# Patient Record
Sex: MALE | Race: BLACK OR AFRICAN AMERICAN | NOT HISPANIC OR LATINO | Employment: FULL TIME | URBAN - METROPOLITAN AREA
[De-identification: names, ages, dates, MRNs, and addresses within clinical notes are randomized per-mention and may not be internally consistent; named-entity substitution may affect disease eponyms.]

---

## 2019-04-10 ENCOUNTER — OFFICE VISIT (OUTPATIENT)
Dept: FAMILY MEDICINE | Facility: CLINIC | Age: 59
End: 2019-04-10
Payer: COMMERCIAL

## 2019-04-10 VITALS
HEART RATE: 68 BPM | DIASTOLIC BLOOD PRESSURE: 78 MMHG | TEMPERATURE: 97.8 F | OXYGEN SATURATION: 99 % | WEIGHT: 176 LBS | SYSTOLIC BLOOD PRESSURE: 120 MMHG | BODY MASS INDEX: 29.29 KG/M2

## 2019-04-10 DIAGNOSIS — Z00.00 ENCOUNTER FOR ROUTINE ADULT HEALTH EXAMINATION WITHOUT ABNORMAL FINDINGS: ICD-10-CM

## 2019-04-10 DIAGNOSIS — Z12.5 SCREENING FOR PROSTATE CANCER: ICD-10-CM

## 2019-04-10 DIAGNOSIS — Z11.4 ENCOUNTER FOR SCREENING FOR HIV: Primary | ICD-10-CM

## 2019-04-10 LAB
ANION GAP SERPL CALCULATED.3IONS-SCNC: 9 MMOL/L (ref 3–14)
BUN SERPL-MCNC: 13 MG/DL (ref 7–30)
CALCIUM SERPL-MCNC: 9.4 MG/DL (ref 8.5–10.1)
CHLORIDE SERPL-SCNC: 107 MMOL/L (ref 94–109)
CHOLEST SERPL-MCNC: 177 MG/DL
CO2 SERPL-SCNC: 23 MMOL/L (ref 20–32)
CREAT SERPL-MCNC: 0.94 MG/DL (ref 0.66–1.25)
GFR SERPL CREATININE-BSD FRML MDRD: 87 ML/MIN/{1.73_M2}
GLUCOSE SERPL-MCNC: 101 MG/DL (ref 70–99)
HDLC SERPL-MCNC: 46 MG/DL
LDLC SERPL CALC-MCNC: 107 MG/DL
NONHDLC SERPL-MCNC: 131 MG/DL
POTASSIUM SERPL-SCNC: 4.3 MMOL/L (ref 3.4–5.3)
SODIUM SERPL-SCNC: 139 MMOL/L (ref 133–144)
TRIGL SERPL-MCNC: 119 MG/DL

## 2019-04-10 PROCEDURE — 80048 BASIC METABOLIC PNL TOTAL CA: CPT | Performed by: INTERNAL MEDICINE

## 2019-04-10 PROCEDURE — 99396 PREV VISIT EST AGE 40-64: CPT | Performed by: INTERNAL MEDICINE

## 2019-04-10 PROCEDURE — 87389 HIV-1 AG W/HIV-1&-2 AB AG IA: CPT | Performed by: INTERNAL MEDICINE

## 2019-04-10 PROCEDURE — 36415 COLL VENOUS BLD VENIPUNCTURE: CPT | Performed by: INTERNAL MEDICINE

## 2019-04-10 PROCEDURE — 80061 LIPID PANEL: CPT | Performed by: INTERNAL MEDICINE

## 2019-04-10 RX ORDER — ASPIRIN 325 MG
325 TABLET, DELAYED RELEASE (ENTERIC COATED) ORAL EVERY 4 HOURS PRN
Qty: 100 TABLET | Refills: 0 | COMMUNITY
Start: 2019-04-10 | End: 2020-12-09

## 2019-04-10 ASSESSMENT — ENCOUNTER SYMPTOMS
ABDOMINAL PAIN: 0
HEADACHES: 0
CONSTIPATION: 0
COUGH: 0
MYALGIAS: 0
DIARRHEA: 0
NAUSEA: 0
DIZZINESS: 0
PALPITATIONS: 0
EYE PAIN: 0
DYSURIA: 0
FREQUENCY: 0
JOINT SWELLING: 0
PARESTHESIAS: 0
ARTHRALGIAS: 0
HEMATOCHEZIA: 0
SORE THROAT: 0
CHILLS: 0
WEAKNESS: 0
HEARTBURN: 0
FEVER: 0
SHORTNESS OF BREATH: 0
NERVOUS/ANXIOUS: 0
HEMATURIA: 0

## 2019-04-10 NOTE — LETTER
[facility]   [address] [place]  [address]  [phone]                                   April 17, 2019    Ingrid Villavicencio  [address]  [facility] [address]        Dear Ingrid,    Labs look good.  No change in medications needed    Results for orders placed or performed in visit on 04/10/19   HIV Antigen Antibody Combo   Result Value Ref Range    HIV Antigen Antibody Combo Nonreactive NR^Nonreactive       Lipid panel reflex to direct LDL Fasting   Result Value Ref Range    Cholesterol 177 <200 mg/dL    Triglycerides 119 <150 mg/dL    HDL Cholesterol 46 >39 mg/dL    LDL Cholesterol Calculated 107 (H) <100 mg/dL    Non HDL Cholesterol 131 (H) <130 mg/dL   Basic metabolic panel   Result Value Ref Range    Sodium 139 133 - 144 mmol/L    Potassium 4.3 3.4 - 5.3 mmol/L    Chloride 107 94 - 109 mmol/L    Carbon Dioxide 23 20 - 32 mmol/L    Anion Gap 9 3 - 14 mmol/L    Glucose 101 (H) 70 - 99 mg/dL    Urea Nitrogen 13 7 - 30 mg/dL    Creatinine 0.94 0.66 - 1.25 mg/dL    GFR Estimate 87 >60 mL/min/[1.73_m2]    GFR Estimate If Black >90 >60 mL/min/[1.73_m2]    Calcium 9.4 8.5 - 10.1 mg/dL       If you have any questions please call the clinic at [phone]    Sincerely,    Amando Hurtado MD  [facility]

## 2019-04-10 NOTE — PROGRESS NOTES
SUBJECTIVE:   CC: Ingrid Villavicencio is an 58 year old male who presents for preventative health visit.     Healthy Habits:     Getting at least 3 servings of Calcium per day:  Yes    Bi-annual eye exam:  Yes    Dental care twice a year:  NO    Sleep apnea or symptoms of sleep apnea:  None    Diet:  Regular (no restrictions)    Frequency of exercise:  2-3 days/week    Duration of exercise:  15-30 minutes    Taking medications regularly:  Not Applicable    Medication side effects:  Not applicable    PHQ-2 Total Score: 0    Additional concerns today:  No    Blood pressure good  1 year old at home  8 and 1 years.  ruth ann          Today's PHQ-2 Score:   PHQ-2 ( 1999 [employer]) 4/10/2019   Q1: Little interest or pleasure in doing things 0   Q2: Feeling down, depressed or hopeless 0   PHQ-2 Score 0   Q1: Little interest or pleasure in doing things Not at all   Q2: Feeling down, depressed or hopeless Not at all   PHQ-2 Score 0       Abuse: Current or Past(Physical, Sexual or Emotional)- No  Do you feel safe in your environment? Yes    Social History     Tobacco Use     Smoking status: Never Smoker     Smokeless tobacco: Never Used   Substance Use Topics     Alcohol use: No     If you drink alcohol do you typically have >3 drinks per day or >7 drinks per week? No    Alcohol Use 4/10/2019   Prescreen: >3 drinks/day or >7 drinks/week? Not Applicable   Prescreen: >3 drinks/day or >7 drinks/week? -   No flowsheet data found.    Last PSA: No results found for: PSA    Reviewed orders with patient. Reviewed health maintenance and updated orders accordingly - Yes  Labs reviewed in EPIC  BP Readings from Last 3 Encounters:   04/10/19 120/78   08/02/16 122/77   08/03/15 122/76    Wt Readings from Last 3 Encounters:   04/10/19 79.8 kg (176 lb)   08/03/15 79.8 kg (176 lb)   11/05/14 79.4 kg (175 lb)                  Patient Active Problem List   Diagnosis     CARDIOVASCULAR SCREENING; LDL GOAL LESS THAN 160     Erectile dysfunction      GERD (gastroesophageal reflux disease)     Advanced directives, counseling/discussion     Bilateral low back pain with right-sided sciatica     History reviewed. No pertinent surgical history.    Social History     Tobacco Use     Smoking status: Never Smoker     Smokeless tobacco: Never Used   Substance Use Topics     Alcohol use: No     History reviewed. No pertinent family history.      Current Outpatient Medications   Medication Sig Dispense Refill     aspirin (ASA) 325 MG EC tablet Take 1 tablet (325 mg) by mouth every 4 hours as needed for moderate pain 100 tablet 0     Allergies   Allergen Reactions     Amoxicillin      Upset stomach/diarrhea       Reviewed and updated as needed this visit by clinical staff  Tobacco  Allergies  Meds  Med Hx  Surg Hx  Fam Hx  Soc Hx        Reviewed and updated as needed this visit by Provider            Review of Systems   Constitutional: Negative for chills and fever.   HENT: Negative for congestion, ear pain, hearing loss and sore throat.    Eyes: Negative for pain and visual disturbance.   Respiratory: Negative for cough and shortness of breath.    Cardiovascular: Negative for chest pain, palpitations and peripheral edema.   Gastrointestinal: Negative for abdominal pain, constipation, diarrhea, heartburn, hematochezia and nausea.   Genitourinary: Positive for urgency. Negative for discharge, dysuria, frequency, genital sores, hematuria and impotence.   Musculoskeletal: Negative for arthralgias, joint swelling and myalgias.   Skin: Negative for rash.   Neurological: Negative for dizziness, weakness, headaches and paresthesias.   Psychiatric/Behavioral: Negative for mood changes. The patient is not nervous/anxious.      CONSTITUTIONAL: NEGATIVE for fever, chills, change in weight  INTEGUMENTARY/SKIN: NEGATIVE for worrisome rashes, moles or lesions  EYES: NEGATIVE for vision changes or irritation  ENT: NEGATIVE for ear, mouth and throat problems  RESP: NEGATIVE for  significant cough or SOB  CV: NEGATIVE for chest pain, palpitations or peripheral edema  GI: NEGATIVE for nausea, abdominal pain, heartburn, or change in bowel habits   male: negative for dysuria, hematuria, decreased urinary stream, erectile dysfunction, urethral discharge  MUSCULOSKELETAL: NEGATIVE for significant arthralgias or myalgia  NEURO: NEGATIVE for weakness, dizziness or paresthesias  PSYCHIATRIC: NEGATIVE for changes in mood or affect    OBJECTIVE:   /78 (BP Location: Right arm, Patient Position: Chair, Cuff Size: Adult Regular)   Pulse 68   Temp 97.8  F (36.6  C) (Oral)   Wt 79.8 kg (176 lb)   SpO2 99%   BMI 29.29 kg/m      Physical Exam  GENERAL: healthy, alert and no distress  NECK: no adenopathy, no asymmetry, masses, or scars and thyroid normal to palpation  RESP: lungs clear to auscultation - no rales, rhonchi or wheezes  CV: regular rate and rhythm, normal S1 S2, no S3 or S4, no murmur, click or rub, no peripheral edema and peripheral pulses strong  ABDOMEN: soft, nontender, no hepatosplenomegaly, no masses and bowel sounds normal  MS: no gross musculoskeletal defects noted, no edema    Diagnostic Test Results:  Results for orders placed or performed in visit on 04/10/19   HIV Antigen Antibody Combo   Result Value Ref Range    HIV Antigen Antibody Combo Nonreactive NR^Nonreactive       Lipid panel reflex to direct LDL Fasting   Result Value Ref Range    Cholesterol 177 <200 mg/dL    Triglycerides 119 <150 mg/dL    HDL Cholesterol 46 >39 mg/dL    LDL Cholesterol Calculated 107 (H) <100 mg/dL    Non HDL Cholesterol 131 (H) <130 mg/dL   Basic metabolic panel   Result Value Ref Range    Sodium 139 133 - 144 mmol/L    Potassium 4.3 3.4 - 5.3 mmol/L    Chloride 107 94 - 109 mmol/L    Carbon Dioxide 23 20 - 32 mmol/L    Anion Gap 9 3 - 14 mmol/L    Glucose 101 (H) 70 - 99 mg/dL    Urea Nitrogen 13 7 - 30 mg/dL    Creatinine 0.94 0.66 - 1.25 mg/dL    GFR Estimate 87 >60 mL/min/[1.73_m2]     "GFR Estimate If Black >90 >60 mL/min/[1.73_m2]    Calcium 9.4 8.5 - 10.1 mg/dL       ASSESSMENT/PLAN:       ICD-10-CM    1. Encounter for screening for HIV Z11.4 HIV Antigen Antibody Combo   2. Encounter for routine adult health examination without abnormal findings Z00.00 VACCINE ADMINISTRATION, INITIAL     Lipid panel reflex to direct LDL Fasting     Basic metabolic panel   3. Screening for prostate cancer Z12.5 CANCELED: PSA, screen       COUNSELING:   Reviewed preventive health counseling, as reflected in patient instructions       Consider AAA screening for ages 65-75 and smoking history       Regular exercise       Healthy diet/nutrition       Vision screening       Hearing screening       Colon cancer screening       Prostate cancer screening    BP Readings from Last 1 Encounters:   04/10/19 120/78     Estimated body mass index is 29.29 kg/m  as calculated from the following:    Height as of 8/3/15: 1.651 m (5' 5\").    Weight as of this encounter: 79.8 kg (176 lb).      Weight management plan: Discussed healthy diet and exercise guidelines     reports that he has never smoked. He has never used smokeless tobacco.      ICD-10-CM    1. Encounter for screening for HIV Z11.4 HIV Antigen Antibody Combo   2. Encounter for routine adult health examination without abnormal findings Z00.00 VACCINE ADMINISTRATION, INITIAL     Lipid panel reflex to direct LDL Fasting     Basic metabolic panel   3. Screening for prostate cancer Z12.5 CANCELED: PSA, screen     Counseling Resources:  ATP IV Guidelines  Pooled Cohorts Equation Calculator  FRAX Risk Assessment  ICSI Preventive Guidelines  Dietary Guidelines for Americans, 2010  USDA's MyPlate  ASA Prophylaxis  Lung CA Screening    Amando Hurtado MD  [facility]  "

## 2019-04-11 LAB — HIV 1+2 AB+HIV1 P24 AG SERPL QL IA: NONREACTIVE

## 2019-05-24 ENCOUNTER — TELEPHONE (OUTPATIENT)
Dept: FAMILY MEDICINE | Facility: CLINIC | Age: 59
End: 2019-05-24

## 2019-05-24 NOTE — TELEPHONE ENCOUNTER
Notes recorded by Amando Hurtado MD on 4/17/2019 at 1:04 PM CDT  Labs look good.  No change in medications needed.    Notified patient of the above results.    Patient stated understanding and agreeable with the plan of care. TRE NegreteN,RN, [facility] Triage.

## 2019-05-24 NOTE — TELEPHONE ENCOUNTER
Reason for Call:  Request for results:    Name of test or procedure: Lab results    Date of test of procedure: 5/23/19 per PT but physical was on 4/10/19    Location of the test or procedure: [facility] per PT    OK to leave the result message on voice mail or with a family member? YES    Phone number Patient can be reached at:  Cell number on file:    Telephone Information:   Mobile [phone]       Additional comments: Did not see the appt and PT signing up for MY Chart to see as well.    Call taken on 5/24/2019 at 3:17 PM by Yaneth Yadav

## 2019-10-22 ENCOUNTER — TELEPHONE (OUTPATIENT)
Dept: FAMILY MEDICINE | Facility: CLINIC | Age: 59
End: 2019-10-22

## 2019-10-22 NOTE — TELEPHONE ENCOUNTER
Panel Management Review      Patient has the following on his problem list: None      Composite cancer screening  Chart review shows that this patient is due/due soon for the following Colonoscopy  Summary:    Patient is due/failing the following:   COLONOSCOPY    Action needed:   Patient needs referral/order: Colonoscopy    Type of outreach:    Sent letter.    Questions for provider review:    Order Colonoscopy                                                                                                                                    Ingrid Paulino MA       Chart routed to Care Team .

## 2019-10-22 NOTE — LETTER
October 22, 2019    Ingrid Villavicencio  [address] [facility] [address]    Dear Ingrid,    We care about your health and have reviewed your health plan. We have reviewed your medical conditions, medication list, and lab results and are making recommendations based on this review, to better manage your health.    You are in particular need of attention regarding:  - Scheduling a Colon Cancer Screening (Colonoscopy only) [phone] for [facility], call clinic for referral elsewhere    Here is a list of Health Maintenance topics that are due now or due soon:  Health Maintenance Due   Topic Date Due     DTAP/TDAP/TD IMMUNIZATION (1 - Tdap) 08/07/1983     ZOSTER IMMUNIZATION (1 of 2) 08/07/2008     COLONOSCOPY  03/07/2018     ADVANCE CARE PLANNING  05/14/2019     INFLUENZA VACCINE (1) 09/01/2019       Please call us at [phone] (or use [employer]) to address the above recommendations.     Thank you for trusting [facility] with your healthcare needs. We appreciate the opportunity to serve you and look forward to supporting you in the future.    Healthy Regards,    Your Care Team  JPJUAN/MARK  (Team 3)

## 2019-10-22 NOTE — LETTER
November 5, 2019    Ingrid Villavicencio  [address] [address]      Dear Ingrid,    We have tried to contact you about your health, but have been unable to reach you.  Please call us as soon as possible so we can provide you with the best care possible.  We will continue to check in with you throughout the year to complete these items of care, if you are not able to complete these items at this time.  If you would like to complete the missing items for your care, please contact us at [phone].    We recommend the following:  - Schedule a Colonoscopy (due every 10 years or every 5 years in higher risk situations). Call Kasey Mckeon at [phone] or call the clinic for referral elsewhere.  The other option is a FIT test (take home stool sample kit).  It does not replace the colonoscopy for colorectal cancer screening, but it can detect hidden bleeding in the lower colon.  It does need to be repeated every year and if a positive result is obtained, you would be referred for a colonoscopy. If you have completed either one of these tests at another facility, please call with the details of when and where the tests were done and if they were normal or not. Or have the records sent to our clinic so that we can best coordinate your care.      Sincerely,     Your Care Team   QING/EN

## 2019-11-18 ENCOUNTER — OFFICE VISIT (OUTPATIENT)
Dept: FAMILY MEDICINE | Facility: CLINIC | Age: 59
End: 2019-11-18
Payer: COMMERCIAL

## 2019-11-18 VITALS
DIASTOLIC BLOOD PRESSURE: 82 MMHG | TEMPERATURE: 97.9 F | WEIGHT: 175 LBS | OXYGEN SATURATION: 100 % | HEART RATE: 81 BPM | BODY MASS INDEX: 29.12 KG/M2 | SYSTOLIC BLOOD PRESSURE: 138 MMHG

## 2019-11-18 DIAGNOSIS — Z12.5 SPECIAL SCREENING FOR MALIGNANT NEOPLASM OF PROSTATE: ICD-10-CM

## 2019-11-18 DIAGNOSIS — Z12.11 SPECIAL SCREENING FOR MALIGNANT NEOPLASMS, COLON: Primary | ICD-10-CM

## 2019-11-18 PROCEDURE — 99214 OFFICE O/P EST MOD 30 MIN: CPT | Performed by: INTERNAL MEDICINE

## 2019-11-18 NOTE — PROGRESS NOTES
Subjective     Ingrid Villavicencio is a 59 year old male who presents to clinic today for the following health issues:    HPI   Left leg tingling then switches to rigth leg.  Pt is unclear about lab results from back in April so he would like clarification.        Patient Active Problem List   Diagnosis     CARDIOVASCULAR SCREENING; LDL GOAL LESS THAN 160     Erectile dysfunction     GERD (gastroesophageal reflux disease)     Advanced directives, counseling/discussion     Bilateral low back pain with right-sided sciatica     History reviewed. No pertinent surgical history.    Social History     Tobacco Use     Smoking status: Never Smoker     Smokeless tobacco: Never Used   Substance Use Topics     Alcohol use: No     History reviewed. No pertinent family history.      Current Outpatient Medications   Medication Sig Dispense Refill     UNABLE TO FIND MEDICATION NAME: OTC supplements       aspirin (ASA) 325 MG EC tablet Take 1 tablet (325 mg) by mouth every 4 hours as needed for moderate pain 100 tablet 0     Allergies   Allergen Reactions     Amoxicillin      Upset stomach/diarrhea     Recent Labs   Lab Test 04/10/19  1108 11/25/14  0838   * 99   HDL 46 42   TRIG 119 87   ALT  --  34   CR 0.94 0.90   GFRESTIMATED 87 87   GFRESTBLACK >90 >90  African American GFR Calc     POTASSIUM 4.3 4.2      BP Readings from Last 3 Encounters:   11/18/19 138/82   04/10/19 120/78   08/02/16 122/77    Wt Readings from Last 3 Encounters:   11/18/19 79.4 kg (175 lb)   04/10/19 79.8 kg (176 lb)   08/03/15 79.8 kg (176 lb)                      Reviewed and updated as needed this visit by Provider         Review of Systems   ROS COMP: Constitutional, HEENT, cardiovascular, pulmonary, gi and gu systems are negative, except as otherwise noted.      Objective    /82 (BP Location: Right arm, Patient Position: Chair, Cuff Size: Adult Regular)   Pulse 81   Temp 97.9  F (36.6  C) (Oral)   Wt 79.4 kg (175 lb)   SpO2 100%   BMI 29.12  kg/m    Body mass index is 29.12 kg/m .  Physical Exam   GENERAL: healthy, alert and no distress  NECK: no adenopathy, no asymmetry, masses, or scars and thyroid normal to palpation  RESP: lungs clear to auscultation - no rales, rhonchi or wheezes  CV: regular rate and rhythm, normal S1 S2, no S3 or S4, no murmur, click or rub, no peripheral edema and peripheral pulses strong  ABDOMEN: soft, nontender, no hepatosplenomegaly, no masses and bowel sounds normal  MS: no gross musculoskeletal defects noted, no edema    Diagnostic Test Results:  Labs reviewed in Epic  No results found for any visits on 11/18/19.        Assessment & Plan       ICD-10-CM    1. Special screening for malignant neoplasms, colon Z12.11 GASTROENTEROLOGY ADULT REF PROCEDURE ONLY [facility] [phone]   2. Special screening for malignant neoplasm of prostate Z12.5 **Prostate spec antigen screen FUTURE 1yr          ICD-10-CM    1. Special screening for malignant neoplasms, colon Z12.11 GASTROENTEROLOGY ADULT REF PROCEDURE ONLY [facility] [phone]   2. Special screening for malignant neoplasm of prostate Z12.5 **Prostate spec antigen screen FUTURE 1yr         Regular exercise    Return in about 5 months (around 4/18/2020) for follow up of condition, medication management.    Amando Hurtado MD  [facility]

## 2020-07-14 ENCOUNTER — TELEPHONE (OUTPATIENT)
Dept: FAMILY MEDICINE | Facility: CLINIC | Age: 60
End: 2020-07-14

## 2020-07-14 DIAGNOSIS — K21.00 GASTROESOPHAGEAL REFLUX DISEASE WITH ESOPHAGITIS: Primary | ICD-10-CM

## 2020-07-14 NOTE — TELEPHONE ENCOUNTER
Attempt # 1  Called  [phone]    Did patient answer the phone: No, left a message on voicemail to return call to triage line at [phone].    Patient has an appointment scheduled with Dr. Krueger for tomorrow at 0920.    Estefania RossRN,BSN  [facility]

## 2020-07-14 NOTE — TELEPHONE ENCOUNTER
Reason for call:  Patient reporting a symptom    Symptom or request: Heart burn    Duration (how long have symptoms been present): 3 nights    Have you been treated for this before? No    Additional comments: Patient called and stated he has been very stressed out because of family issues and for the past three days he has had a lot of stomach issues.  Patient is requesting to speak to his PCP Nurse to discuss symptoms of heart burn, bloating, burning.    Phone Number patient can be reached at:  Work number on file:  [phone] (work)    Best Time:  ASAP    Can we leave a detailed message on this number:  YES    Call taken on 7/14/2020 at 9:43 AM by Danni Peralta

## 2020-07-16 NOTE — TELEPHONE ENCOUNTER
Patient returned call to RN line and left message for call back to him, he actually left a rather lengthy voicemail, states he had phone visit scheduled with Dr. Krueger and did not find that very helpful (appears to me this was not done), wants to talk to or see Dr. Hurtado (his PCP), reports is having stomach burning, gas, burping, bloating.    I see there is another phone call already routed to provider regarding colo lyle test.    Flagged for call back, ?pharmacy? If Rx advised by Dr. Hurtado.    Cass Duke RN  [employer] [facility]

## 2020-07-16 NOTE — TELEPHONE ENCOUNTER
Attempt # 1  Called patient at home [phone]  Was call answered?  Yes, patient states somebody called him and she was not very nice.   Does not want to speak to Dr. Krueger.    Sunday night went to bed stomach pain upper stomach all night, felt bloated, stomach growling. Top side of stomach has hot flash. Intermittent pain is very stressed with family issues right now. Describes the pain at bottom of the breast bone, stomach gurgles.   Regular BM.    Nurse explained stress builds stomach acids, advised to avoid caffeine and spicy foods, drink plenty of water, take Tums to neutralize the stomach acid. Patient requested to see PCP as soon as possible and nurse did schedule a physical for August 3 and office visit Monday 7/20          Do you have: Negative screening.     Fever >100.0 -NO  New cough-NO  Shortness of breath-NO  Chills-NO  New loss of taste or smell-NO  Generalized body aches-NO  New persistent headache-NO  New sore throat-NO  New rash-NO  Nausea, vomiting or diarrhea-NO     Within the past 3 weeks, have you been exposed to someone with a known positive COVID 19 test?-NO  Have you traveled anywhere?-NO          Routing to PCP ILIANA Smalls RN  [facility]

## 2020-07-17 RX ORDER — PANTOPRAZOLE SODIUM 20 MG/1
20 TABLET, DELAYED RELEASE ORAL DAILY
Qty: 31 TABLET | Refills: 1 | Status: SHIPPED | OUTPATIENT
Start: 2020-07-17 | End: 2020-12-09

## 2020-07-17 NOTE — TELEPHONE ENCOUNTER
Called and left message  Ok to start protonix for 1-2 weeks.  If not improved, will need to be seen for further evaluation

## 2020-07-17 NOTE — TELEPHONE ENCOUNTER
Patient would like a callback to discuss his appointment for Monday and if he should keep that appointment.     [phone]    Thank you,  Dayana Hernandes  Patient Rep.  [facility]

## 2020-07-17 NOTE — TELEPHONE ENCOUNTER
RN spoke with patient. Relayed providers message below.     Melissa Conway RN, BSN, PHN  [facility] [place]

## 2020-08-03 ENCOUNTER — OFFICE VISIT (OUTPATIENT)
Dept: FAMILY MEDICINE | Facility: CLINIC | Age: 60
End: 2020-08-03
Payer: COMMERCIAL

## 2020-08-03 VITALS
SYSTOLIC BLOOD PRESSURE: 124 MMHG | TEMPERATURE: 98.5 F | OXYGEN SATURATION: 100 % | BODY MASS INDEX: 28.96 KG/M2 | DIASTOLIC BLOOD PRESSURE: 78 MMHG | HEART RATE: 65 BPM | WEIGHT: 174 LBS

## 2020-08-03 DIAGNOSIS — Z12.5 SPECIAL SCREENING FOR MALIGNANT NEOPLASM OF PROSTATE: ICD-10-CM

## 2020-08-03 DIAGNOSIS — Z00.00 ROUTINE GENERAL MEDICAL EXAMINATION AT A HEALTH CARE FACILITY: Primary | ICD-10-CM

## 2020-08-03 PROCEDURE — 99396 PREV VISIT EST AGE 40-64: CPT | Performed by: INTERNAL MEDICINE

## 2020-08-03 NOTE — PROGRESS NOTES
SUBJECTIVE:   CC: Ingrid Villavicencio is an 59 year old male who presents for preventative health visit.     Healthy Habits:    Getting at least 3 servings of Calcium per day:  Yes    Bi-annual eye exam:  Yes    Dental care twice a year:  NO    Sleep apnea or symptoms of sleep apnea:  None    Diet:  Regular (no restrictions)    Frequency of exercise:  4-5 days/week    Duration of exercise:  30-45 minutes    Taking medications regularly:  Yes    Barriers to taking medications:  None    Medication side effects:  None    PHQ-2 Total Score:    Additional concerns today:  No      Had stomach upset and took the tums and the the protonix  Hiatal hernia     ]    Today's PHQ-2 Score:   PHQ-2 ( 1999 Pfizer) 4/10/2019   Q1: Little interest or pleasure in doing things 0   Q2: Feeling down, depressed or hopeless 0   PHQ-2 Score 0   Q1: Little interest or pleasure in doing things Not at all   Q2: Feeling down, depressed or hopeless Not at all   PHQ-2 Score 0     ruth ann Peña 2012   In the eye   [facility]    Abuse: Current or Past(Physical, Sexual or Emotional)- No  Do you feel safe in your environment? Yes    Have you ever done Advance Care Planning? (For example, a Health Directive, POLST, or a discussion with a medical provider or your loved ones about your wishes): No, advance care planning information given to patient to review.  Patient plans to discuss their wishes with loved ones or provider.      Social History     Tobacco Use     Smoking status: Never Smoker     Smokeless tobacco: Never Used   Substance Use Topics     Alcohol use: No     If you drink alcohol do you typically have >3 drinks per day or >7 drinks per week? No    Alcohol Use 4/10/2019   Prescreen: >3 drinks/day or >7 drinks/week? Not Applicable   Prescreen: >3 drinks/day or >7 drinks/week? -   No flowsheet data found.    Last PSA: No results found for: PSA    Reviewed orders with patient. Reviewed health maintenance and updated orders  accordingly - Yes  Lab work is in process  Labs reviewed in EPIC  BP Readings from Last 3 Encounters:   08/03/20 124/78   11/18/19 138/82   04/10/19 120/78    Wt Readings from Last 3 Encounters:   08/03/20 78.9 kg (174 lb)   11/18/19 79.4 kg (175 lb)   04/10/19 79.8 kg (176 lb)                  Patient Active Problem List   Diagnosis     CARDIOVASCULAR SCREENING; LDL GOAL LESS THAN 160     Erectile dysfunction     GERD (gastroesophageal reflux disease)     Advanced directives, counseling/discussion     Bilateral low back pain with right-sided sciatica     History reviewed. No pertinent surgical history.    Social History     Tobacco Use     Smoking status: Never Smoker     Smokeless tobacco: Never Used   Substance Use Topics     Alcohol use: No     History reviewed. No pertinent family history.      Current Outpatient Medications   Medication Sig Dispense Refill     aspirin (ASA) 325 MG EC tablet Take 1 tablet (325 mg) by mouth every 4 hours as needed for moderate pain 100 tablet 0     pantoprazole (PROTONIX) 20 MG EC tablet Take 1 tablet (20 mg) by mouth daily 31 tablet 1     UNABLE TO FIND MEDICATION NAME: OTC supplements       Allergies   Allergen Reactions     Amoxicillin      Upset stomach/diarrhea       Reviewed and updated as needed this visit by clinical staff  Tobacco  Allergies  Meds  Med Hx  Surg Hx  Fam Hx  Soc Hx        Reviewed and updated as needed this visit by Provider            Review of Systems  CONSTITUTIONAL: NEGATIVE for fever, chills, change in weight  INTEGUMENTARY/SKIN: NEGATIVE for worrisome rashes, moles or lesions  EYES: NEGATIVE for vision changes or irritation  ENT: NEGATIVE for ear, mouth and throat problems  RESP: NEGATIVE for significant cough or SOB  CV: NEGATIVE for chest pain, palpitations or peripheral edema  GI: NEGATIVE for nausea, abdominal pain, heartburn, or change in bowel habits   male: negative for dysuria, hematuria, decreased urinary stream, erectile  "dysfunction, urethral discharge  MUSCULOSKELETAL: NEGATIVE for significant arthralgias or myalgia  NEURO: NEGATIVE for weakness, dizziness or paresthesias  PSYCHIATRIC: NEGATIVE for changes in mood or affect    OBJECTIVE:   There were no vitals taken for this visit.    Physical Exam  GENERAL: healthy, alert and no distress  NECK: no adenopathy, no asymmetry, masses, or scars and thyroid normal to palpation  RESP: lungs clear to auscultation - no rales, rhonchi or wheezes  CV: regular rate and rhythm, normal S1 S2, no S3 or S4, no murmur, click or rub, no peripheral edema and peripheral pulses strong  ABDOMEN: soft, nontender, no hepatosplenomegaly, no masses and bowel sounds normal  MS: no gross musculoskeletal defects noted, no edema    Diagnostic Test Results:  Labs reviewed in Epic  No results found for any visits on 08/03/20.    ASSESSMENT/PLAN:       ICD-10-CM    1. Routine general medical examination at a health care facility  Z00.00 **Basic metabolic panel FUTURE anytime     Lipid panel reflex to direct LDL Fasting       COUNSELING:   Reviewed preventive health counseling, as reflected in patient instructions       Consider AAA screening for ages 65-75 and smoking history       Regular exercise       Healthy diet/nutrition       Vision screening       Hearing screening       Colon cancer screening    Estimated body mass index is 29.12 kg/m  as calculated from the following:    Height as of 8/3/15: 1.651 m (5' 5\").    Weight as of 11/18/19: 79.4 kg (175 lb).     Weight management plan: Patient referred to endocrine and/or weight management specialty     reports that he has never smoked. He has never used smokeless tobacco.      ICD-10-CM    1. Routine general medical examination at a health care facility  Z00.00 **Basic metabolic panel FUTURE anytime     Lipid panel reflex to direct LDL Fasting     **TSH with free T4 reflex FUTURE anytime   2. Special screening for malignant neoplasm of prostate  Z12.5 PSA, " screen       Counseling Resources:  ATP IV Guidelines  Pooled Cohorts Equation Calculator  FRAX Risk Assessment  ICSI Preventive Guidelines  Dietary Guidelines for Americans, 2010  USDA's MyPlate  ASA Prophylaxis  Lung CA Screening      cologuard ordering.      Amando Hurtado MD  [facility]

## 2020-08-14 ENCOUNTER — TELEPHONE (OUTPATIENT)
Dept: FAMILY MEDICINE | Facility: CLINIC | Age: 60
End: 2020-08-14

## 2020-08-14 ENCOUNTER — DOCUMENTATION ONLY (OUTPATIENT)
Dept: FAMILY MEDICINE | Facility: CLINIC | Age: 60
End: 2020-08-14

## 2020-08-14 DIAGNOSIS — Z13.6 CARDIOVASCULAR SCREENING; LDL GOAL LESS THAN 160: Primary | ICD-10-CM

## 2020-08-14 NOTE — TELEPHONE ENCOUNTER
Reason for Call: Request for an order or referral:    Order or referral being requested: Blood type     Date needed: before my next appointment    Has the patient been seen by the PCP for this problem? Not Applicable    Additional comments: Patient is wanting order for blood type before his lab appointment on 8/17/20.     Phone number Patient can be reached at:  Home number on file [phone] (home)    Best Time:  Anytime    Can we leave a detailed message on this number?  YES    Call taken on 8/14/2020 at 3:03 PM by Andreas Quintero

## 2020-08-14 NOTE — PROGRESS NOTES
I will call next week to determine what he wants it for as it is usually a test we do if there are blood reactions, need for transfusion or incaompatibilities    Just checking ( like to see about covid risk) is not really helpful      
Patient requested a Blood Typing lab. Please order labs as needed.     Nuria Thank you, lab.   
Psych/Behavioral

## 2020-08-17 DIAGNOSIS — Z00.00 ROUTINE GENERAL MEDICAL EXAMINATION AT A HEALTH CARE FACILITY: ICD-10-CM

## 2020-08-17 DIAGNOSIS — Z12.5 SPECIAL SCREENING FOR MALIGNANT NEOPLASM OF PROSTATE: ICD-10-CM

## 2020-08-17 DIAGNOSIS — R97.20 ELEVATED PROSTATE SPECIFIC ANTIGEN (PSA): Primary | ICD-10-CM

## 2020-08-17 LAB
ANION GAP SERPL CALCULATED.3IONS-SCNC: 10 MMOL/L (ref 3–14)
BUN SERPL-MCNC: 11 MG/DL (ref 7–30)
CALCIUM SERPL-MCNC: 9.5 MG/DL (ref 8.5–10.1)
CHLORIDE SERPL-SCNC: 109 MMOL/L (ref 94–109)
CHOLEST SERPL-MCNC: 172 MG/DL
CO2 SERPL-SCNC: 23 MMOL/L (ref 20–32)
CREAT SERPL-MCNC: 0.9 MG/DL (ref 0.66–1.25)
GFR SERPL CREATININE-BSD FRML MDRD: >90 ML/MIN/{1.73_M2}
GLUCOSE SERPL-MCNC: 92 MG/DL (ref 70–99)
HDLC SERPL-MCNC: 44 MG/DL
LDLC SERPL CALC-MCNC: 111 MG/DL
NONHDLC SERPL-MCNC: 128 MG/DL
POTASSIUM SERPL-SCNC: 3.9 MMOL/L (ref 3.4–5.3)
PSA SERPL-ACNC: 22.8 UG/L (ref 0–4)
SODIUM SERPL-SCNC: 139 MMOL/L (ref 133–144)
TRIGL SERPL-MCNC: 84 MG/DL
TSH SERPL DL<=0.005 MIU/L-ACNC: 2.26 MU/L (ref 0.4–4)

## 2020-08-17 PROCEDURE — 80048 BASIC METABOLIC PNL TOTAL CA: CPT | Performed by: INTERNAL MEDICINE

## 2020-08-17 PROCEDURE — 36415 COLL VENOUS BLD VENIPUNCTURE: CPT | Performed by: INTERNAL MEDICINE

## 2020-08-17 PROCEDURE — 84443 ASSAY THYROID STIM HORMONE: CPT | Performed by: INTERNAL MEDICINE

## 2020-08-17 PROCEDURE — G0103 PSA SCREENING: HCPCS | Performed by: INTERNAL MEDICINE

## 2020-08-17 PROCEDURE — 80061 LIPID PANEL: CPT | Performed by: INTERNAL MEDICINE

## 2020-08-17 NOTE — LETTER
[facility]   [address] [place]  [address]  [phone]                                   August 24, 2020    Ingrid Villavicencio  [address] [address]        Dear Ingrid,    Your PSA level is elevated and should be evaluated by the urologist.   Please call to help him set this up     Results for orders placed or performed in visit on 08/17/20   PSA, screen     Status: Abnormal   Result Value Ref Range    PSA 22.80 (H) 0 - 4 ug/L   **TSH with free T4 reflex FUTURE anytime     Status: None   Result Value Ref Range    TSH 2.26 0.40 - 4.00 mU/L   Lipid panel reflex to direct LDL Fasting     Status: Abnormal   Result Value Ref Range    Cholesterol 172 <200 mg/dL    Triglycerides 84 <150 mg/dL    HDL Cholesterol 44 >39 mg/dL    LDL Cholesterol Calculated 111 (H) <100 mg/dL    Non HDL Cholesterol 128 <130 mg/dL   **Basic metabolic panel FUTURE anytime     Status: None   Result Value Ref Range    Sodium 139 133 - 144 mmol/L    Potassium 3.9 3.4 - 5.3 mmol/L    Chloride 109 94 - 109 mmol/L    Carbon Dioxide 23 20 - 32 mmol/L    Anion Gap 10 3 - 14 mmol/L    Glucose 92 70 - 99 mg/dL    Urea Nitrogen 11 7 - 30 mg/dL    Creatinine 0.90 0.66 - 1.25 mg/dL    GFR Estimate >90 >60 mL/min/[1.73_m2]    GFR Estimate If Black >90 >60 mL/min/[1.73_m2]    Calcium 9.5 8.5 - 10.1 mg/dL   If you have any questions please call the clinic at [phone]    Sincerely,    Amando Hurtado MD  bmd

## 2020-08-28 ENCOUNTER — TELEPHONE (OUTPATIENT)
Dept: FAMILY MEDICINE | Facility: CLINIC | Age: 60
End: 2020-08-28

## 2020-08-28 NOTE — TELEPHONE ENCOUNTER
Reason for Call:  Other     Detailed comments: Patient said that he was to be referred for a cologuard and also he was to have blood type the last time he had his blood work completed.    Phone Number Patient can be reached at: Cell number on file:    Telephone Information:   Mobile [phone]       Best Time:     Can we leave a detailed message on this number? YES    Call taken on 8/28/2020 at 9:49 AM by Lisa Kumar

## 2020-08-28 NOTE — TELEPHONE ENCOUNTER
Reason for Call:  Other     Detailed comments: Patient would like to be seen for a face to face for PSA.    Phone Number Patient can be reached at: Cell number on file:    Telephone Information:   Mobile [phone]       Best Time:     Can we leave a detailed message on this number? YES    Call taken on 8/28/2020 at 9:46 AM by Lisa Kumar

## 2020-08-28 NOTE — TELEPHONE ENCOUNTER
Called lab spoke to Tanner who states Jovani will call nurse back.    Nurse sees Colonoscopy referral entered 11/18/2019 [facility]  [phone]    Was drawn at [facility] - nurse called [facility] at [phone] no answer.      Attempt # 1  Called patient at home number.  Was call answered?  Yes, patient requesting a cologuard test?  Address is correct.      Routing to PCP to enter colorguard test.          Ana Smalls RN  Triage Nurse  [facility]  Appointment line: [phone]  [facility], 24 hour nurse line, available by calling clinic at [phone] and following prompts.

## 2020-09-17 ENCOUNTER — MEDICAL CORRESPONDENCE (OUTPATIENT)
Dept: HEALTH INFORMATION MANAGEMENT | Facility: CLINIC | Age: 60
End: 2020-09-17

## 2020-09-17 NOTE — TELEPHONE ENCOUNTER
Requested information faxed to number provided.  [facility]  Team 3 Coordinator

## 2020-09-25 ENCOUNTER — OFFICE VISIT (OUTPATIENT)
Dept: UROLOGY | Facility: CLINIC | Age: 60
End: 2020-09-25
Attending: INTERNAL MEDICINE
Payer: COMMERCIAL

## 2020-09-25 VITALS
DIASTOLIC BLOOD PRESSURE: 80 MMHG | HEART RATE: 73 BPM | SYSTOLIC BLOOD PRESSURE: 120 MMHG | WEIGHT: 174 LBS | BODY MASS INDEX: 27.31 KG/M2 | OXYGEN SATURATION: 98 % | HEIGHT: 67 IN

## 2020-09-25 DIAGNOSIS — R97.20 ELEVATED PROSTATE SPECIFIC ANTIGEN (PSA): Primary | ICD-10-CM

## 2020-09-25 DIAGNOSIS — R68.89 ABNORMAL DIGITAL RECTAL EXAM: ICD-10-CM

## 2020-09-25 DIAGNOSIS — N40.2 PROSTATE NODULE: ICD-10-CM

## 2020-09-25 LAB — PSA SERPL-MCNC: 30.5 NG/ML (ref 0–4)

## 2020-09-25 PROCEDURE — 84153 ASSAY OF PSA TOTAL: CPT | Performed by: UROLOGY

## 2020-09-25 PROCEDURE — 36415 COLL VENOUS BLD VENIPUNCTURE: CPT | Performed by: UROLOGY

## 2020-09-25 PROCEDURE — 99244 OFF/OP CNSLTJ NEW/EST MOD 40: CPT | Performed by: UROLOGY

## 2020-09-25 RX ORDER — CIPROFLOXACIN 500 MG/1
500 TABLET, FILM COATED ORAL 2 TIMES DAILY
Qty: 6 TABLET | Refills: 0 | Status: SHIPPED | OUTPATIENT
Start: 2020-09-25 | End: 2020-12-02

## 2020-09-25 ASSESSMENT — PAIN SCALES - GENERAL: PAINLEVEL: NO PAIN (0)

## 2020-09-25 ASSESSMENT — MIFFLIN-ST. JEOR: SCORE: 1539.95

## 2020-09-25 NOTE — PROGRESS NOTES
Urology Consult History and Physical  [facility]   Name: Ingrid Villavicencio    MRN: [id number]   YOB: 1960       We were asked to see Ingrid Villavicencio at the request of Dr. Hurtado for evaluation and treatment of Elevated PSA .        Chief Complaint:   Elevated PSA     History is obtained from the patient            History of Present Illness:   Ingrid Villavicencio is a 60 year old male who is being seen for evaluation of Elevated PSA.  He had recent PSA in August 2020 2.8.  No clear records that I could find of a prior PSA.  He denies any voiding symptoms.  He denies any family history of prostate cancer.  He denies any hip or bony tenderness.    Component PSA PSA Diag Urologic Phys   Latest Ref Rng & Units 0 - 4 ug/L 0.00 - 4.00 ng/mL   8/17/2020 22.80 (H)    9/25/2020  30.50 (H)       (4 or >)  Location: Blood  Quality: Elevated PSA  Severity: 22.8 and recheck of 30.5  Duration: August 2020           Past Medical History:     Past Medical History:   Diagnosis Date     Shingles     in the eye            Past Surgical History:   History reviewed. No pertinent surgical history.         Social History:     Social History     Tobacco Use     Smoking status: Never Smoker     Smokeless tobacco: Never Used   Substance Use Topics     Alcohol use: No       History   Smoking Status     Never Smoker   Smokeless Tobacco     Never Used            Family History:   History reviewed. No pertinent family history.           Allergies:     Allergies   Allergen Reactions     Amoxicillin      Upset stomach/diarrhea            Medications:     Current Outpatient Medications   Medication Sig     cholecalciferol 25 MCG (1000 UT) TABS Take 1 tablet by mouth daily     aspirin (ASA) 325 MG EC tablet Take 1 tablet (325 mg) by mouth every 4 hours as needed for moderate pain     pantoprazole (PROTONIX) 20 MG EC tablet Take 1 tablet (20 mg) by mouth daily (Patient not taking: Reported on 9/25/2020)     UNABLE TO FIND MEDICATION NAME: OTC  "supplements     No current facility-administered medications for this visit.              Review of Systems:     Skin: negative  Eyes: negative  Ears/Nose/Throat: negative  Respiratory: No shortness of breath, dyspnea on exertion, cough, or hemoptysis  Cardiovascular: negative  Gastrointestinal: negative  Genitourinary: as above  Musculoskeletal: negative  Neurologic: negative  Psychiatric: negative  Hematologic/Lymphatic/Immunologic: negative  Endocrine: negative          Physical Exam:     Patient Vitals for the past 24 hrs:   BP Pulse SpO2 Height Weight   09/25/20 1519 120/80 73 98 % 1.689 m (5' 6.5\") 78.9 kg (174 lb)     Body mass index is 27.66 kg/m .     General: age-appropriate appearing male in NAD  HEENT: Head AT/NC, EOMI, CN Grossly intact  Lungs: no respiratory distress, or pursed lip breathing  Heart: No obvious jugular venous distension present  Back: no bony midline tenderness, no CVAT bilaterally.  Abdomen: soft, non-distended, non-tender. No organomegaly  : normal male external genitalia.   Rectal: ~30cc gland, firm nodule on the LEFT apex, asymmetric  Lymph: no palpable inguinal lymphadenopathy.  LE: no edema.   Musculoskeltal: extremities normal, no peripheral edema  Skin: no suspicious lesions or rashes  Neuro: Alert, oriented, speech and mentation normal;  moving all 4 extremities equally.  Psych: affect and mood normal          Data:   All laboratory data reviewed:    UA RESULTS:  No results for input(s): COLOR, APPEARANCE, URINEGLC, URINEBILI, URINEKETONE, SG, UBLD, URINEPH, PROTEIN, UROBILINOGEN, NITRITE, LEUKEST, RBCU, WBCU in the last 83897 hours.   Component PSA PSA Diag Urologic Phys   Latest Ref Rng & Units 0 - 4 ug/L 0.00 - 4.00 ng/mL   8/17/2020 22.80 (H)    9/25/2020  30.50 (H)     Lab Results   Component Value Date    CR 0.90 08/17/2020               Impression and Plan:   Impression:   60-year-old man with significantly elevated PSA and abnormal SHANNAN      Plan:   Elevated PSA and " abnormal SHANNAN  -We discussed the use of PSA and SHANNAN a screening test for prostate cancer  -We discussed his initial PSA in August was very concerning at 22.8 and that our recheck today was further worrisome at 30.5  -We discussed the need for prompt further evaluation ideally with an MRI of the prostate followed by a prostate biopsy  -We discussed the use of prostate MRI and the biopsy naïve patient and that growing body of evidence helps to support this practice.  This is been shown to increase the diagnostic accuracy of the initial biopsy and decrease the rate of subsequent biopsies  -Order for the MRI placed  - TRUS/Bx - we discussed the risks and benefits of the prostate biopsy including the normal intermittent hematuria, blood per rectum, and blood with ejaculation as well as a 1-2% risk of post biopsy sepsis requiring hospitalization and IV antibiotics  -Prescription for ciprofloxacin sent to his pharmacy  -We discussed that based on his PSA and abnormal SHANNAN he has a very high likelihood of having a prostate cancer.  We discussed that pending the results of the prostate MRI and prostate biopsy further work-up with a nuclear medicine bone scan will likely be required prior to our ultimate treatment discussion     Thank you for the kind consultation.    Time spent: 40 minutes of which >50% was spent counseling.    Dov Tellez MD   Urology  [facility]  [facility] Phone: [phone]  [facility] Phone: [phone]

## 2020-09-25 NOTE — Clinical Note
Neptali Hurtado,    I saw Ingrid on Friday in consultation for his elevated PSA.  I rechecked his PSA in our office which demonstrated further elevation to 30.5.  His rectal exam was also abnormal with a firm nodule at the left apex.  We discussed that all of these findings together are very concerning for prostate cancer.  I will plan to obtain a prostate MRI followed by a prostate biopsy in the next few weeks and will keep you updated with the biopsy and MRI results and our treatment plan.    Thanks,   Dov Tellez M.D.  Cell: [phone]

## 2020-09-25 NOTE — LETTER
9/25/2020     RE: Ingrid Villavicencio  [address] [address]     Dear Colleague,    Thank you for referring your patient, Ingrid Villavicencio, to the [facility] [place] [facility]. Please see a copy of my visit note below.    Urology Consult History and Physical  [facility]   Name: Ingrid Villavicencio    MRN: [id number]   YOB: 1960       We were asked to see Ingrid Villavicencio at the request of Dr. Hurtado for evaluation and treatment of Elevated PSA .        Chief Complaint:   Elevated PSA     History is obtained from the patient            History of Present Illness:   Ingrid Villavicencio is a 60 year old male who is being seen for evaluation of Elevated PSA.  He had recent PSA in August 2020 2.8.  No clear records that I could find of a prior PSA.  He denies any voiding symptoms.  He denies any family history of prostate cancer.  He denies any hip or bony tenderness.    Component PSA PSA Diag Urologic Phys   Latest Ref Rng & Units 0 - 4 ug/L 0.00 - 4.00 ng/mL   8/17/2020 22.80 (H)    9/25/2020  30.50 (H)       (4 or >)  Location: Blood  Quality: Elevated PSA  Severity: 22.8 and recheck of 30.5  Duration: August 2020           Past Medical History:     Past Medical History:   Diagnosis Date     Shingles     in the eye            Past Surgical History:   History reviewed. No pertinent surgical history.         Social History:     Social History     Tobacco Use     Smoking status: Never Smoker     Smokeless tobacco: Never Used   Substance Use Topics     Alcohol use: No       History   Smoking Status     Never Smoker   Smokeless Tobacco     Never Used            Family History:   History reviewed. No pertinent family history.           Allergies:     Allergies   Allergen Reactions     Amoxicillin      Upset stomach/diarrhea            Medications:     Current Outpatient Medications   Medication Sig     cholecalciferol 25 MCG (1000 UT)  "TABS Take 1 tablet by mouth daily     aspirin (ASA) 325 MG EC tablet Take 1 tablet (325 mg) by mouth every 4 hours as needed for moderate pain     pantoprazole (PROTONIX) 20 MG EC tablet Take 1 tablet (20 mg) by mouth daily (Patient not taking: Reported on 9/25/2020)     UNABLE TO FIND MEDICATION NAME: OTC supplements     No current facility-administered medications for this visit.              Review of Systems:     Skin: negative  Eyes: negative  Ears/Nose/Throat: negative  Respiratory: No shortness of breath, dyspnea on exertion, cough, or hemoptysis  Cardiovascular: negative  Gastrointestinal: negative  Genitourinary: as above  Musculoskeletal: negative  Neurologic: negative  Psychiatric: negative  Hematologic/Lymphatic/Immunologic: negative  Endocrine: negative          Physical Exam:     Patient Vitals for the past 24 hrs:   BP Pulse SpO2 Height Weight   09/25/20 1519 120/80 73 98 % 1.689 m (5' 6.5\") 78.9 kg (174 lb)     Body mass index is 27.66 kg/m .     General: age-appropriate appearing male in NAD  HEENT: Head AT/NC, EOMI, CN Grossly intact  Lungs: no respiratory distress, or pursed lip breathing  Heart: No obvious jugular venous distension present  Back: no bony midline tenderness, no CVAT bilaterally.  Abdomen: soft, non-distended, non-tender. No organomegaly  : normal male external genitalia.   Rectal: ~30cc gland, firm nodule on the LEFT apex, asymmetric  Lymph: no palpable inguinal lymphadenopathy.  LE: no edema.   Musculoskeltal: extremities normal, no peripheral edema  Skin: no suspicious lesions or rashes  Neuro: Alert, oriented, speech and mentation normal;  moving all 4 extremities equally.  Psych: affect and mood normal          Data:   All laboratory data reviewed:    UA RESULTS:  No results for input(s): COLOR, APPEARANCE, URINEGLC, URINEBILI, URINEKETONE, SG, UBLD, URINEPH, PROTEIN, UROBILINOGEN, NITRITE, LEUKEST, RBCU, WBCU in the last 51063 hours.   Component PSA PSA Diag Urologic Phys "   Latest Ref Rng & Units 0 - 4 ug/L 0.00 - 4.00 ng/mL   8/17/2020 22.80 (H)    9/25/2020  30.50 (H)     Lab Results   Component Value Date    CR 0.90 08/17/2020             Impression and Plan:   Impression:   60-year-old man with significantly elevated PSA and abnormal SHANNAN      Plan:   Elevated PSA and abnormal SHANNAN  -We discussed the use of PSA and SHANNAN a screening test for prostate cancer  -We discussed his initial PSA in August was very concerning at 22.8 and that our recheck today was further worrisome at 30.5  -We discussed the need for prompt further evaluation ideally with an MRI of the prostate followed by a prostate biopsy  -We discussed the use of prostate MRI and the biopsy naïve patient and that growing body of evidence helps to support this practice.  This is been shown to increase the diagnostic accuracy of the initial biopsy and decrease the rate of subsequent biopsies  -Order for the MRI placed  - TRUS/Bx - we discussed the risks and benefits of the prostate biopsy including the normal intermittent hematuria, blood per rectum, and blood with ejaculation as well as a 1-2% risk of post biopsy sepsis requiring hospitalization and IV antibiotics  -Prescription for ciprofloxacin sent to his pharmacy  -We discussed that based on his PSA and abnormal SHANNAN he has a very high likelihood of having a prostate cancer.  We discussed that pending the results of the prostate MRI and prostate biopsy further work-up with a nuclear medicine bone scan will likely be required prior to our ultimate treatment discussion     Thank you for the kind consultation.    Time spent: 40 minutes of which >50% was spent counseling.    Dov Tellez MD   Urology  [facility]  [facility] Phone: [phone]  [facility] Phone: [phone]

## 2020-10-01 ENCOUNTER — HOSPITAL ENCOUNTER (OUTPATIENT)
Dept: MRI IMAGING | Facility: CLINIC | Age: 60
Discharge: HOME | End: 2020-10-01
Attending: UROLOGY | Admitting: UROLOGY
Payer: COMMERCIAL

## 2020-10-01 DIAGNOSIS — R97.20 ELEVATED PROSTATE SPECIFIC ANTIGEN (PSA): ICD-10-CM

## 2020-10-01 DIAGNOSIS — N40.2 PROSTATE NODULE: ICD-10-CM

## 2020-10-01 PROCEDURE — 255N000002 HC RX 255 OP 636: Performed by: UROLOGY

## 2020-10-01 PROCEDURE — 72197 MRI PELVIS W/O & W/DYE: CPT | Mod: 26 | Performed by: RADIOLOGY

## 2020-10-01 PROCEDURE — 72197 MRI PELVIS W/O & W/DYE: CPT

## 2020-10-01 PROCEDURE — A9585 GADOBUTROL INJECTION: HCPCS | Performed by: UROLOGY

## 2020-10-01 RX ORDER — GADOBUTROL 604.72 MG/ML
8 INJECTION INTRAVENOUS ONCE
Status: COMPLETED | OUTPATIENT
Start: 2020-10-01 | End: 2020-10-01

## 2020-10-01 RX ADMIN — GADOBUTROL 8 ML: 604.72 INJECTION INTRAVENOUS at 10:14

## 2020-10-12 ENCOUNTER — TRANSFERRED RECORDS (OUTPATIENT)
Dept: HEALTH INFORMATION MANAGEMENT | Facility: CLINIC | Age: 60
End: 2020-10-12

## 2020-10-12 LAB — COLOGUARD-ABSTRACT: NEGATIVE

## 2020-10-14 ENCOUNTER — OFFICE VISIT (OUTPATIENT)
Dept: UROLOGY | Facility: CLINIC | Age: 60
End: 2020-10-14
Payer: COMMERCIAL

## 2020-10-14 VITALS
WEIGHT: 172 LBS | BODY MASS INDEX: 27 KG/M2 | HEART RATE: 85 BPM | HEIGHT: 67 IN | SYSTOLIC BLOOD PRESSURE: 118 MMHG | OXYGEN SATURATION: 98 % | DIASTOLIC BLOOD PRESSURE: 70 MMHG

## 2020-10-14 DIAGNOSIS — N40.2 PROSTATE NODULE: ICD-10-CM

## 2020-10-14 DIAGNOSIS — R97.20 ELEVATED PROSTATE SPECIFIC ANTIGEN (PSA): Primary | ICD-10-CM

## 2020-10-14 DIAGNOSIS — R68.89 ABNORMAL DIGITAL RECTAL EXAM: ICD-10-CM

## 2020-10-14 PROCEDURE — 96372 THER/PROPH/DIAG INJ SC/IM: CPT | Performed by: UROLOGY

## 2020-10-14 PROCEDURE — 55700 PR BIOPSY OF PROSTATE,NEEDLE/PUNCH: CPT | Performed by: UROLOGY

## 2020-10-14 PROCEDURE — 76872 US TRANSRECTAL: CPT | Performed by: UROLOGY

## 2020-10-14 PROCEDURE — 88305 TISSUE EXAM BY PATHOLOGIST: CPT | Performed by: PATHOLOGY

## 2020-10-14 PROCEDURE — 88342 IMHCHEM/IMCYTCHM 1ST ANTB: CPT | Performed by: PATHOLOGY

## 2020-10-14 RX ORDER — GENTAMICIN 40 MG/ML
80 INJECTION, SOLUTION INTRAMUSCULAR; INTRAVENOUS ONCE
Status: COMPLETED | OUTPATIENT
Start: 2020-10-14 | End: 2020-10-14

## 2020-10-14 RX ORDER — LIDOCAINE HYDROCHLORIDE 10 MG/ML
20 INJECTION, SOLUTION EPIDURAL; INFILTRATION; INTRACAUDAL; PERINEURAL ONCE
Status: COMPLETED | OUTPATIENT
Start: 2020-10-14 | End: 2020-10-14

## 2020-10-14 RX ADMIN — GENTAMICIN 80 MG: 40 INJECTION, SOLUTION INTRAMUSCULAR; INTRAVENOUS at 08:00

## 2020-10-14 RX ADMIN — LIDOCAINE HYDROCHLORIDE 20 ML: 10 INJECTION, SOLUTION EPIDURAL; INFILTRATION; INTRACAUDAL; PERINEURAL at 15:25

## 2020-10-14 ASSESSMENT — PAIN SCALES - GENERAL
PAINLEVEL: SEVERE PAIN (6)
PAINLEVEL: NO PAIN (0)

## 2020-10-14 ASSESSMENT — MIFFLIN-ST. JEOR
SCORE: 1530.88
SCORE: 1530.88

## 2020-10-14 NOTE — PROGRESS NOTES
PHYSICIANS NOTE: TRANSRECTAL ULTRASOUND AND BIOPSY PROCEDURE: 10/14/2020   JACQUELIN    Sign In   History and Physical Exam reviewed and is unchanged.     Primary Diagnosis: Elevated psa (primary encounter diagnosis)   Prostate cancer     Informed Consent Discussed: Yes. Risks, benefits, alternatives and personnel discussed with patient who consents to proceed.     Sign in Communication: Completed   Time Out: Team Confirms the Correct Patient,   Correct Procedure; Transrectal Ultrasound and Biopsy, Correct Site and Site Marking (not applicable), Correct Position.   Affirmation of Time Out: YES   Sign Out: Sign Out Discussion: Completed   Patient history and ROS confirmed unchanged from last office visit.   Family history for prostate cancer is: unknown   Audible Time Out: Yes     TRUS PROCEDURE WITH BIOPSY - URONAV FUSION BIOPSY    The patient was placed in the lateral decubitus position.     Rectal: ~30cc gland, firm nodule on the LEFT apex, asymmetric    The ultrasound probe was placed into the rectum and the prostate visualized.   Approximately five cc plain lidocaine was injected bilaterally into the perioprostatic nerves.     The prostate was visualized in both planes and no hypoechoic lesion identified.     The total prostate volume was 35.6 gm     The patient underwent biopsy removing 15 total cores. 3 cores from the MRI LEFT apex target and 12 standard cores.       Component PSA PSA Diag Urologic Phys   Latest Ref Rng & Units 0 - 4 ug/L 0.00 - 4.00 ng/mL   8/17/2020 22.80 (H)     9/25/2020   30.50 (H)      MRI PROSTATE:  FINDINGS:  Size: 3.5 x 4.8 x 3.8 cm  Volume: 33.2 mL  Hemorrhage: Absent  Peripheral zone: Heterogeneous on T2-weighted images. Suspicious  lesions as detailed below.  Transition zone: Nonenlarged. Transition zone nodules which are  circumscribed or mostly encapsulated without diffusion restriction.   PI-RADS 2.  No highly suspicious nodules.     Lesion(s) in rank order of severity (highest  score- to lowest score,  then by size)      Lesion 1:  Location: Left apex peripheral zone at the 5-6 o'clock position  relative to the urethra. Series 5 image 62.   Additional prostate regions involved: There is abutment of the  external urethral sphincter.  Size: 28 mm  T2 description: Moderate hypointense  T2 numerical assessment: 5  DWI description: Hypointense on ADC and hyperintense on high b-value  DWI  DWI numerical assessment: 5  DCE assessment: Positive    Prostate margin: Capsular abutment 6-15 mm with focal bulging. The  mass is also extending anteriorly and abutting the external urinary  sphincter (series 17 image 18) as well as extending superiorly is a  linear band invading the inferomedial bilateral seminal vesicles  (series 17 image 10).  Lesion overall PI-RADS category: 5     Neurovascular bundles: No neurovascular bundle involvement by  malignancy.  Seminal vesicles: Both seminal vesicles are involved by malignancy.  Lymph nodes: No lymph node involvement  Bones: No suspicious lesions  Other pelvic organs: No additional findings.                                                                      IMPRESSION:  1. Based on the most suspicious abnormality, this exam is  characterized as PIRADS 5 - Clinically significant cancer is highly  likely to be present.  The most suspicious abnormality is located at  the left apex peripheral zone at the 5-6 o'clock position relative to  the urethra and there is capsular bulging indicating moderately high  suspicion of minimal extraprostatic extension. The mass is also  extending anteriorly and abutting the external urinary sphincter as  well as extending superiorly and invading the inferomedial seminal  vesicles.  2. No suspicious adenopathy or evidence of pelvic metastases.     ----------     Complications: None     Follow-up: I will send results via [employer] and follow up next week.    Dov Tellez MD

## 2020-10-14 NOTE — NURSING NOTE
Chief Complaint   Patient presents with     Elevated PSA     Patient here today for Transrectal Ultrasound guided prostate biopsy         Procedure was explained to patient prior to performing said procedure. The patient signed the consent form and all questions were answered prior to the procedure. Any pre-procedural antibiotics were given according to the performing physicians recommendation. Pt's information was confirmed on samples and samples were sent for analysis. [facility] reviewed information on labels sent with patient and confirmed the accuracy of all the labels.    Consent read and signed: Yes     Allergies   Allergen Reactions     Amoxicillin      Upset stomach/diarrhea     Performing Physician: Dr. Tellez  Antibiotic taken?  YES  Aspirin or other blood thinning medications discontinued 7-10 days:  yes  Time of Fleet's enema:  7:00am  Patient given Gentamicin intramuscular injection 30 minutes prior to procedure Yes    12 samples were taken from the right and left, medial and lateral base, mid, and apex of the prostate respectively. YES additional samples were taken.  Vitals were repeated prior to patient leaving and instructions for post Transrectal Ultrasound Guided Prostate Biopsy care were explained to the patient.      The following medication was given:     MEDICATION:  Lidocaine 1% Soln  ROUTE: RECTAL  SITE: PROSTATE  DOSE: 15ML  LOT #: [id number]  : [employer]  EXPIRATION DATE: 08/01/2021  NDC#: 1741-1854-68  Was there drug waste? Yes  Amount of drug waste (mL): 5ML.  Reason for waste:  Single use vial  Multi-dose vial: No    The following medication was given:     MEDICATION:  GENTAMICIN  ROUTE: IM  SITE: LUQ - Gluteus  DOSE: 80MG  LOT #: [id number]  : [employer]  EXPIRATION DATE: 08/21  NDC#: 95622-474-35  Was there drug waste? No  Multi-dose vial: No      SOPHIA Jerome

## 2020-10-14 NOTE — PATIENT INSTRUCTIONS
[facility]  Transrectal Ultrasound  Post Operative Information    The physician who performed your Transrectal Ultrasound is Dr. Tellez (telephone number [phone]).  Please contact this doctor if you have any problems or questions.  If unable to reach your doctor, please return to the Emergency Department.      Take one antibiotic the evening of the procedure and then as directed on your prescription.    Drink at least 6-8 glasses of fluids for the first 48 hours.    Avoid heavy lifting and strenuous activity for 48 hours.    Avoid sexual intercourse for the first 24 hours.    No aspirin or ibuprofen products (Motrin, Advil, Nuprin, ect.) for one week.  You may take acetaminophen (Tylenol) for pain.    You may notice a small amount of blood on the tissue after a bowel movement.    You may pass blood with clots in your urine following the procedure.  The amount will decrease with time but may be visible for up to two weeks.     You make have blood in your semen for 4 weeks after the procedure.    You may experience mild perineal (groin area) discomfort after the procedure.    Please call you doctor if you have any of the follow symptoms:  Fever  Increase in the amount of blood passed  Severe discomfort or pain

## 2020-10-14 NOTE — LETTER
10/14/2020       RE: Ingrid Villavicencio  [address] [address]     Dear Colleague,    Thank you for referring your patient, Ingrid Villavicencio, to the [facility] [place] at [facility]. Please see a copy of my visit note below.    PHYSICIANS NOTE: TRANSRECTAL ULTRASOUND AND BIOPSY PROCEDURE: 10/14/2020   JACQUELIN    Sign In   History and Physical Exam reviewed and is unchanged.     Primary Diagnosis: Elevated psa (primary encounter diagnosis)   Prostate cancer     Informed Consent Discussed: Yes. Risks, benefits, alternatives and personnel discussed with patient who consents to proceed.     Sign in Communication: Completed   Time Out: Team Confirms the Correct Patient,   Correct Procedure; Transrectal Ultrasound and Biopsy, Correct Site and Site Marking (not applicable), Correct Position.   Affirmation of Time Out: YES   Sign Out: Sign Out Discussion: Completed   Patient history and ROS confirmed unchanged from last office visit.   Family history for prostate cancer is: unknown   Audible Time Out: Yes     TRUS PROCEDURE WITH BIOPSY - URONAV FUSION BIOPSY    The patient was placed in the lateral decubitus position.     Rectal: ~30cc gland, firm nodule on the LEFT apex, asymmetric    The ultrasound probe was placed into the rectum and the prostate visualized.   Approximately five cc plain lidocaine was injected bilaterally into the perioprostatic nerves.     The prostate was visualized in both planes and no hypoechoic lesion identified.     The total prostate volume was 35.6 gm     The patient underwent biopsy removing 15 total cores. 3 cores from the MRI LEFT apex target and 12 standard cores.       Component PSA PSA Diag Urologic Phys   Latest Ref Rng & Units 0 - 4 ug/L 0.00 - 4.00 ng/mL   8/17/2020 22.80 (H)     9/25/2020   30.50 (H)      MRI PROSTATE:  FINDINGS:  Size: 3.5 x 4.8 x 3.8 cm  Volume: 33.2 mL  Hemorrhage: Absent  Peripheral zone:  Heterogeneous on T2-weighted images. Suspicious  lesions as detailed below.  Transition zone: Nonenlarged. Transition zone nodules which are  circumscribed or mostly encapsulated without diffusion restriction.   PI-RADS 2.  No highly suspicious nodules.     Lesion(s) in rank order of severity (highest score- to lowest score,  then by size)      Lesion 1:  Location: Left apex peripheral zone at the 5-6 o'clock position  relative to the urethra. Series 5 image 62.   Additional prostate regions involved: There is abutment of the  external urethral sphincter.  Size: 28 mm  T2 description: Moderate hypointense  T2 numerical assessment: 5  DWI description: Hypointense on ADC and hyperintense on high b-value  DWI  DWI numerical assessment: 5  DCE assessment: Positive    Prostate margin: Capsular abutment 6-15 mm with focal bulging. The  mass is also extending anteriorly and abutting the external urinary  sphincter (series 17 image 18) as well as extending superiorly is a  linear band invading the inferomedial bilateral seminal vesicles  (series 17 image 10).  Lesion overall PI-RADS category: 5     Neurovascular bundles: No neurovascular bundle involvement by  malignancy.  Seminal vesicles: Both seminal vesicles are involved by malignancy.  Lymph nodes: No lymph node involvement  Bones: No suspicious lesions  Other pelvic organs: No additional findings.                                                                      IMPRESSION:  1. Based on the most suspicious abnormality, this exam is  characterized as PIRADS 5 - Clinically significant cancer is highly  likely to be present.  The most suspicious abnormality is located at  the left apex peripheral zone at the 5-6 o'clock position relative to  the urethra and there is capsular bulging indicating moderately high  suspicion of minimal extraprostatic extension. The mass is also  extending anteriorly and abutting the external urinary sphincter as  well as extending  superiorly and invading the inferomedial seminal  vesicles.  2. No suspicious adenopathy or evidence of pelvic metastases.     ----------     Complications: None     Follow-up: I will send results via [employer] and follow up next week.    Dov Tellez MD

## 2020-10-16 ENCOUNTER — TELEPHONE (OUTPATIENT)
Dept: UROLOGY | Facility: CLINIC | Age: 60
End: 2020-10-16

## 2020-10-16 DIAGNOSIS — C61 PROSTATE CANCER (H): Primary | ICD-10-CM

## 2020-10-16 LAB — COPATH REPORT: NORMAL

## 2020-10-16 NOTE — TELEPHONE ENCOUNTER
Per MD-Call patient with results of recent prostate biopsy. Results in Result notes.     Renee Moon LPN

## 2020-10-19 ENCOUNTER — TELEPHONE (OUTPATIENT)
Dept: UROLOGY | Facility: CLINIC | Age: 60
End: 2020-10-19

## 2020-10-19 NOTE — TELEPHONE ENCOUNTER
Called Ingrid with the information as below. He will try to scheduled the bone scan for before his appt with Dr. Tellez on 10/21, but if he's not able to get this done before, he should still keep the appt on 10/21. He expressed understanding and will schedule bone scan.  CHERI Phan RN      ----- Message from Dov Tellez MD sent at 10/16/2020  3:55 PM CDT -----  Neptali Villavicencio,    Here are the results of your recent prostate biopsy.    Diamond scoring:  The [place] scoring system is used by pathologists to give prostate cancer a risk score.  It is comprised of 2 numbers, each going from 1-5, with a score of 3 indicating prostate cancer.  The first number is the most common type of cell structure that they see, and the second number is the second most common type.  We add these numbers together to get the final Diamond score.  Therefore, prostate cancer scoring goes from 6 (3+3) - 10 (5+5).  We make our treatment recommendations based on the highest Diamond score.    Your biopsy contained Diamond 4+5=9 prostate cancer    Generally speaking there are 3 ways that we manage prostate cancer: Active surveillance, surgery, or radiation.    Given your [place] score you would be a candidate for SURGERY OR RADIATION, and we will discuss them further at your scheduled appointment.    You can read more about these treatment options at:  [email or web address]    Plan:  - Given High grade diamond score pt needs NM bone scan, order placed    Dov Tellez M.D.

## 2020-10-21 ENCOUNTER — VIRTUAL VISIT (OUTPATIENT)
Dept: UROLOGY | Facility: CLINIC | Age: 60
End: 2020-10-21
Payer: COMMERCIAL

## 2020-10-21 VITALS — WEIGHT: 172 LBS | HEIGHT: 67 IN | BODY MASS INDEX: 27 KG/M2

## 2020-10-21 DIAGNOSIS — C61 PROSTATE CANCER (H): Primary | ICD-10-CM

## 2020-10-21 PROCEDURE — 99215 OFFICE O/P EST HI 40 MIN: CPT | Mod: GT | Performed by: UROLOGY

## 2020-10-21 ASSESSMENT — PAIN SCALES - GENERAL: PAINLEVEL: NO PAIN (0)

## 2020-10-21 ASSESSMENT — MIFFLIN-ST. JEOR: SCORE: 1530.88

## 2020-10-21 NOTE — PROGRESS NOTES
"Ingrid Villavicencio is a 60 year old male who is being evaluated via a billable video visit.      The patient has been notified of following:     \"This video visit will be conducted via a call between you and your physician/provider. We have found that certain health care needs can be provided without the need for an in-person physical exam.  This service lets us provide the care you need with a video conversation.  If a prescription is necessary we can send it directly to your pharmacy.  If lab work is needed we can place an order for that and you can then stop by our lab to have the test done at a later time.    Video visits are billed at different rates depending on your insurance coverage.  Please reach out to your insurance provider with any questions.    If during the course of the call the physician/provider feels a video visit is not appropriate, you will not be charged for this service.\"    Patient has given verbal consent for Video visit? Yes  How would you like to obtain your AVS? MyChart  If you are dropped from the video visit, the video invite should be resent to: Text to cell phone: [phone]  Will anyone else be joining your video visit? No      Video-Visit Details    Type of service:  Video Visit    Video Start Time: 3:30  Video End Time: 4:10 PM    Originating Location (pt. Location): Home    Distant Location (provider location):  [facility] [place]     Platform used for Video Visit: Aziza Tellez MD        "

## 2020-10-21 NOTE — NURSING NOTE
Chief Complaint   Patient presents with     Elevated PSA     Patient will a have a video visit with Dr Tellez to discuss his Biopsy result         SOPHIA Jerome

## 2020-10-21 NOTE — PROGRESS NOTES
"[facility]  CHIEF COMPLAINT   It was my pleasure to see Ingrid Villavicencio who is a 60 year old male for follow-up of prostate cancer .      HPI   Ingrid Villavicencio is a very pleasant 60 year old male    Initially seen 9/25/20:  \"Ingrid Villavicencio is a 60 year old male who is being seen for evaluation of Elevated PSA.  He had recent PSA in August 2020 2.8.  No clear records that I could find of a prior PSA.  He denies any voiding symptoms.  He denies any family history of prostate cancer.  He denies any hip or bony tenderness.\"    10/14/20:  MR- fusion biopsy    TODAY:  Doing well since the biopsy    PHYSICAL EXAM  Patient is a 60 year old  male   Vitals: Height 1.689 m (5' 6.5\"), weight 78 kg (172 lb).  Body mass index is 27.35 kg/m .  General Appearance Adult:   Alert, no acute distress, oriented  HENT: throat/mouth:normal, good dentition  Lungs: no respiratory distress, or pursed lip breathing  Heart: No obvious jugular venous distension present  Abdomen: non - distended  Musculoskeltal: extremities normal, no peripheral edema  Skin: no suspicious lesions or rashes  Neuro: Alert, oriented, speech and mentation normal  Psych: affect and mood normal  Gait: Normal   Rectal: ~30cc gland, firm nodule on the LEFT apex, asymmetric    Component PSA PSA Diag Urologic Phys   Latest Ref Rng & Units 0 - 4 ug/L 0.00 - 4.00 ng/mL   8/17/2020 22.80 (H)    9/25/2020  30.50 (H)       PATHOLOGY:  FINAL DIAGNOSIS:   A.  Prostate, left lateral base , biopsy   - Benign prostatic tissue. Negative for malignancy     B.  Prostate, left lateral mid, biopsy   - Prostatic adenocarcinoma, acinar type, [place]'s grade 3+ 3 = 6, grade   group is1, number of cores involved   is 1 of 1, total surface area involved is <5 %,perineural invasion is not   identified     C.  Prostate, left lateral apex, biopsy   - Prostatic adenocarcinoma, acinar type, Wolfgang's grade 4 +4 = 8, grade   group is 4, number of cores involved   is 1 of 1, total surface area involved " is 40 %,perineural invasion is not   identified     D.  Prostate, left base, biopsy   - Benign prostatic tissue. Negative for malignancy     E.  Prostate, left mid, biopsy   - Prostatic adenocarcinoma, acinar type, [place]'s grade 4 + 5 = 9, grade   group is 5, number of cores involved   is 1 of 1, total surface area involved is 75 %,perineural invasion is   identified. Percentage of grade 4 = 90%,   percentage of grade 5 = 10% (Please see comment)     F.  Prostate, left apex, biopsy   - Prostatic adenocarcinoma, acinar type, Wolfgang's grade 4 + 5 = 9, grade   group is 5, number of cores involved   is 1 of 1, total surface area involved is 90 %,perineural invasion is   identified. Percentage of grade 4 = 95%,   percentage of grade 5 = 5% (Please see comment)     G.  Prostate, right lateral base, biopsy   - Benign prostatic tissue. Negative for malignancy     H.  Prostate, right lateral mid, biopsy   - Benign prostatic tissue. Negative for malignancy     I.  Prostate, right lateral apex, biopsy   -Focal high grade prostatic intraepithelial neoplasia. Negative for   invasive malignancy.     J.  Prostate, right base, biopsy   - Benign prostatic tissue. Negative for malignancy     K.  Prostate, right mid, biopsy   - Benign prostatic tissue. Negative for malignan.cy     L.  Prostate, right apex, biopsy   - Focal high-grade prostatic intraepithelial neoplasia. Negative for   invasive malignancy.     M. Prostate, left [place] lesion x3, biopsy-   - Prostatic adenocarcinoma, acinar type, Lerona's grade 4 + 5 = 9, grade   group is 5, number of cores involved   is 3 of 3, total surface area involved is 95 %,perineural invasion is   identified. Percentage of grade 4 = 95%,   percentage of grade 5 = 5% (Please see comment)     IMAGING:  All pertinent imaging reviewed:    All imaging studies reviewed by me.  I personally reviewed these imaging films.  A formal report from radiology will follow.    FINDINGS:  Size: 3.5 x 4.8 x 3.8  cm  Volume: 33.2 mL  Hemorrhage: Absent  Peripheral zone: Heterogeneous on T2-weighted images. Suspicious  lesions as detailed below.  Transition zone: Nonenlarged. Transition zone nodules which are  circumscribed or mostly encapsulated without diffusion restriction.   PI-RADS 2.  No highly suspicious nodules.     Lesion(s) in rank order of severity (highest score- to lowest score,  then by size)      Lesion 1:  Location: Left apex peripheral zone at the 5-6 o'clock position  relative to the urethra. Series 5 image 62.   Additional prostate regions involved: There is abutment of the  external urethral sphincter.  Size: 28 mm  T2 description: Moderate hypointense  T2 numerical assessment: 5  DWI description: Hypointense on ADC and hyperintense on high b-value  DWI  DWI numerical assessment: 5  DCE assessment: Positive    Prostate margin: Capsular abutment 6-15 mm with focal bulging. The  mass is also extending anteriorly and abutting the external urinary  sphincter (series 17 image 18) as well as extending superiorly is a  linear band invading the inferomedial bilateral seminal vesicles  (series 17 image 10).  Lesion overall PI-RADS category: 5     Neurovascular bundles: No neurovascular bundle involvement by  malignancy.  Seminal vesicles: Both seminal vesicles are involved by malignancy.  Lymph nodes: No lymph node involvement  Bones: No suspicious lesions  Other pelvic organs: No additional findings.                                                                   IMPRESSION:  1. Based on the most suspicious abnormality, this exam is  characterized as PIRADS 5 - Clinically significant cancer is highly  likely to be present.  The most suspicious abnormality is located at  the left apex peripheral zone at the 5-6 o'clock position relative to  the urethra and there is capsular bulging indicating moderately high  suspicion of minimal extraprostatic extension. The mass is also  extending anteriorly and abutting the  external urinary sphincter as  well as extending superiorly and invading the inferomedial seminal  vesicles.  2. No suspicious adenopathy or evidence of pelvic metastases.    ASSESSMENT and PLAN  60 year old man with PSA 30.50 and [place] 4+5=9 prostate cancer with MRI showing PIRADs 5 lesion with high suspicion of extra-prostatic extension and possible SV invasion. No evidence of lymphadenopathy.    MSK Nomogram:  Cancer-specific survival after RALP:  99% (10 years) 98%(15 years)  Progression-free % after RALP:  14% (5 years)  8% (10 years)  Organ-confined disease:   4%  Extra-prostatic extension:   95%  Lymph node involvement:   65%  Seminal Vesicle invasion:   58%    DISCUSSION PROSTATE CANCER     The natural history of this disease was explained to the patient at length and the treatment options discussed including radical prostatectomy by open or robotic-assisted laparoscopic approach, external-beam radiotherapy, brachytherapy, and watchful waiting, including the probability of success and complications associated with these approaches.    With respect to watchful waiting, we discussed the difficulties of estimating the extent of disease preoperatively, the risk of cancer progression, and risk that salvage might not be possible with progression. However, the favorable 10-year outcomes of active surveillance in appropriately selected patients was conveyed.  We discussed that based on his Wolfgang score he would not be a candidate for active surveillance    With respect to seed implants, we discussed risks including but not limited to cancer recurrence, exacerbation of voiding symptoms or hematuria, urinary retention, induction of 2nd malignancy, and risks of rectal symptoms or bleeding.  We also discussed risks of the anesthesia including but not limited to MI, CVA, DVT, and PE.      With respect to EBRT, we discussed we discussed risks including but not limited to cancer recurrence, exacerbation of voiding  symptoms or hematuria, urinary retention, incontinence, stricture of the urinary tract, induction of 2nd malignancy, and risks of rectal symptoms or bleeding.  We discussed fact that rectal symptoms may be more common with EBRT than with other treatment modalities.     With radiation therapy, we discussed the difficulties in diagnosing recurrent disease at an early stage due to variability in PSA levels and the fact that most patients are not candidates for local salvage therapy when biochemical recurrence is declared.  We also discussed the significant morbidity of local salvage therapy in terms of perioperative complications, erectile dysfunction and urinary incontinence.    With respect to radical prostatectomy, the pros and cons of open vs robotic-assisted laparoscopic prostatectomy were discussed. The complications of this procedure were reviewed with the patient and include (but not limited to) urinary incontinence, erectile dysfunction, infertility, anastomotic stricture, lymphocele, hemorrhage requiring transfusion, rectal, ureteral, or nerve injury, infection, cardiovascular, pulmonary, thromboembolic, and anesthetic complications.  For robotic prostatectomy, the additional complications of anastomotic urine leak and small bowel obstruction from adhesions was conveyed. The anticipated post-operative course was explained, including an anticipated 1 night hospital stay.  We discussed that given his extensive disease on the left side I would not attempt a nerve sparing procedure on that side.  A right-sided nerve spare would be attempted.    With surgery, we also discussed the potential advantage over radiation therapy in that biochemical recurrence can be detected at a relatively earlier stage and that salvage radiotherapy is successful in controlling recurrent disease in a substantial proportion of patients.  I also conveyed that salvage radiotherapy was associated with a considerably more favorable  morbidity profile compared to local salvage therapies for radiorecurent disease.     All questions were answered.    Patient has decided he would like to proceed with Robotic Radical Prostatectomy - 30739    The risks, benefits, alternatives, and personnel involved in the procudure were discussed.  All questions were answered.  A written informed consent will be finalized on the morning of the procedure.    Plan:  -Nuclear medicine bone scan scheduled for 10/26/2020  -We will send him these results and if negative will proceed with surgery scheduling  -We will place orders for surgery rec to the results of his bone scan    I spent over 40 minutes with the patient.  Over half this time was spent on counseling regarding the above.    Dov Tellez MD   Urology  [facility]  [facility] Phone: [phone]  [facility] Phone: [phone]

## 2020-10-21 NOTE — LETTER
"10/21/2020       RE: Ingrid Villavicencio  [address]  [place] [address]     Dear Colleague,    Thank you for referring your patient, Ingrid Villavicencio, to the [facility] [place] at [facility]. Please see a copy of my visit note below.    Ingrid Villavicencio is a 60 year old male who is being evaluated via a billable video visit.      The patient has been notified of following:     \"This video visit will be conducted via a call between you and your physician/provider. We have found that certain health care needs can be provided without the need for an in-person physical exam.  This service lets us provide the care you need with a video conversation.  If a prescription is necessary we can send it directly to your pharmacy.  If lab work is needed we can place an order for that and you can then stop by our lab to have the test done at a later time.    Video visits are billed at different rates depending on your insurance coverage.  Please reach out to your insurance provider with any questions.    If during the course of the call the physician/provider feels a video visit is not appropriate, you will not be charged for this service.\"    Patient has given verbal consent for Video visit? Yes  How would you like to obtain your AVS? MyChart  If you are dropped from the video visit, the video invite should be resent to: Text to cell phone: [phone]  Will anyone else be joining your video visit? No      Video-Visit Details    Type of service:  Video Visit    Video Start Time: 3:30  Video End Time: 4:10 PM    Originating Location (pt. Location): Home    Distant Location (provider location):  [facility] [place]     Platform used for Video Visit: MD JACQUELIN Canchola  CHIEF COMPLAINT   It was my pleasure to see Ingrid Villavicencio who is a 60 year old male for follow-up of prostate cancer .      HPI   Ingrid Villavicencio is a very " "pleasant 60 year old male    Initially seen 9/25/20:  \"Ingrid Villavicencio is a 60 year old male who is being seen for evaluation of Elevated PSA.  He had recent PSA in August 2020 2.8.  No clear records that I could find of a prior PSA.  He denies any voiding symptoms.  He denies any family history of prostate cancer.  He denies any hip or bony tenderness.\"    10/14/20:  MR- fusion biopsy    TODAY:  Doing well since the biopsy    PHYSICAL EXAM  Patient is a 60 year old  male   Vitals: Height 1.689 m (5' 6.5\"), weight 78 kg (172 lb).  Body mass index is 27.35 kg/m .  General Appearance Adult:   Alert, no acute distress, oriented  HENT: throat/mouth:normal, good dentition  Lungs: no respiratory distress, or pursed lip breathing  Heart: No obvious jugular venous distension present  Abdomen: non - distended  Musculoskeltal: extremities normal, no peripheral edema  Skin: no suspicious lesions or rashes  Neuro: Alert, oriented, speech and mentation normal  Psych: affect and mood normal  Gait: Normal   Rectal: ~30cc gland, firm nodule on the LEFT apex, asymmetric    Component PSA PSA Diag Urologic Phys   Latest Ref Rng & Units 0 - 4 ug/L 0.00 - 4.00 ng/mL   8/17/2020 22.80 (H)    9/25/2020  30.50 (H)       PATHOLOGY:  FINAL DIAGNOSIS:   A.  Prostate, left lateral base , biopsy   - Benign prostatic tissue. Negative for malignancy     B.  Prostate, left lateral mid, biopsy   - Prostatic adenocarcinoma, acinar type, [place]'s grade 3+ 3 = 6, grade   group is1, number of cores involved   is 1 of 1, total surface area involved is <5 %,perineural invasion is not   identified     C.  Prostate, left lateral apex, biopsy   - Prostatic adenocarcinoma, acinar type, [place]'s grade 4 +4 = 8, grade   group is 4, number of cores involved   is 1 of 1, total surface area involved is 40 %,perineural invasion is not   identified     D.  Prostate, left base, biopsy   - Benign prostatic tissue. Negative for malignancy     E.  Prostate, left mid, " biopsy   - Prostatic adenocarcinoma, acinar type, Wolfgang's grade 4 + 5 = 9, grade   group is 5, number of cores involved   is 1 of 1, total surface area involved is 75 %,perineural invasion is   identified. Percentage of grade 4 = 90%,   percentage of grade 5 = 10% (Please see comment)     F.  Prostate, left apex, biopsy   - Prostatic adenocarcinoma, acinar type, [place]'s grade 4 + 5 = 9, grade   group is 5, number of cores involved   is 1 of 1, total surface area involved is 90 %,perineural invasion is   identified. Percentage of grade 4 = 95%,   percentage of grade 5 = 5% (Please see comment)     G.  Prostate, right lateral base, biopsy   - Benign prostatic tissue. Negative for malignancy     H.  Prostate, right lateral mid, biopsy   - Benign prostatic tissue. Negative for malignancy     I.  Prostate, right lateral apex, biopsy   -Focal high grade prostatic intraepithelial neoplasia. Negative for   invasive malignancy.     J.  Prostate, right base, biopsy   - Benign prostatic tissue. Negative for malignancy     K.  Prostate, right mid, biopsy   - Benign prostatic tissue. Negative for malignan.cy     L.  Prostate, right apex, biopsy   - Focal high-grade prostatic intraepithelial neoplasia. Negative for   invasive malignancy.     M. Prostate, left [place] lesion x3, biopsy-   - Prostatic adenocarcinoma, acinar type, Wolfgang's grade 4 + 5 = 9, grade   group is 5, number of cores involved   is 3 of 3, total surface area involved is 95 %,perineural invasion is   identified. Percentage of grade 4 = 95%,   percentage of grade 5 = 5% (Please see comment)     IMAGING:  All pertinent imaging reviewed:    All imaging studies reviewed by me.  I personally reviewed these imaging films.  A formal report from radiology will follow.    FINDINGS:  Size: 3.5 x 4.8 x 3.8 cm  Volume: 33.2 mL  Hemorrhage: Absent  Peripheral zone: Heterogeneous on T2-weighted images. Suspicious  lesions as detailed below.  Transition zone: Nonenlarged.  Transition zone nodules which are  circumscribed or mostly encapsulated without diffusion restriction.   PI-RADS 2.  No highly suspicious nodules.     Lesion(s) in rank order of severity (highest score- to lowest score,  then by size)      Lesion 1:  Location: Left apex peripheral zone at the 5-6 o'clock position  relative to the urethra. Series 5 image 62.   Additional prostate regions involved: There is abutment of the  external urethral sphincter.  Size: 28 mm  T2 description: Moderate hypointense  T2 numerical assessment: 5  DWI description: Hypointense on ADC and hyperintense on high b-value  DWI  DWI numerical assessment: 5  DCE assessment: Positive    Prostate margin: Capsular abutment 6-15 mm with focal bulging. The  mass is also extending anteriorly and abutting the external urinary  sphincter (series 17 image 18) as well as extending superiorly is a  linear band invading the inferomedial bilateral seminal vesicles  (series 17 image 10).  Lesion overall PI-RADS category: 5     Neurovascular bundles: No neurovascular bundle involvement by  malignancy.  Seminal vesicles: Both seminal vesicles are involved by malignancy.  Lymph nodes: No lymph node involvement  Bones: No suspicious lesions  Other pelvic organs: No additional findings.                                                                   IMPRESSION:  1. Based on the most suspicious abnormality, this exam is  characterized as PIRADS 5 - Clinically significant cancer is highly  likely to be present.  The most suspicious abnormality is located at  the left apex peripheral zone at the 5-6 o'clock position relative to  the urethra and there is capsular bulging indicating moderately high  suspicion of minimal extraprostatic extension. The mass is also  extending anteriorly and abutting the external urinary sphincter as  well as extending superiorly and invading the inferomedial seminal  vesicles.  2. No suspicious adenopathy or evidence of pelvic  metastases.    ASSESSMENT and PLAN  60 year old man with PSA 30.50 and [place] 4+5=9 prostate cancer with MRI showing PIRADs 5 lesion with high suspicion of extra-prostatic extension and possible SV invasion. No evidence of lymphadenopathy.    MSK Nomogram:  Cancer-specific survival after RALP:  99% (10 years) 98%(15 years)  Progression-free % after RALP:  14% (5 years)  8% (10 years)  Organ-confined disease:   4%  Extra-prostatic extension:   95%  Lymph node involvement:   65%  Seminal Vesicle invasion:   58%    DISCUSSION PROSTATE CANCER     The natural history of this disease was explained to the patient at length and the treatment options discussed including radical prostatectomy by open or robotic-assisted laparoscopic approach, external-beam radiotherapy, brachytherapy, and watchful waiting, including the probability of success and complications associated with these approaches.    With respect to watchful waiting, we discussed the difficulties of estimating the extent of disease preoperatively, the risk of cancer progression, and risk that salvage might not be possible with progression. However, the favorable 10-year outcomes of active surveillance in appropriately selected patients was conveyed.  We discussed that based on his Wolfgang score he would not be a candidate for active surveillance    With respect to seed implants, we discussed risks including but not limited to cancer recurrence, exacerbation of voiding symptoms or hematuria, urinary retention, induction of 2nd malignancy, and risks of rectal symptoms or bleeding.  We also discussed risks of the anesthesia including but not limited to MI, CVA, DVT, and PE.      With respect to EBRT, we discussed we discussed risks including but not limited to cancer recurrence, exacerbation of voiding symptoms or hematuria, urinary retention, incontinence, stricture of the urinary tract, induction of 2nd malignancy, and risks of rectal symptoms or bleeding.  We  discussed fact that rectal symptoms may be more common with EBRT than with other treatment modalities.     With radiation therapy, we discussed the difficulties in diagnosing recurrent disease at an early stage due to variability in PSA levels and the fact that most patients are not candidates for local salvage therapy when biochemical recurrence is declared.  We also discussed the significant morbidity of local salvage therapy in terms of perioperative complications, erectile dysfunction and urinary incontinence.    With respect to radical prostatectomy, the pros and cons of open vs robotic-assisted laparoscopic prostatectomy were discussed. The complications of this procedure were reviewed with the patient and include (but not limited to) urinary incontinence, erectile dysfunction, infertility, anastomotic stricture, lymphocele, hemorrhage requiring transfusion, rectal, ureteral, or nerve injury, infection, cardiovascular, pulmonary, thromboembolic, and anesthetic complications.  For robotic prostatectomy, the additional complications of anastomotic urine leak and small bowel obstruction from adhesions was conveyed. The anticipated post-operative course was explained, including an anticipated 1 night hospital stay.  We discussed that given his extensive disease on the left side I would not attempt a nerve sparing procedure on that side.  A right-sided nerve spare would be attempted.    With surgery, we also discussed the potential advantage over radiation therapy in that biochemical recurrence can be detected at a relatively earlier stage and that salvage radiotherapy is successful in controlling recurrent disease in a substantial proportion of patients.  I also conveyed that salvage radiotherapy was associated with a considerably more favorable morbidity profile compared to local salvage therapies for radiorecurent disease.     All questions were answered.    Patient has decided he would like to proceed with  Robotic Radical Prostatectomy - [id number]    The risks, benefits, alternatives, and personnel involved in the procudure were discussed.  All questions were answered.  A written informed consent will be finalized on the morning of the procedure.    Plan:  -Nuclear medicine bone scan scheduled for 10/26/2020  -We will send him these results and if negative will proceed with surgery scheduling  -We will place orders for surgery rec to the results of his bone scan    I spent over 40 minutes with the patient.  Over half this time was spent on counseling regarding the above.    Dov Tellez MD   Urology  [facility]  [facility] Phone: [phone]  [facility] Phone: [phone]

## 2020-10-21 NOTE — Clinical Note
Neptali Hurtado,    I followed up with Ingrid on Wednesday regarding his prostate biopsy results.  Unfortunately this did confirm high-grade prostate cancer, [place] 4+5 = 9.  He is scheduled for a nuclear medicine bone scan on 10/26/2020.  We discussed treatment options and will likely proceed with a robotic prostatectomy pending his bone scan.  Given his high-grade disease, he does have a very high likelihood of requiring multimodal therapy to provide the best long-term outcome.    Please let me know if you have any questions or concerns.    Thanks,   Dov Tellez M.D.  Cell: [phone]

## 2020-10-26 ENCOUNTER — HOSPITAL ENCOUNTER (OUTPATIENT)
Dept: NUCLEAR MEDICINE | Facility: CLINIC | Age: 60
Setting detail: NUCLEAR MEDICINE
End: 2020-10-26
Attending: UROLOGY
Payer: COMMERCIAL

## 2020-10-26 ENCOUNTER — PREP FOR PROCEDURE (OUTPATIENT)
Dept: UROLOGY | Facility: CLINIC | Age: 60
End: 2020-10-26

## 2020-10-26 DIAGNOSIS — C61 PROSTATE CANCER (H): Primary | ICD-10-CM

## 2020-10-26 DIAGNOSIS — C61 PROSTATE CANCER (H): ICD-10-CM

## 2020-10-26 PROCEDURE — 78306 BONE IMAGING WHOLE BODY: CPT | Mod: 26 | Performed by: RADIOLOGY

## 2020-10-26 PROCEDURE — A9503 TC99M MEDRONATE: HCPCS | Performed by: UROLOGY

## 2020-10-26 PROCEDURE — 78306 BONE IMAGING WHOLE BODY: CPT

## 2020-10-26 PROCEDURE — 343N000001 HC RX 343: Performed by: UROLOGY

## 2020-10-26 RX ORDER — TC 99M MEDRONATE 20 MG/10ML
20-30 INJECTION, POWDER, LYOPHILIZED, FOR SOLUTION INTRAVENOUS ONCE
Status: COMPLETED | OUTPATIENT
Start: 2020-10-26 | End: 2020-10-26

## 2020-10-26 RX ADMIN — TC 99M MEDRONATE 24.4 MCI.: 20 INJECTION, POWDER, LYOPHILIZED, FOR SOLUTION INTRAVENOUS at 09:00

## 2020-10-27 DIAGNOSIS — Z11.59 ENCOUNTER FOR SCREENING FOR OTHER VIRAL DISEASES: Primary | ICD-10-CM

## 2020-10-27 PROBLEM — C61 PROSTATE CANCER (H): Status: ACTIVE | Noted: 2020-10-27

## 2020-12-02 ENCOUNTER — OFFICE VISIT (OUTPATIENT)
Dept: UROLOGY | Facility: CLINIC | Age: 60
End: 2020-12-02
Payer: COMMERCIAL

## 2020-12-02 VITALS
DIASTOLIC BLOOD PRESSURE: 70 MMHG | HEART RATE: 75 BPM | BODY MASS INDEX: 27.64 KG/M2 | WEIGHT: 172 LBS | SYSTOLIC BLOOD PRESSURE: 118 MMHG | OXYGEN SATURATION: 100 % | HEIGHT: 66 IN

## 2020-12-02 DIAGNOSIS — C61 PROSTATE CANCER (H): Primary | ICD-10-CM

## 2020-12-02 PROCEDURE — 99214 OFFICE O/P EST MOD 30 MIN: CPT | Performed by: UROLOGY

## 2020-12-02 ASSESSMENT — MIFFLIN-ST. JEOR: SCORE: 1522.94

## 2020-12-02 ASSESSMENT — PAIN SCALES - GENERAL: PAINLEVEL: NO PAIN (0)

## 2020-12-02 NOTE — NURSING NOTE
Chief Complaint   Patient presents with     Prostate Cancer     Malignant neoplasm of prostate     Sara Alcantar

## 2020-12-02 NOTE — PROGRESS NOTES
"[facility]  CHIEF COMPLAINT   It was my pleasure to see Ingrid Villavicencio who is a 60 year old male for follow-up of prostate cancer .      HPI   Ingrid Villavicencio is a very pleasant 60 year old male    Initially seen 9/25/20:  \"Ingrid Villavicencio is a 60 year old male who is being seen for evaluation of Elevated PSA.  He had recent PSA in August 2020 2.8.  No clear records that I could find of a prior PSA.  He denies any voiding symptoms.  He denies any family history of prostate cancer.  He denies any hip or bony tenderness.\"    10/14/20:  MR- fusion biopsy    TODAY:  He is scheduled for surgery on 12/14/2020  He was recently seen at [facility] for second opinion and presents today with a few questions he wants to discuss    PHYSICAL EXAM  Patient is a 60 year old  male   Vitals: Blood pressure 118/70, pulse 75, height 1.676 m (5' 6\"), weight 78 kg (172 lb), SpO2 100 %.  Body mass index is 27.76 kg/m .  General Appearance Adult:   Alert, no acute distress, oriented  HENT: throat/mouth:normal, good dentition  Lungs: no respiratory distress, or pursed lip breathing  Heart: No obvious jugular venous distension present  Abdomen: non - distended  Musculoskeltal: extremities normal, no peripheral edema  Skin: no suspicious lesions or rashes  Neuro: Alert, oriented, speech and mentation normal  Psych: affect and mood normal  Gait: Normal   Rectal: ~30cc gland, firm nodule on the LEFT apex, asymmetric    Component PSA PSA Diag Urologic Phys   Latest Ref Rng & Units 0 - 4 ug/L 0.00 - 4.00 ng/mL   8/17/2020 22.80 (H)    9/25/2020  30.50 (H)       PATHOLOGY:  FINAL DIAGNOSIS:   A.  Prostate, left lateral base , biopsy   - Benign prostatic tissue. Negative for malignancy     B.  Prostate, left lateral mid, biopsy   - Prostatic adenocarcinoma, acinar type, Wolfgang's grade 3+ 3 = 6, grade   group is1, number of cores involved   is 1 of 1, total surface area involved is <5 %,perineural invasion is not   identified     C.  Prostate, left " lateral apex, biopsy   - Prostatic adenocarcinoma, acinar type, Wolgfang's grade 4 +4 = 8, grade   group is 4, number of cores involved   is 1 of 1, total surface area involved is 40 %,perineural invasion is not   identified     D.  Prostate, left base, biopsy   - Benign prostatic tissue. Negative for malignancy     E.  Prostate, left mid, biopsy   - Prostatic adenocarcinoma, acinar type, [place]'s grade 4 + 5 = 9, grade   group is 5, number of cores involved   is 1 of 1, total surface area involved is 75 %,perineural invasion is   identified. Percentage of grade 4 = 90%,   percentage of grade 5 = 10% (Please see comment)     F.  Prostate, left apex, biopsy   - Prostatic adenocarcinoma, acinar type, [place]'s grade 4 + 5 = 9, grade   group is 5, number of cores involved   is 1 of 1, total surface area involved is 90 %,perineural invasion is   identified. Percentage of grade 4 = 95%,   percentage of grade 5 = 5% (Please see comment)     G.  Prostate, right lateral base, biopsy   - Benign prostatic tissue. Negative for malignancy     H.  Prostate, right lateral mid, biopsy   - Benign prostatic tissue. Negative for malignancy     I.  Prostate, right lateral apex, biopsy   -Focal high grade prostatic intraepithelial neoplasia. Negative for   invasive malignancy.     J.  Prostate, right base, biopsy   - Benign prostatic tissue. Negative for malignancy     K.  Prostate, right mid, biopsy   - Benign prostatic tissue. Negative for malignan.cy     L.  Prostate, right apex, biopsy   - Focal high-grade prostatic intraepithelial neoplasia. Negative for   invasive malignancy.     M. Prostate, left [place] lesion x3, biopsy-   - Prostatic adenocarcinoma, acinar type, Wolfgang's grade 4 + 5 = 9, grade   group is 5, number of cores involved   is 3 of 3, total surface area involved is 95 %,perineural invasion is   identified. Percentage of grade 4 = 95%,   percentage of grade 5 = 5% (Please see comment)     IMAGING:  All pertinent  imaging reviewed:    All imaging studies reviewed by me.  I personally reviewed these imaging films.  A formal report from radiology will follow.    FINDINGS:  Size: 3.5 x 4.8 x 3.8 cm  Volume: 33.2 mL  Hemorrhage: Absent  Peripheral zone: Heterogeneous on T2-weighted images. Suspicious  lesions as detailed below.  Transition zone: Nonenlarged. Transition zone nodules which are  circumscribed or mostly encapsulated without diffusion restriction.   PI-RADS 2.  No highly suspicious nodules.     Lesion(s) in rank order of severity (highest score- to lowest score,  then by size)      Lesion 1:  Location: Left apex peripheral zone at the 5-6 o'clock position  relative to the urethra. Series 5 image 62.   Additional prostate regions involved: There is abutment of the  external urethral sphincter.  Size: 28 mm  T2 description: Moderate hypointense  T2 numerical assessment: 5  DWI description: Hypointense on ADC and hyperintense on high b-value  DWI  DWI numerical assessment: 5  DCE assessment: Positive    Prostate margin: Capsular abutment 6-15 mm with focal bulging. The  mass is also extending anteriorly and abutting the external urinary  sphincter (series 17 image 18) as well as extending superiorly is a  linear band invading the inferomedial bilateral seminal vesicles  (series 17 image 10).  Lesion overall PI-RADS category: 5     Neurovascular bundles: No neurovascular bundle involvement by  malignancy.  Seminal vesicles: Both seminal vesicles are involved by malignancy.  Lymph nodes: No lymph node involvement  Bones: No suspicious lesions  Other pelvic organs: No additional findings.                                                                   IMPRESSION:  1. Based on the most suspicious abnormality, this exam is  characterized as PIRADS 5 - Clinically significant cancer is highly  likely to be present.  The most suspicious abnormality is located at  the left apex peripheral zone at the 5-6 o'clock position  relative to  the urethra and there is capsular bulging indicating moderately high  suspicion of minimal extraprostatic extension. The mass is also  extending anteriorly and abutting the external urinary sphincter as  well as extending superiorly and invading the inferomedial seminal  vesicles.  2. No suspicious adenopathy or evidence of pelvic metastases.    ASSESSMENT and PLAN  60 year old man with PSA 30.50 and Wolfgang 4+5=9 prostate cancer with MRI showing PIRADs 5 lesion with high suspicion of extra-prostatic extension and possible SV invasion. No evidence of lymphadenopathy.    MSK Nomogram:  Cancer-specific survival after RALP:  99% (10 years) 98%(15 years)  Progression-free % after RALP:  14% (5 years)  8% (10 years)  Organ-confined disease:   4%  Extra-prostatic extension:   95%  Lymph node involvement:   65%  Seminal Vesicle invasion:   58%      With respect to radical prostatectomy, the pros and cons of open vs robotic-assisted laparoscopic prostatectomy were discussed. The complications of this procedure were reviewed with the patient and include (but not limited to) urinary incontinence, erectile dysfunction, infertility, anastomotic stricture, lymphocele, hemorrhage requiring transfusion, rectal, ureteral, or nerve injury, infection, cardiovascular, pulmonary, thromboembolic, and anesthetic complications.  For robotic prostatectomy, the additional complications of anastomotic urine leak and small bowel obstruction from adhesions was conveyed. The anticipated post-operative course was explained, including an anticipated 1 night hospital stay.  We discussed that given his extensive disease on the left side I would attempt, but am not optimistic that a safe nerve sparing can be completed on the left.  A right-sided nerve spare would be attempted.  We discussed the expected outcomes with regard to erectile status and urine control.    With surgery, we also discussed the potential advantage over radiation  therapy in that biochemical recurrence can be detected at a relatively earlier stage and that salvage radiotherapy is successful in controlling recurrent disease in a substantial proportion of patients.  I also conveyed that salvage radiotherapy was associated with a considerably more favorable morbidity profile compared to local salvage therapies for radiorecurent disease.     All questions were answered.    We again reviewed his MSK nomogram and that there is a high likelihood of the requirement for early salvage radiotherapy pending his PSA after surgery.    Plan:  -Proceed with surgery as scheduled    I spent over 25 minutes with the patient.  Over half this time was spent on counseling regarding the above.    Dov Tellez MD   Urology  [facility]  [facility] Phone: [phone]  [facility] Phone: [phone]

## 2020-12-09 ENCOUNTER — TELEPHONE (OUTPATIENT)
Dept: FAMILY MEDICINE | Facility: CLINIC | Age: 60
End: 2020-12-09

## 2020-12-09 ENCOUNTER — OFFICE VISIT (OUTPATIENT)
Dept: FAMILY MEDICINE | Facility: CLINIC | Age: 60
End: 2020-12-09
Payer: COMMERCIAL

## 2020-12-09 VITALS
HEART RATE: 69 BPM | OXYGEN SATURATION: 100 % | HEIGHT: 66 IN | RESPIRATION RATE: 16 BRPM | DIASTOLIC BLOOD PRESSURE: 80 MMHG | BODY MASS INDEX: 25.39 KG/M2 | WEIGHT: 158 LBS | TEMPERATURE: 98.3 F | SYSTOLIC BLOOD PRESSURE: 133 MMHG

## 2020-12-09 DIAGNOSIS — C61 PROSTATE CANCER (H): ICD-10-CM

## 2020-12-09 DIAGNOSIS — K21.9 GASTROESOPHAGEAL REFLUX DISEASE, UNSPECIFIED WHETHER ESOPHAGITIS PRESENT: ICD-10-CM

## 2020-12-09 DIAGNOSIS — Z01.818 PREOP GENERAL PHYSICAL EXAM: Primary | ICD-10-CM

## 2020-12-09 LAB
ERYTHROCYTE [DISTWIDTH] IN BLOOD BY AUTOMATED COUNT: 13 % (ref 10–15)
HCT VFR BLD AUTO: 43.1 % (ref 40–53)
HGB BLD-MCNC: 14.3 G/DL (ref 13.3–17.7)
MCH RBC QN AUTO: 29.4 PG (ref 26.5–33)
MCHC RBC AUTO-ENTMCNC: 33.2 G/DL (ref 31.5–36.5)
MCV RBC AUTO: 89 FL (ref 78–100)
PLATELET # BLD AUTO: 282 10E9/L (ref 150–450)
RBC # BLD AUTO: 4.87 10E12/L (ref 4.4–5.9)
WBC # BLD AUTO: 6.2 10E9/L (ref 4–11)

## 2020-12-09 PROCEDURE — 80048 BASIC METABOLIC PNL TOTAL CA: CPT | Performed by: FAMILY MEDICINE

## 2020-12-09 PROCEDURE — 85027 COMPLETE CBC AUTOMATED: CPT | Performed by: FAMILY MEDICINE

## 2020-12-09 PROCEDURE — 36415 COLL VENOUS BLD VENIPUNCTURE: CPT | Performed by: FAMILY MEDICINE

## 2020-12-09 PROCEDURE — 99214 OFFICE O/P EST MOD 30 MIN: CPT | Performed by: FAMILY MEDICINE

## 2020-12-09 ASSESSMENT — MIFFLIN-ST. JEOR: SCORE: 1459.43

## 2020-12-09 NOTE — PROGRESS NOTES
[facility]  [address]  [facility] [address]  Phone: [phone]  Primary Provider: Amando Hurtado  Pre-op Performing Provider: ANEUDY PRO    PREOPERATIVE EVALUATION:  Today's date: 12/9/2020    Sowmya Villavicencio is a 60 year old male who presents for a preoperative evaluation.    Surgical Information:  Surgery/Procedure: ROBOTIC ASSISTED LAPAROSCOPIC RADICAL PROSTATECTOMY WITH BILATERAL PELVIC LYMPH NODE DISSECTION - POSSIBLE OPEN   Surgery Location: [facility]   Surgeon: Dov Tellez MD  Surgery Date: 12/14/2020  Time of Surgery: TBD  Where patient plans to recover: At home with family  Fax number for surgical facility: Note does not need to be faxed, will be available electronically in Epic.    Type of Anesthesia Anticipated: to be determined    Subjective     HPI related to upcoming procedure: On 8/17/2020 lab results revealed elevated PSA level 22.8.  He was referred to urology and had a prostate biopsy consistent with adenocarcinoma.  Imaging studies revealed a PRADS 5 lesion with high suspicion of extraprostatic extension.  There was no suspicious adenopathy or evidence of pelvic metastasis.  Bone scan results showed no evidence of osseous metastatic disease.  He reports that he is feeling healthy and denies having any chronic medical problems.      MRI Prostate 10/1/20  IMPRESSION:  1. Based on the most suspicious abnormality, this exam is  characterized as PIRADS 5 - Clinically significant cancer is highly  likely to be present.  The most suspicious abnormality is located at  the left apex peripheral zone at the 5-6 o'clock position relative to  the urethra and there is capsular bulging indicating moderately high  suspicion of minimal extraprostatic extension. The mass is also  extending anteriorly and abutting the external urinary sphincter as  well as extending superiorly and invading the inferomedial seminal  vesicles.  2. No  suspicious adenopathy or evidence of pelvic metastases.    Preop Questions 12/9/2020   1. Have you ever had a heart attack or stroke? No   2. Have you ever had surgery on your heart or blood vessels, such as a stent placement, a coronary artery bypass, or surgery on an artery in your head, neck, heart, or legs? No   3. Do you have chest pain with activity? No   4. Do you have a history of  heart failure? No   5. Do you currently have a cold, bronchitis or symptoms of other infection? No   6. Do you have a cough, shortness of breath, or wheezing? No   7. Do you or anyone in your family have previous history of blood clots? No   8. Do you or does anyone in your family have a serious bleeding problem such as prolonged bleeding following surgeries or cuts? No   9. Have you ever had problems with anemia or been told to take iron pills? No   10. Have you had any abnormal blood loss such as black, tarry or bloody stools? No   11. Have you ever had a blood transfusion? No   12. Are you willing to have a blood transfusion if it is medically needed before, during, or after your surgery? Yes   13. Have you or any of your relatives ever had problems with anesthesia? No   14. Do you have sleep apnea, excessive snoring or daytime drowsiness? No   15. Do you have any artifical heart valves or other implanted medical devices like a pacemaker, defibrillator, or continuous glucose monitor? No   16. Do you have artificial joints? No   17. Are you allergic to latex? No       Health Care Directive:  Patient does not have a Health Care Directive or Living Will: Discussed advance care planning with patient; information given to patient to review.      Review of Systems  CONSTITUTIONAL: NEGATIVE for fever, chills, change in weight  INTEGUMENTARY/SKIN: NEGATIVE for worrisome rashes, moles or lesions  EYES: NEGATIVE for vision changes or irritation  ENT/MOUTH: NEGATIVE for ear, mouth and throat problems  RESP: NEGATIVE for significant cough  or SOB  CV: NEGATIVE for chest pain, palpitations or peripheral edema  GI: NEGATIVE for nausea, abdominal pain, heartburn, or change in bowel habits  : NEGATIVE for frequency, dysuria, or hematuria  MUSCULOSKELETAL: NEGATIVE for significant arthralgias or myalgia  NEURO: NEGATIVE for weakness, dizziness or paresthesias  ENDOCRINE: NEGATIVE for temperature intolerance, skin/hair changes  HEME: NEGATIVE for bleeding problems  PSYCHIATRIC: NEGATIVE for changes in mood or affect    Patient Active Problem List    Diagnosis Date Noted     Prostate cancer (H) 10/27/2020     Priority: Medium     Added automatically from request for surgery [id number]       Bilateral low back pain with right-sided sciatica 09/03/2015     Priority: Medium     Advanced directives, counseling/discussion 05/14/2014     Priority: Medium     Advance Care Planning:   ACP Review and Resources Provided:  Reviewed chart for advance care plan.  Ingrid Villavicencio has no plan or code status on file. Discussed available resources and provided with information. Confirmed code status reflects current choices pending further ACP discussions.  Confirmed/documented designated decision maker(s). See permanent comments section of demographics in clinical tab.   Added by Shelly Justin on 5/14/2014             GERD (gastroesophageal reflux disease) 09/25/2013     Priority: Medium     CARDIOVASCULAR SCREENING; LDL GOAL LESS THAN 160 03/07/2013     Priority: Medium     Erectile dysfunction 03/07/2013     Priority: Medium      Past Medical History:   Diagnosis Date     Shingles     in the eye     Past Surgical History:   Procedure Laterality Date     PROSTATE SURGERY       Current Outpatient Medications   Medication Sig Dispense Refill     cholecalciferol 25 MCG (1000 UT) TABS Take 1 tablet by mouth daily         Allergies   Allergen Reactions     Amoxicillin      Upset stomach/diarrhea        Social History     Tobacco Use     Smoking status: Never Smoker      "Smokeless tobacco: Never Used   Substance Use Topics     Alcohol use: No     Family History   Problem Relation Age of Onset     No Known Problems Mother      History   Drug Use No         Objective     /80   Pulse 69   Temp 98.3  F (36.8  C) (Tympanic)   Resp 16   Ht 1.676 m (5' 6\")   Wt 71.7 kg (158 lb)   SpO2 100%   BMI 25.50 kg/m      Physical Exam    GENERAL APPEARANCE: healthy, alert and no distress     EYES: EOMI,  PERRL     HENT: ear canals and TM's normal and nose and mouth without ulcers or lesions     NECK: no adenopathy, no asymmetry, masses, or scars and thyroid normal to palpation     RESP: lungs clear to auscultation - no rales, rhonchi or wheezes     CV: regular rates and rhythm, normal S1 S2, no S3 or S4 and no murmur, click or rub     ABDOMEN:  soft, nontender, no HSM or masses and bowel sounds normal     MS: extremities normal- no gross deformities noted, no evidence of inflammation in joints, FROM in all extremities.     SKIN: no suspicious lesions or rashes     NEURO: Normal strength and tone, sensory exam grossly normal, mentation intact and speech normal     PSYCH: mentation appears normal. and affect normal/bright     LYMPHATICS: No cervical adenopathy    Recent Labs   Lab Test 08/17/20  0842 04/10/19  1108    139   POTASSIUM 3.9 4.3   CR 0.90 0.94        Diagnostics:  Recent Results (from the past 48 hour(s))   Basic metabolic panel    Collection Time: 12/09/20  2:16 PM   Result Value Ref Range    Sodium 139 133 - 144 mmol/L    Potassium 3.7 3.4 - 5.3 mmol/L    Chloride 109 94 - 109 mmol/L    Carbon Dioxide 24 20 - 32 mmol/L    Anion Gap 6 3 - 14 mmol/L    Glucose 90 70 - 99 mg/dL    Urea Nitrogen 7 7 - 30 mg/dL    Creatinine 0.87 0.66 - 1.25 mg/dL    GFR Estimate >90 >60 mL/min/[1.73_m2]    GFR Estimate If Black >90 >60 mL/min/[1.73_m2]    Calcium 9.1 8.5 - 10.1 mg/dL   CBC with platelets    Collection Time: 12/09/20  2:16 PM   Result Value Ref Range    WBC 6.2 4.0 - 11.0 " 10e9/L    RBC Count 4.87 4.4 - 5.9 10e12/L    Hemoglobin 14.3 13.3 - 17.7 g/dL    Hematocrit 43.1 40.0 - 53.0 %    MCV 89 78 - 100 fl    MCH 29.4 26.5 - 33.0 pg    MCHC 33.2 31.5 - 36.5 g/dL    RDW 13.0 10.0 - 15.0 %    Platelet Count 282 150 - 450 10e9/L      No EKG required, no history of coronary heart disease, significant arrhythmia, peripheral arterial disease or other structural heart disease.    Revised Cardiac Risk Index (RCRI):  The patient has the following serious cardiovascular risks for perioperative complications:   - No serious cardiac risks = 0 points     RCRI Interpretation: 0 points: Class I (very low risk - 0.4% complication rate)       Assessment & Plan   The proposed surgical procedure is considered INTERMEDIATE risk.    Preop general physical exam  He is cleared to proceed with the robotic assisted laparoscopic radical prostatectomy with bilateral pelvic lymph node dissection is planned.  We are going to check a BMP and hemogram.  He does not need an EKG since he has no history of coronary artery disease, arrhythmia, peripheral vascular disease or other structural heart diseases.  COVID-19 testing has been ordered.    Prostate cancer (H)  He is cleared to proceed with the prostatectomy as scheduled.    Gastroesophageal reflux disease, unspecified whether esophagitis present  He denies having any concerns with GERD at this time.  He reports only needing Tums for a short time.  In the past.  He was prescribed pantoprazole, but never took it.    I personally reviewed the office visit notes going back to August, 2020 including the lab results and imaging results.  I also personally reviewed the most recent urology consultation note from 12/2/2020.    Risks and Recommendations:  The patient has the following additional risks and recommendations for perioperative complications:   - No identified additional risk factors other than previously addressed        RECOMMENDATION:  APPROVAL GIVEN to proceed  with proposed procedure, without further diagnostic evaluation.    Signed Electronically by: Dylan Pop DO    Copy of this evaluation report is provided to requesting physician.    Preop [facility] Preop Guidelines    Revised Cardiac Risk Index

## 2020-12-09 NOTE — TELEPHONE ENCOUNTER
----- Message from Dylan Pop,  sent at 12/9/2020  3:33 PM CST -----  Regarding: Pre-op COVID test  Please contact this patient and ask if he needs a Covid test prior to surgery on 12/15/2020.  I suspect he will, but we did not address this at his pre-op today.  I do not see that the test was ordered by his surgeon.  I can place the order if needed.  I have tried to call him couple times, but was not able to get through.  Thank you, DE

## 2020-12-10 DIAGNOSIS — Z11.59 ENCOUNTER FOR SCREENING FOR OTHER VIRAL DISEASES: ICD-10-CM

## 2020-12-10 LAB
ANION GAP SERPL CALCULATED.3IONS-SCNC: 6 MMOL/L (ref 3–14)
BUN SERPL-MCNC: 7 MG/DL (ref 7–30)
CALCIUM SERPL-MCNC: 9.1 MG/DL (ref 8.5–10.1)
CHLORIDE SERPL-SCNC: 109 MMOL/L (ref 94–109)
CO2 SERPL-SCNC: 24 MMOL/L (ref 20–32)
CREAT SERPL-MCNC: 0.87 MG/DL (ref 0.66–1.25)
GFR SERPL CREATININE-BSD FRML MDRD: >90 ML/MIN/{1.73_M2}
GLUCOSE SERPL-MCNC: 90 MG/DL (ref 70–99)
POTASSIUM SERPL-SCNC: 3.7 MMOL/L (ref 3.4–5.3)
SODIUM SERPL-SCNC: 139 MMOL/L (ref 133–144)

## 2020-12-10 PROCEDURE — U0003 INFECTIOUS AGENT DETECTION BY NUCLEIC ACID (DNA OR RNA); SEVERE ACUTE RESPIRATORY SYNDROME CORONAVIRUS 2 (SARS-COV-2) (CORONAVIRUS DISEASE [COVID-19]), AMPLIFIED PROBE TECHNIQUE, MAKING USE OF HIGH THROUGHPUT TECHNOLOGIES AS DESCRIBED BY CMS-2020-01-R: HCPCS | Performed by: UROLOGY

## 2020-12-10 NOTE — TELEPHONE ENCOUNTER
----- Message from Dylan Pop, DO sent at 12/9/2020  5:35 PM CST -----  ILIANA: I tried to call this patient again, but was unable to get through about Covid testing prior to surgery.  I went ahead and put the order in.  I expect that someone should be contacting him to schedule this.  I would still appreciate it if you could reach out to let him know.  Thank you! -DE

## 2020-12-11 DIAGNOSIS — C61 PROSTATE CANCER (H): Primary | ICD-10-CM

## 2020-12-11 LAB
SARS-COV-2 RNA SPEC QL NAA+PROBE: NOT DETECTED
SPECIMEN SOURCE: NORMAL

## 2020-12-11 RX ORDER — MULTIPLE VITAMINS W/ MINERALS TAB 9MG-400MCG
1 TAB ORAL DAILY
COMMUNITY

## 2020-12-11 NOTE — PROGRESS NOTES
PTA medications updated by Medication Scribe prior to surgery via phone call with patient      -LAST DOSES ENTERED BY NURSE-    Comments:    Medication history sources: Patient and H&P  Medication history source reliability: Good  Adherence assessment: N/A Not Observed    Significant changes made to the medication list:  None      Additional medication history information:   None        Prior to Admission medications    Medication Sig Last Dose Taking? Auth Provider   cholecalciferol 25 MCG (1000 UT) TABS Take 1 tablet by mouth daily 12/11/2020 at AM Yes Reported, Patient   multivitamin w/minerals (MULTI-VITAMIN) tablet Take 1 tablet by mouth daily ([employer] Immune formula) 12/11/2020 at AM Yes Reported, Patient

## 2020-12-13 ENCOUNTER — ANESTHESIA EVENT (OUTPATIENT)
Dept: SURGERY | Facility: CLINIC | Age: 60
End: 2020-12-13
Payer: COMMERCIAL

## 2020-12-14 ENCOUNTER — HOSPITAL ENCOUNTER (OUTPATIENT)
Facility: CLINIC | Age: 60
Discharge: HOME | End: 2020-12-15
Attending: UROLOGY | Admitting: UROLOGY
Payer: COMMERCIAL

## 2020-12-14 ENCOUNTER — ANESTHESIA (OUTPATIENT)
Dept: SURGERY | Facility: CLINIC | Age: 60
End: 2020-12-14
Payer: COMMERCIAL

## 2020-12-14 DIAGNOSIS — C61 PROSTATE CANCER (H): ICD-10-CM

## 2020-12-14 DIAGNOSIS — C61 PROSTATE CANCER (H): Primary | ICD-10-CM

## 2020-12-14 LAB
ABO + RH BLD: NORMAL
ABO + RH BLD: NORMAL
ANION GAP SERPL CALCULATED.3IONS-SCNC: 8 MMOL/L (ref 3–14)
BLD GP AB SCN SERPL QL: NORMAL
BLOOD BANK CMNT PATIENT-IMP: NORMAL
BUN SERPL-MCNC: 11 MG/DL (ref 7–30)
CALCIUM SERPL-MCNC: 8.8 MG/DL (ref 8.5–10.1)
CHLORIDE SERPL-SCNC: 106 MMOL/L (ref 94–109)
CO2 SERPL-SCNC: 25 MMOL/L (ref 20–32)
CREAT SERPL-MCNC: 0.84 MG/DL (ref 0.66–1.25)
ERYTHROCYTE [DISTWIDTH] IN BLOOD BY AUTOMATED COUNT: 12.7 % (ref 10–15)
GFR SERPL CREATININE-BSD FRML MDRD: >90 ML/MIN/{1.73_M2}
GLUCOSE SERPL-MCNC: 182 MG/DL (ref 70–99)
HCT VFR BLD AUTO: 41.2 % (ref 40–53)
HGB BLD-MCNC: 13.4 G/DL (ref 13.3–17.7)
LACTATE BLD-SCNC: 3 MMOL/L (ref 0.7–2)
MCH RBC QN AUTO: 29.3 PG (ref 26.5–33)
MCHC RBC AUTO-ENTMCNC: 32.5 G/DL (ref 31.5–36.5)
MCV RBC AUTO: 90 FL (ref 78–100)
PLATELET # BLD AUTO: 297 10E9/L (ref 150–450)
POTASSIUM SERPL-SCNC: 3.4 MMOL/L (ref 3.4–5.3)
RBC # BLD AUTO: 4.57 10E12/L (ref 4.4–5.9)
SODIUM SERPL-SCNC: 139 MMOL/L (ref 133–144)
SPECIMEN EXP DATE BLD: NORMAL
WBC # BLD AUTO: 23.5 10E9/L (ref 4–11)

## 2020-12-14 PROCEDURE — 83605 ASSAY OF LACTIC ACID: CPT | Performed by: UROLOGY

## 2020-12-14 PROCEDURE — 250N000009 HC RX 250: Performed by: UROLOGY

## 2020-12-14 PROCEDURE — 88309 TISSUE EXAM BY PATHOLOGIST: CPT | Mod: 26 | Performed by: PATHOLOGY

## 2020-12-14 PROCEDURE — 370N000001 HC ANESTHESIA TECHNICAL FEE, 1ST 30 MIN: Performed by: UROLOGY

## 2020-12-14 PROCEDURE — 38571 LAPAROSCOPY LYMPHADENECTOMY: CPT | Mod: 51 | Performed by: UROLOGY

## 2020-12-14 PROCEDURE — 250N000009 HC RX 250: Performed by: STUDENT IN AN ORGANIZED HEALTH CARE EDUCATION/TRAINING PROGRAM

## 2020-12-14 PROCEDURE — 250N000013 HC RX MED GY IP 250 OP 250 PS 637: Performed by: STUDENT IN AN ORGANIZED HEALTH CARE EDUCATION/TRAINING PROGRAM

## 2020-12-14 PROCEDURE — 250N000011 HC RX IP 250 OP 636: Performed by: ANESTHESIOLOGY

## 2020-12-14 PROCEDURE — 80048 BASIC METABOLIC PNL TOTAL CA: CPT | Performed by: STUDENT IN AN ORGANIZED HEALTH CARE EDUCATION/TRAINING PROGRAM

## 2020-12-14 PROCEDURE — 250N000009 HC RX 250: Performed by: NURSE ANESTHETIST, CERTIFIED REGISTERED

## 2020-12-14 PROCEDURE — 360N000069 HC SURGERY LEVEL 8 EA 15 ADDTL MIN: Performed by: UROLOGY

## 2020-12-14 PROCEDURE — 250N000009 HC RX 250

## 2020-12-14 PROCEDURE — 250N000003 HC SEVOFLURANE, EA 15 MIN: Performed by: UROLOGY

## 2020-12-14 PROCEDURE — 360N000068 HC SURGERY LEVEL 8 1ST 30 MIN: Performed by: UROLOGY

## 2020-12-14 PROCEDURE — 86850 RBC ANTIBODY SCREEN: CPT | Performed by: ANESTHESIOLOGY

## 2020-12-14 PROCEDURE — 258N000003 HC RX IP 258 OP 636: Performed by: ANESTHESIOLOGY

## 2020-12-14 PROCEDURE — 86900 BLOOD TYPING SEROLOGIC ABO: CPT | Performed by: ANESTHESIOLOGY

## 2020-12-14 PROCEDURE — 36415 COLL VENOUS BLD VENIPUNCTURE: CPT | Performed by: UROLOGY

## 2020-12-14 PROCEDURE — 86901 BLOOD TYPING SEROLOGIC RH(D): CPT | Performed by: ANESTHESIOLOGY

## 2020-12-14 PROCEDURE — 370N000002 HC ANESTHESIA TECHNICAL FEE, EACH ADDTL 15 MIN: Performed by: UROLOGY

## 2020-12-14 PROCEDURE — 250N000011 HC RX IP 250 OP 636

## 2020-12-14 PROCEDURE — 250N000011 HC RX IP 250 OP 636: Performed by: NURSE ANESTHETIST, CERTIFIED REGISTERED

## 2020-12-14 PROCEDURE — 85027 COMPLETE CBC AUTOMATED: CPT | Performed by: STUDENT IN AN ORGANIZED HEALTH CARE EDUCATION/TRAINING PROGRAM

## 2020-12-14 PROCEDURE — 258N000003 HC RX IP 258 OP 636: Performed by: STUDENT IN AN ORGANIZED HEALTH CARE EDUCATION/TRAINING PROGRAM

## 2020-12-14 PROCEDURE — 999N000139 HC STATISTIC PRE-PROCEDURE ASSESSMENT II: Performed by: UROLOGY

## 2020-12-14 PROCEDURE — 88309 TISSUE EXAM BY PATHOLOGIST: CPT | Mod: TC | Performed by: UROLOGY

## 2020-12-14 PROCEDURE — 89259 CRYOPRESERVATION SPERM: CPT

## 2020-12-14 PROCEDURE — 250N000011 HC RX IP 250 OP 636: Performed by: STUDENT IN AN ORGANIZED HEALTH CARE EDUCATION/TRAINING PROGRAM

## 2020-12-14 PROCEDURE — 250N000011 HC RX IP 250 OP 636: Performed by: UROLOGY

## 2020-12-14 PROCEDURE — 55866 LAPS SURG PRST8ECT RPBIC RAD: CPT | Performed by: UROLOGY

## 2020-12-14 PROCEDURE — 96372 THER/PROPH/DIAG INJ SC/IM: CPT | Performed by: STUDENT IN AN ORGANIZED HEALTH CARE EDUCATION/TRAINING PROGRAM

## 2020-12-14 PROCEDURE — 88307 TISSUE EXAM BY PATHOLOGIST: CPT | Mod: TC | Performed by: UROLOGY

## 2020-12-14 PROCEDURE — 258N000001 HC RX 258: Performed by: UROLOGY

## 2020-12-14 PROCEDURE — 272N000001 HC OR GENERAL SUPPLY STERILE: Performed by: UROLOGY

## 2020-12-14 PROCEDURE — 761N000001 HC RECOVERY PHASE 1 LEVEL 1 FIRST HR: Performed by: UROLOGY

## 2020-12-14 PROCEDURE — 36415 COLL VENOUS BLD VENIPUNCTURE: CPT | Performed by: ANESTHESIOLOGY

## 2020-12-14 PROCEDURE — 88307 TISSUE EXAM BY PATHOLOGIST: CPT | Mod: 26 | Performed by: PATHOLOGY

## 2020-12-14 PROCEDURE — 36415 COLL VENOUS BLD VENIPUNCTURE: CPT | Performed by: STUDENT IN AN ORGANIZED HEALTH CARE EDUCATION/TRAINING PROGRAM

## 2020-12-14 PROCEDURE — 761N000002 HC RECOVERY PHASE 1 LEVEL 1 EA ADDTL HR: Performed by: UROLOGY

## 2020-12-14 RX ORDER — PROCHLORPERAZINE MALEATE 10 MG
10 TABLET ORAL EVERY 6 HOURS PRN
Status: DISCONTINUED | OUTPATIENT
Start: 2020-12-14 | End: 2020-12-15 | Stop reason: HOSPADM

## 2020-12-14 RX ORDER — ONDANSETRON 2 MG/ML
4 INJECTION INTRAMUSCULAR; INTRAVENOUS EVERY 30 MIN PRN
Status: DISCONTINUED | OUTPATIENT
Start: 2020-12-14 | End: 2020-12-14 | Stop reason: HOSPADM

## 2020-12-14 RX ORDER — KETOROLAC TROMETHAMINE 15 MG/ML
15 INJECTION, SOLUTION INTRAMUSCULAR; INTRAVENOUS EVERY 6 HOURS
Status: DISCONTINUED | OUTPATIENT
Start: 2020-12-14 | End: 2020-12-15 | Stop reason: HOSPADM

## 2020-12-14 RX ORDER — MEPERIDINE HYDROCHLORIDE 25 MG/ML
12.5 INJECTION INTRAMUSCULAR; INTRAVENOUS; SUBCUTANEOUS EVERY 5 MIN PRN
Status: DISCONTINUED | OUTPATIENT
Start: 2020-12-14 | End: 2020-12-14 | Stop reason: HOSPADM

## 2020-12-14 RX ORDER — NALOXONE HYDROCHLORIDE 0.4 MG/ML
0.2 INJECTION, SOLUTION INTRAMUSCULAR; INTRAVENOUS; SUBCUTANEOUS
Status: DISCONTINUED | OUTPATIENT
Start: 2020-12-14 | End: 2020-12-15 | Stop reason: HOSPADM

## 2020-12-14 RX ORDER — LIDOCAINE HYDROCHLORIDE 20 MG/ML
INJECTION, SOLUTION INFILTRATION; PERINEURAL PRN
Status: DISCONTINUED | OUTPATIENT
Start: 2020-12-14 | End: 2020-12-14

## 2020-12-14 RX ORDER — PROPOFOL 10 MG/ML
INJECTION, EMULSION INTRAVENOUS PRN
Status: DISCONTINUED | OUTPATIENT
Start: 2020-12-14 | End: 2020-12-14

## 2020-12-14 RX ORDER — ONDANSETRON 2 MG/ML
4 INJECTION INTRAMUSCULAR; INTRAVENOUS EVERY 6 HOURS PRN
Status: DISCONTINUED | OUTPATIENT
Start: 2020-12-14 | End: 2020-12-15 | Stop reason: HOSPADM

## 2020-12-14 RX ORDER — HEPARIN SODIUM 5000 [USP'U]/.5ML
5000 INJECTION, SOLUTION INTRAVENOUS; SUBCUTANEOUS
Status: COMPLETED | OUTPATIENT
Start: 2020-12-14 | End: 2020-12-14

## 2020-12-14 RX ORDER — POLYETHYLENE GLYCOL 3350 17 G/17G
17 POWDER, FOR SOLUTION ORAL DAILY
Status: DISCONTINUED | OUTPATIENT
Start: 2020-12-14 | End: 2020-12-15 | Stop reason: HOSPADM

## 2020-12-14 RX ORDER — OXYCODONE HYDROCHLORIDE 5 MG/1
5-10 TABLET ORAL EVERY 4 HOURS PRN
Status: DISCONTINUED | OUTPATIENT
Start: 2020-12-14 | End: 2020-12-15 | Stop reason: HOSPADM

## 2020-12-14 RX ORDER — ASPIRIN 81 MG
100 TABLET, DELAYED RELEASE (ENTERIC COATED) ORAL DAILY
Qty: 60 TABLET | Refills: 1 | Status: SHIPPED | OUTPATIENT
Start: 2020-12-14 | End: 2021-04-21

## 2020-12-14 RX ORDER — SODIUM CHLORIDE 9 MG/ML
INJECTION, SOLUTION INTRAVENOUS CONTINUOUS
Status: DISCONTINUED | OUTPATIENT
Start: 2020-12-14 | End: 2020-12-15 | Stop reason: HOSPADM

## 2020-12-14 RX ORDER — CEFAZOLIN SODIUM 1 G/3ML
1 INJECTION, POWDER, FOR SOLUTION INTRAMUSCULAR; INTRAVENOUS SEE ADMIN INSTRUCTIONS
Status: DISCONTINUED | OUTPATIENT
Start: 2020-12-14 | End: 2020-12-14 | Stop reason: HOSPADM

## 2020-12-14 RX ORDER — NALOXONE HYDROCHLORIDE 0.4 MG/ML
0.4 INJECTION, SOLUTION INTRAMUSCULAR; INTRAVENOUS; SUBCUTANEOUS
Status: DISCONTINUED | OUTPATIENT
Start: 2020-12-14 | End: 2020-12-15 | Stop reason: HOSPADM

## 2020-12-14 RX ORDER — LABETALOL HYDROCHLORIDE 5 MG/ML
10 INJECTION, SOLUTION INTRAVENOUS
Status: DISCONTINUED | OUTPATIENT
Start: 2020-12-14 | End: 2020-12-14 | Stop reason: HOSPADM

## 2020-12-14 RX ORDER — CALCIUM CARBONATE 500 MG/1
500 TABLET, CHEWABLE ORAL 4 TIMES DAILY PRN
Status: DISCONTINUED | OUTPATIENT
Start: 2020-12-14 | End: 2020-12-15 | Stop reason: HOSPADM

## 2020-12-14 RX ORDER — DEXAMETHASONE SODIUM PHOSPHATE 4 MG/ML
INJECTION, SOLUTION INTRA-ARTICULAR; INTRALESIONAL; INTRAMUSCULAR; INTRAVENOUS; SOFT TISSUE PRN
Status: DISCONTINUED | OUTPATIENT
Start: 2020-12-14 | End: 2020-12-14

## 2020-12-14 RX ORDER — HYDROXYZINE HYDROCHLORIDE 25 MG/1
25 TABLET, FILM COATED ORAL EVERY 6 HOURS PRN
Status: DISCONTINUED | OUTPATIENT
Start: 2020-12-14 | End: 2020-12-15 | Stop reason: HOSPADM

## 2020-12-14 RX ORDER — HEPARIN SODIUM 5000 [USP'U]/.5ML
5000 INJECTION, SOLUTION INTRAVENOUS; SUBCUTANEOUS EVERY 8 HOURS
Status: DISCONTINUED | OUTPATIENT
Start: 2020-12-14 | End: 2020-12-15 | Stop reason: HOSPADM

## 2020-12-14 RX ORDER — GLYCOPYRROLATE 0.2 MG/ML
INJECTION, SOLUTION INTRAMUSCULAR; INTRAVENOUS PRN
Status: DISCONTINUED | OUTPATIENT
Start: 2020-12-14 | End: 2020-12-14

## 2020-12-14 RX ORDER — ALBUTEROL SULFATE 0.83 MG/ML
2.5 SOLUTION RESPIRATORY (INHALATION) EVERY 4 HOURS PRN
Status: DISCONTINUED | OUTPATIENT
Start: 2020-12-14 | End: 2020-12-14 | Stop reason: HOSPADM

## 2020-12-14 RX ORDER — AMOXICILLIN 250 MG
1 CAPSULE ORAL AT BEDTIME
Status: DISCONTINUED | OUTPATIENT
Start: 2020-12-14 | End: 2020-12-15 | Stop reason: HOSPADM

## 2020-12-14 RX ORDER — FENTANYL CITRATE 50 UG/ML
INJECTION, SOLUTION INTRAMUSCULAR; INTRAVENOUS PRN
Status: DISCONTINUED | OUTPATIENT
Start: 2020-12-14 | End: 2020-12-14

## 2020-12-14 RX ORDER — SODIUM CHLORIDE, SODIUM LACTATE, POTASSIUM CHLORIDE, CALCIUM CHLORIDE 600; 310; 30; 20 MG/100ML; MG/100ML; MG/100ML; MG/100ML
INJECTION, SOLUTION INTRAVENOUS CONTINUOUS
Status: DISCONTINUED | OUTPATIENT
Start: 2020-12-14 | End: 2020-12-14 | Stop reason: HOSPADM

## 2020-12-14 RX ORDER — HYDROMORPHONE HYDROCHLORIDE 1 MG/ML
.2-.3 INJECTION, SOLUTION INTRAMUSCULAR; INTRAVENOUS; SUBCUTANEOUS
Status: DISCONTINUED | OUTPATIENT
Start: 2020-12-14 | End: 2020-12-15 | Stop reason: HOSPADM

## 2020-12-14 RX ORDER — HYDROMORPHONE HYDROCHLORIDE 1 MG/ML
.3-.5 INJECTION, SOLUTION INTRAMUSCULAR; INTRAVENOUS; SUBCUTANEOUS EVERY 5 MIN PRN
Status: DISCONTINUED | OUTPATIENT
Start: 2020-12-14 | End: 2020-12-14 | Stop reason: HOSPADM

## 2020-12-14 RX ORDER — ONDANSETRON 2 MG/ML
INJECTION INTRAMUSCULAR; INTRAVENOUS PRN
Status: DISCONTINUED | OUTPATIENT
Start: 2020-12-14 | End: 2020-12-14

## 2020-12-14 RX ORDER — OXYBUTYNIN CHLORIDE 5 MG/1
5 TABLET, EXTENDED RELEASE ORAL DAILY
Qty: 14 TABLET | Refills: 0 | Status: SHIPPED | OUTPATIENT
Start: 2020-12-14 | End: 2020-12-28

## 2020-12-14 RX ORDER — FAMOTIDINE 20 MG/1
20 TABLET, FILM COATED ORAL 2 TIMES DAILY
Status: DISCONTINUED | OUTPATIENT
Start: 2020-12-14 | End: 2020-12-15 | Stop reason: HOSPADM

## 2020-12-14 RX ORDER — FENTANYL CITRATE 50 UG/ML
25-50 INJECTION, SOLUTION INTRAMUSCULAR; INTRAVENOUS
Status: DISCONTINUED | OUTPATIENT
Start: 2020-12-14 | End: 2020-12-14 | Stop reason: HOSPADM

## 2020-12-14 RX ORDER — HYDRALAZINE HYDROCHLORIDE 20 MG/ML
2.5-5 INJECTION INTRAMUSCULAR; INTRAVENOUS EVERY 10 MIN PRN
Status: DISCONTINUED | OUTPATIENT
Start: 2020-12-14 | End: 2020-12-14 | Stop reason: HOSPADM

## 2020-12-14 RX ORDER — CIPROFLOXACIN 500 MG/1
500 TABLET, FILM COATED ORAL 2 TIMES DAILY
Qty: 6 TABLET | Refills: 0 | Status: SHIPPED | OUTPATIENT
Start: 2020-12-14 | End: 2020-12-17

## 2020-12-14 RX ORDER — PROPOFOL 10 MG/ML
INJECTION, EMULSION INTRAVENOUS CONTINUOUS PRN
Status: DISCONTINUED | OUTPATIENT
Start: 2020-12-14 | End: 2020-12-14

## 2020-12-14 RX ORDER — ONDANSETRON 4 MG/1
4 TABLET, ORALLY DISINTEGRATING ORAL EVERY 30 MIN PRN
Status: DISCONTINUED | OUTPATIENT
Start: 2020-12-14 | End: 2020-12-14 | Stop reason: HOSPADM

## 2020-12-14 RX ORDER — OXYCODONE HYDROCHLORIDE 5 MG/1
5 TABLET ORAL EVERY 6 HOURS PRN
Qty: 9 TABLET | Refills: 0 | Status: SHIPPED | OUTPATIENT
Start: 2020-12-14 | End: 2021-04-21

## 2020-12-14 RX ORDER — BUPIVACAINE HYDROCHLORIDE 2.5 MG/ML
INJECTION, SOLUTION INFILTRATION; PERINEURAL PRN
Status: DISCONTINUED | OUTPATIENT
Start: 2020-12-14 | End: 2020-12-14 | Stop reason: HOSPADM

## 2020-12-14 RX ORDER — MAGNESIUM HYDROXIDE 1200 MG/15ML
LIQUID ORAL PRN
Status: DISCONTINUED | OUTPATIENT
Start: 2020-12-14 | End: 2020-12-14 | Stop reason: HOSPADM

## 2020-12-14 RX ORDER — ACETAMINOPHEN 325 MG/1
650 TABLET ORAL EVERY 4 HOURS
Status: DISCONTINUED | OUTPATIENT
Start: 2020-12-14 | End: 2020-12-15 | Stop reason: HOSPADM

## 2020-12-14 RX ORDER — CEFAZOLIN SODIUM 2 G/100ML
2 INJECTION, SOLUTION INTRAVENOUS
Status: COMPLETED | OUTPATIENT
Start: 2020-12-14 | End: 2020-12-14

## 2020-12-14 RX ORDER — ONDANSETRON 4 MG/1
4 TABLET, ORALLY DISINTEGRATING ORAL EVERY 6 HOURS PRN
Status: DISCONTINUED | OUTPATIENT
Start: 2020-12-14 | End: 2020-12-15 | Stop reason: HOSPADM

## 2020-12-14 RX ORDER — TOLTERODINE 4 MG/1
4 CAPSULE, EXTENDED RELEASE ORAL DAILY
Status: DISCONTINUED | OUTPATIENT
Start: 2020-12-14 | End: 2020-12-15 | Stop reason: HOSPADM

## 2020-12-14 RX ORDER — LIDOCAINE 40 MG/G
CREAM TOPICAL
Status: DISCONTINUED | OUTPATIENT
Start: 2020-12-14 | End: 2020-12-15 | Stop reason: HOSPADM

## 2020-12-14 RX ORDER — NEOSTIGMINE METHYLSULFATE 1 MG/ML
VIAL (ML) INJECTION PRN
Status: DISCONTINUED | OUTPATIENT
Start: 2020-12-14 | End: 2020-12-14

## 2020-12-14 RX ADMIN — CEFAZOLIN SODIUM 1 G: 2 INJECTION, SOLUTION INTRAVENOUS at 15:08

## 2020-12-14 RX ADMIN — HYDROMORPHONE HYDROCHLORIDE 0.2 MG: 1 INJECTION, SOLUTION INTRAMUSCULAR; INTRAVENOUS; SUBCUTANEOUS at 22:01

## 2020-12-14 RX ADMIN — TOLTERODINE TARTRATE 4 MG: 4 CAPSULE, EXTENDED RELEASE ORAL at 19:21

## 2020-12-14 RX ADMIN — DOCUSATE SODIUM 50 MG AND SENNOSIDES 8.6 MG 1 TABLET: 8.6; 5 TABLET, FILM COATED ORAL at 22:00

## 2020-12-14 RX ADMIN — NEOSTIGMINE METHYLSULFATE 3 MG: 1 INJECTION, SOLUTION INTRAVENOUS at 16:13

## 2020-12-14 RX ADMIN — KETOROLAC TROMETHAMINE 15 MG: 15 INJECTION, SOLUTION INTRAMUSCULAR; INTRAVENOUS at 19:21

## 2020-12-14 RX ADMIN — DEXAMETHASONE SODIUM PHOSPHATE 4 MG: 4 INJECTION, SOLUTION INTRA-ARTICULAR; INTRALESIONAL; INTRAMUSCULAR; INTRAVENOUS; SOFT TISSUE at 13:28

## 2020-12-14 RX ADMIN — FENTANYL CITRATE 100 MCG: 50 INJECTION, SOLUTION INTRAMUSCULAR; INTRAVENOUS at 13:07

## 2020-12-14 RX ADMIN — SODIUM CHLORIDE, POTASSIUM CHLORIDE, SODIUM LACTATE AND CALCIUM CHLORIDE: 600; 310; 30; 20 INJECTION, SOLUTION INTRAVENOUS at 12:28

## 2020-12-14 RX ADMIN — SODIUM CHLORIDE: 9 INJECTION, SOLUTION INTRAVENOUS at 19:21

## 2020-12-14 RX ADMIN — CEFAZOLIN SODIUM 2 G: 2 INJECTION, SOLUTION INTRAVENOUS at 13:12

## 2020-12-14 RX ADMIN — ROCURONIUM BROMIDE 10 MG: 10 INJECTION INTRAVENOUS at 13:34

## 2020-12-14 RX ADMIN — HYDROMORPHONE HYDROCHLORIDE 0.5 MG: 1 INJECTION, SOLUTION INTRAMUSCULAR; INTRAVENOUS; SUBCUTANEOUS at 15:35

## 2020-12-14 RX ADMIN — PROPOFOL 160 MG: 10 INJECTION, EMULSION INTRAVENOUS at 13:07

## 2020-12-14 RX ADMIN — HEPARIN SODIUM 5000 UNITS: 5000 INJECTION, SOLUTION INTRAVENOUS; SUBCUTANEOUS at 19:21

## 2020-12-14 RX ADMIN — ROCURONIUM BROMIDE 10 MG: 10 INJECTION INTRAVENOUS at 15:17

## 2020-12-14 RX ADMIN — LIDOCAINE HYDROCHLORIDE 100 MG: 20 INJECTION, SOLUTION INFILTRATION; PERINEURAL at 13:07

## 2020-12-14 RX ADMIN — ACETAMINOPHEN 650 MG: 325 TABLET, FILM COATED ORAL at 19:21

## 2020-12-14 RX ADMIN — HYDROMORPHONE HYDROCHLORIDE 0.5 MG: 1 INJECTION, SOLUTION INTRAMUSCULAR; INTRAVENOUS; SUBCUTANEOUS at 17:10

## 2020-12-14 RX ADMIN — PROPOFOL 30 MCG/KG/MIN: 10 INJECTION, EMULSION INTRAVENOUS at 13:15

## 2020-12-14 RX ADMIN — HEPARIN SODIUM 5000 UNITS: 10000 INJECTION, SOLUTION INTRAVENOUS; SUBCUTANEOUS at 13:24

## 2020-12-14 RX ADMIN — FAMOTIDINE 20 MG: 10 INJECTION, SOLUTION INTRAVENOUS at 22:00

## 2020-12-14 RX ADMIN — GLYCOPYRROLATE 0.4 MG: 0.2 INJECTION, SOLUTION INTRAMUSCULAR; INTRAVENOUS at 16:13

## 2020-12-14 RX ADMIN — MIDAZOLAM 2 MG: 1 INJECTION INTRAMUSCULAR; INTRAVENOUS at 13:01

## 2020-12-14 RX ADMIN — HYDROMORPHONE HYDROCHLORIDE 0.5 MG: 1 INJECTION, SOLUTION INTRAMUSCULAR; INTRAVENOUS; SUBCUTANEOUS at 17:36

## 2020-12-14 RX ADMIN — POLYETHYLENE GLYCOL 3350 17 G: 17 POWDER, FOR SOLUTION ORAL at 19:21

## 2020-12-14 RX ADMIN — ROCURONIUM BROMIDE 50 MG: 10 INJECTION INTRAVENOUS at 13:07

## 2020-12-14 RX ADMIN — ROCURONIUM BROMIDE 10 MG: 10 INJECTION INTRAVENOUS at 14:30

## 2020-12-14 RX ADMIN — SODIUM CHLORIDE, POTASSIUM CHLORIDE, SODIUM LACTATE AND CALCIUM CHLORIDE: 600; 310; 30; 20 INJECTION, SOLUTION INTRAVENOUS at 16:22

## 2020-12-14 RX ADMIN — SODIUM CHLORIDE, POTASSIUM CHLORIDE, SODIUM LACTATE AND CALCIUM CHLORIDE: 600; 310; 30; 20 INJECTION, SOLUTION INTRAVENOUS at 17:59

## 2020-12-14 RX ADMIN — ONDANSETRON 4 MG: 2 INJECTION INTRAMUSCULAR; INTRAVENOUS at 16:12

## 2020-12-14 NOTE — DISCHARGE INSTRUCTIONS
POSTOPERATIVE INSTRUCTIONS    Diagnosis-------------------------------   Prostate cancer     Procedure-------------------------------  Procedure(s) (LRB):  ROBOTIC ASSISTED LAPAROSCOPIC RADICAL PROSTATECTOMY WITH BILATERAL PELVIC LYMPH NODE DISSECTION (Bilateral)      Findings--------------------------------  Prostate and SVs removed en bloc. Bilateral pelvic lymph nodes removed.    Home-going instructions-----------------         Activity Limitation:     - No driving or operating heavy machinery while on narcotic pain medication.   - No strenuous exercise for 6 weeks.   - No lifting, pushing, pulling more than 10 pounds for 6 weeks. Take care when pushing with your arms to stand up.   - Do not strain your belly area. When you bend, sit up or twice, you could strain the area around your incision.   - Do not strain with bowel movements.   - Do not drive until you can press the brake pedal quickly and fully without pain.   - Do not operate a motor vehicle while taking narcotic pain medications    FOLLOW THESE INSTRUCTIONS AS INDICATED BELOW:  - Observe operative area for signs of excessive bleeding.  - You may shower.  - Increase fluid intake to promote clear urine.  - Resume usual diet as tolerated    What to expect while recovering----------- TUBES AND DRAINS:  1) Faye Catheter: You are going home with a Faye catheter which will remain in place until your follow-up appointment. Your nurse or  will provide written catheter care instructions for you to take home.   - Protect the catheter and treat it like an extension of your body - keep it secured to your leg and do not let it get caught, snagged, or tugged.   - Should the catheter somehow come out of position, do not let anyone but a urologist who is aware of your recent surgery try to replace a catheter. Best yet, contact our urology office right away with any concerns.   - Apply vaseline twice daily to the tip of the penis/catheter insertion point to  "keep the area lubricated and more comfortable.    What to expect while recovering----------- WOUND CARE:  - You may shower and get incisions wet starting 48 hrs after surgery.  - Do not scrub incisions or submerge wounds (aka, bath, pool, hot tub, etc.) for 2 weeks or until wounds have healed and catheter is removed.   - Remove wound dressing 48 hours after surgery if already not removed.   - If purple dermabond glue was used, avoid applying any lotions or ointments.   - Leave incision open to air. Cover with gauze only if needed for comfort or to protect clothing from drainage.    Discharge Medications/instructions:   1) PAIN: Oxycodone is a narcotic medication that has been prescribed for pain. Narcotics will cause sleepiness and constipation, therefore it is best to stop or reduce them as soon as you can and switch to using acetaminophen (Tylenol) and/or ibuprofen (Advil/Motrin), taken as directed on the packaging. Keep in mind that certain narcotics can contain acetaminophen, also called \"APAP\" on prescription bottles. Do not take more than 4,000mg of Tylenol (acetaminophen/ APAP) from all sources in any 24 hour period since this can cause liver damage. Never drive, operate machinery or drink alcoholic beverages while you are taking narcotic pain medications.     2) CONSTIPATION: Pericolace (senna/docusate sodium) can be taken twice daily for prevention of constipation since surgery, pain medications and bladder spasm medications can all make you constipated. Please reduce or stop pericolace if you develop loose stools. Other over the counter solutions such as prune juice, miralax, fiber products, senna, and dulcolax can also be used. If you are taking the pericolace but still have not had a bowel movement in 3 days, start over-the-counter Milk of Magnesia taken twice daily until you have a nice bowel movement. Call the office with any concerns.     3) ANTIBIOTICS - Ciprofloxacin 500mg should be taken started " the day prior to your your followup appointment to remove your Fuentes catheter and continued for 3 days.    4) Ditropan: Take daily to decrease catheter discomfort. Stop taking one day before your urology clinic visit      Questions/concerns------------------------  [facility]: [phone]  [facility]: [phone]    Future appointments  You will be contacted to arrange follow up with Dr. Tellez for fuentes removal and pathology review next week.     Dov Tellez MD

## 2020-12-14 NOTE — ANESTHESIA PREPROCEDURE EVALUATION
Anesthesia Pre-Procedure Evaluation    Patient: Sowmya Villavicencio   MRN: [id number] : 1960          Preoperative Diagnosis: Prostate cancer (H) [C61]    Procedure(s):  ROBOTIC ASSISTED LAPAROSCOPIC RADICAL PROSTATECTOMY WITH BILATERAL PELVIC LYMPH NODE DISSECTION - POSSIBLE OPEN    Past Medical History:   Diagnosis Date     Gastroesophageal reflux disease      Prostate cancer (H)      Shingles     in the eye     Past Surgical History:   Procedure Laterality Date     PROSTATE SURGERY         Anesthesia Evaluation     . Pt has not had prior anesthetic            ROS/MED HX    ENT/Pulmonary:  - neg pulmonary ROS    (-) sleep apnea   Neurologic:  - neg neurologic ROS     Cardiovascular:  - neg cardiovascular ROS      (-) hypertension   METS/Exercise Tolerance:     Hematologic:         Musculoskeletal: Comment: Back pain        GI/Hepatic:     (+) GERD Asymptomatic on medication,       Renal/Genitourinary:     (+) Other Renal/ Genitourinary,       Endo:  - neg endo ROS       Psychiatric:         Infectious Disease:         Malignancy:   (+) Malignancy History of Prostate          Other:                          Physical Exam  Normal systems: cardiovascular and pulmonary    Airway   Mallampati: II  TM distance: >3 FB  Neck ROM: full    Dental   (+) other  Comment: bridge    Cardiovascular       Pulmonary             Lab Results   Component Value Date    WBC 6.2 2020    HGB 14.3 2020    HCT 43.1 2020     2020     2020    POTASSIUM 3.7 2020    CHLORIDE 109 2020    CO2 24 2020    BUN 7 2020    CR 0.87 2020    GLC 90 2020    GEOFF 9.1 2020    ALBUMIN 3.7 2014    PROTTOTAL 7.8 2014    ALT 34 2014    AST 25 2014    ALKPHOS 69 2014    BILITOTAL 0.4 2014    TSH 2.26 2020       Preop Vitals  BP Readings from Last 3 Encounters:   20 133/80   20 118/70   10/14/20 118/70    Pulse Readings from  "Last 3 Encounters:   12/09/20 69   12/02/20 75   10/14/20 85      Resp Readings from Last 3 Encounters:   12/09/20 16   08/02/16 15    SpO2 Readings from Last 3 Encounters:   12/09/20 100%   12/02/20 100%   10/14/20 98%      Temp Readings from Last 1 Encounters:   12/09/20 36.8  C (98.3  F) (Tympanic)    Ht Readings from Last 1 Encounters:   12/09/20 1.676 m (5' 6\")      Wt Readings from Last 1 Encounters:   12/09/20 71.7 kg (158 lb)    Estimated body mass index is 25.5 kg/m  as calculated from the following:    Height as of 12/9/20: 1.676 m (5' 6\").    Weight as of 12/9/20: 71.7 kg (158 lb).       Anesthesia Plan      History & Physical Review  History and physical reviewed and following examination; no interval change.    ASA Status:  2 .    NPO Status:  > 8 hours    Plan for General with Intravenous and Propofol induction. Maintenance will be Balanced.    PONV prophylaxis:  Ondansetron (or other 5HT-3)         Postoperative Care  Postoperative pain management:  Multi-modal analgesia.      Consents  Anesthetic plan, risks, benefits and alternatives discussed with:  Patient..                 Jordon Torres MD  "

## 2020-12-14 NOTE — ANESTHESIA CARE TRANSFER NOTE
Patient: Sowmya Villavicencio    Procedure(s):  ROBOTIC ASSISTED LAPAROSCOPIC RADICAL PROSTATECTOMY WITH BILATERAL PELVIC LYMPH NODE DISSECTION    Diagnosis: Prostate cancer (H) [C61]  Diagnosis Additional Information: No value filed.    Anesthesia Type:   General     Note:  Airway :Face Mask  Patient transferred to:PACU  Comments: Neuromuscular blockade reversed after TOF 4/4, spontaneous respirations, adequate tidal volumes, followed commands to voice, oropharynx suctioned with soft flexible catheter, extubated atraumatically, extubated with suction, airway patent after extubation.  Oxygen via facemask at 6 liters per minute to PACU. Oxygen tubing connected to wall O2 in PACU, SpO2, NiBP, and EKG monitors and alarms on and functioning, Tanya Hugger warmer connected to patient gown, report on patient's clinical status given to PACU RN, RN questions answered.   Handoff Report: Identifed the Patient, Identified the Reponsible Provider, Reviewed the pertinent medical history, Discussed the surgical course, Reviewed Intra-OP anesthesia mangement and issues during anesthesia, Set expectations for post-procedure period and Allowed opportunity for questions and acknowledgement of understanding      Vitals: (Last set prior to Anesthesia Care Transfer)    CRNA VITALS  12/14/2020 1607 - 12/14/2020 1644      12/14/2020             Pulse:  104    SpO2:  100 %    Resp Rate (observed):  12                Electronically Signed By: CIRA Valerio CRNA  December 14, 2020  4:44 PM

## 2020-12-14 NOTE — OP NOTE
OPERATIVE REPORT  DATE OF SURGERY: 20  LOCATION OF SURGERY: [facility]  PREOPERATIVE DIAGNOSIS:  (C61) Prostate cancer (H)  (primary encounter diagnosis)  POSTOPERATIVE DIAGNOSIS: (C61) Prostate cancer (H)  (primary encounter diagnosis)    START TIME: 1:32 PM  END TIME: 4:28 PM  PROCEDURE PERFORMED:   1. ROBOTIC ASSISTED LAPAROSCOPIC RADICAL PROSTATECTOMY   2. ROBOTIC ASSISTED LAPAROSCOPIC BILATERAL PELVIC LYMPH NODE DISSECTION      STAFF SURGEON: Dov Tellez MD  RESIDENT SURGEON: Elida Le MD  ASSISTANT: Bety Real  ANESTHESIA: General.   ESTIMATED BLOOD LOSS: 50 mL.   DRAINS AND TUBES: 18fr Eek tip catheter with 15cc in the balloon, 19fr SARAH drain  COMPLICATIONS: None.   DISPOSITION: PACU.   SPECIMENS OBTAINED: .  ID Type Source Tests Collected by Time Destination   A : prostate and seminal vessicles  Tissue Prostate SURGICAL PATHOLOGY EXAM Dov Tellez MD 2020  4:07 PM    B : bilateral pelvic lymph nodes  Tissue Pelvis SURGICAL PATHOLOGY EXAM Dov Tellez MD 2020  4:08 PM      SIGNIFICANT FINDINGS: Prostate and SVs removed en bloc. Bilateral pelvic lymph nodes removed. Water-tight anastomosis.     HISTORY OF PRESENT ILLNESS: Sowmya Villavicencio is a 60 year old man with PSA 30.50 and Wolfgang 4+5=9 prostate cancer with MRI showing PIRADs 5 lesion with high suspicion of extra-prostatic extension and possible SV invasion. No evidence of lymphadenopathy. He was counseled on treatment options and elected to proceed with the above surgery.     OPERATION PERFORMED:   Informed consent was obtained and the patient was brought to the operating room where general anesthesia was induced. The patient was given appropriate preoperative antibiotics and positioned supine. We then performed a timeout, verifying the correct patient's site and procedure to be performed.    Port placement was performed in the usual fashion includin mm supraumbilical camera port via Veress needle; Two left  lateral robotic ports 8mm under direct vision; One right lateral robotic port under direct vision; 12mm right lateral assist port under direct vision. The robot was then docked. Sigmoid adhesions were taken down from the left pelvic side wall and inguinal area. The bladder was then dissected free from the anterior abdominal wall. The endopelvic fascia was cleared using electrocautery. The superficial dorsal vein was controlled with electrocautery. The endopelvic fascia was then incised sharply on both sides of the prostate to expose the dorsal venous complex. The dorsal vein was then ligated with 0 Monocyrl suture. The bladder neck was then dissected free from the prostate with care not to damage the ureteral orifices. The vas and seminal vesicles were dissected free from their posterior prostate attachments. Dissection was carried further posteriorly, exposing Denonvillier's fascia, which was incised and used to develop a plane to the prostatic apex posteriorly. The prostatic pedicles bilaterally were divided with Weck clips and scissors and right nerve sparing was performed and partial left nerve sparing was performed due to the location of his disease. The dorsal vein complex was then divided with electrocautery and urethra transected sharply to completely free the prostate.       The lymph nodes were dissected in the usual fashion with electrocautery bilaterally. The boundaries of the dissection included the bifurcation of the common iliac vein to the external iliac vein posteriorly, pelvic side wall laterally and anteriorly, and the obturator nerve inferiorly. The specimen was placed in an endocatch bag.       The anastomosis was then created between the bladder neck and the urethra using 3-0 V-lock suture. This was completed in a running fashion. A fresh 18Fr Faye catheter was then inserted, the balloon was inflated with 15 cc water, and the bladder was filled with normal saline. The vesicourethral  anastomosis was noted to be watertight.      SARAH was placed in the left lateral pelvic gutter and brought out through the lateral port site. The assistant port was closed with a Tomás-Noble device. The remaining ports were removed under direct vision with no bleeding noted from the abdominal wall, and the abdomen was desufflated. The midline camera port site was extended and the specimen was extracted. The fascia was then closed with 0 PDS. All port sites were irrigated with normal saline. 30 cc marcaine was injected for incisional analgesia. The skin was closed with 4-0 monocyl in a running subcuticular fashion and skin glue was applied.      At the completion of the procedure, all surgical counts were correct.       The patient tolerated the procedure well, and he was awakened from anesthesia, extubated and transferred to the PACU in stable condition.    I was scrubbed and performed the entire procedure with assistance.     Dov Tellez MD   Urology  [facility]  Clinic Phone [phone]

## 2020-12-14 NOTE — ANESTHESIA PROCEDURE NOTES
Airway   Date/Time: 12/14/2020 1:10 PM   Patient location during procedure: OR    Staff -   Anesthesiologist:  Jordon Torres MD  CRNA: Gauri Henley APRN CRNA  Other Anesthesia Staff: Marky Blake  Performed By: JOSE ELIAS    Consent for Airway   Urgency: elective    Indications and Patient Condition  Indications for airway management: helder-procedural  Induction type:intravenousMask difficulty assessment: 2 - vent by mask + OA or adjuvant +/- NMBA    Final Airway Details  Final airway type: endotracheal airway  Successful airway:ETT - single  Endotracheal Airway Details   ETT size (mm): 8.0  Cuffed: yes  Successful intubation technique: direct laryngoscopy  Grade View of Cords: 1  Adjucts: stylet  Measured from: lips  Secured at (cm): 22  Secured with: pink tape    Post intubation assessment   Placement verified by: capnometry, equal breath sounds and chest rise   Number of attempts at approach: 1  Secured with:pink tape  Ease of procedure: easy  Dentition: Intact and Unchanged

## 2020-12-15 VITALS
OXYGEN SATURATION: 98 % | DIASTOLIC BLOOD PRESSURE: 68 MMHG | TEMPERATURE: 98.7 F | SYSTOLIC BLOOD PRESSURE: 123 MMHG | RESPIRATION RATE: 16 BRPM | HEART RATE: 69 BPM

## 2020-12-15 LAB
ANION GAP SERPL CALCULATED.3IONS-SCNC: 3 MMOL/L (ref 3–14)
BUN SERPL-MCNC: 7 MG/DL (ref 7–30)
CALCIUM SERPL-MCNC: 8.3 MG/DL (ref 8.5–10.1)
CHLORIDE SERPL-SCNC: 108 MMOL/L (ref 94–109)
CO2 SERPL-SCNC: 26 MMOL/L (ref 20–32)
CREAT FLD-MCNC: 0.7 MG/DL
CREAT SERPL-MCNC: 0.83 MG/DL (ref 0.66–1.25)
ERYTHROCYTE [DISTWIDTH] IN BLOOD BY AUTOMATED COUNT: 12.8 % (ref 10–15)
GFR SERPL CREATININE-BSD FRML MDRD: >90 ML/MIN/{1.73_M2}
GLUCOSE SERPL-MCNC: 96 MG/DL (ref 70–99)
HCT VFR BLD AUTO: 34.7 % (ref 40–53)
HGB BLD-MCNC: 11.3 G/DL (ref 13.3–17.7)
MCH RBC QN AUTO: 29 PG (ref 26.5–33)
MCHC RBC AUTO-ENTMCNC: 32.6 G/DL (ref 31.5–36.5)
MCV RBC AUTO: 89 FL (ref 78–100)
PLATELET # BLD AUTO: 260 10E9/L (ref 150–450)
POTASSIUM SERPL-SCNC: 3.8 MMOL/L (ref 3.4–5.3)
RBC # BLD AUTO: 3.9 10E12/L (ref 4.4–5.9)
SODIUM SERPL-SCNC: 137 MMOL/L (ref 133–144)
SPECIMEN SOURCE FLD: NORMAL
WBC # BLD AUTO: 12.8 10E9/L (ref 4–11)

## 2020-12-15 PROCEDURE — 85027 COMPLETE CBC AUTOMATED: CPT | Performed by: STUDENT IN AN ORGANIZED HEALTH CARE EDUCATION/TRAINING PROGRAM

## 2020-12-15 PROCEDURE — 82570 ASSAY OF URINE CREATININE: CPT | Performed by: STUDENT IN AN ORGANIZED HEALTH CARE EDUCATION/TRAINING PROGRAM

## 2020-12-15 PROCEDURE — 250N000009 HC RX 250: Performed by: UROLOGY

## 2020-12-15 PROCEDURE — 250N000013 HC RX MED GY IP 250 OP 250 PS 637: Performed by: STUDENT IN AN ORGANIZED HEALTH CARE EDUCATION/TRAINING PROGRAM

## 2020-12-15 PROCEDURE — 250N000011 HC RX IP 250 OP 636: Performed by: STUDENT IN AN ORGANIZED HEALTH CARE EDUCATION/TRAINING PROGRAM

## 2020-12-15 PROCEDURE — 87389 HIV-1 AG W/HIV-1&-2 AB AG IA: CPT | Performed by: STUDENT IN AN ORGANIZED HEALTH CARE EDUCATION/TRAINING PROGRAM

## 2020-12-15 PROCEDURE — 36415 COLL VENOUS BLD VENIPUNCTURE: CPT | Performed by: STUDENT IN AN ORGANIZED HEALTH CARE EDUCATION/TRAINING PROGRAM

## 2020-12-15 PROCEDURE — 86706 HEP B SURFACE ANTIBODY: CPT | Performed by: STUDENT IN AN ORGANIZED HEALTH CARE EDUCATION/TRAINING PROGRAM

## 2020-12-15 PROCEDURE — 258N000003 HC RX IP 258 OP 636: Performed by: STUDENT IN AN ORGANIZED HEALTH CARE EDUCATION/TRAINING PROGRAM

## 2020-12-15 PROCEDURE — 80048 BASIC METABOLIC PNL TOTAL CA: CPT | Performed by: STUDENT IN AN ORGANIZED HEALTH CARE EDUCATION/TRAINING PROGRAM

## 2020-12-15 PROCEDURE — 87340 HEPATITIS B SURFACE AG IA: CPT | Performed by: STUDENT IN AN ORGANIZED HEALTH CARE EDUCATION/TRAINING PROGRAM

## 2020-12-15 PROCEDURE — 86803 HEPATITIS C AB TEST: CPT | Performed by: STUDENT IN AN ORGANIZED HEALTH CARE EDUCATION/TRAINING PROGRAM

## 2020-12-15 PROCEDURE — 96372 THER/PROPH/DIAG INJ SC/IM: CPT | Performed by: STUDENT IN AN ORGANIZED HEALTH CARE EDUCATION/TRAINING PROGRAM

## 2020-12-15 RX ORDER — GINSENG 100 MG
CAPSULE ORAL
Status: DISCONTINUED | OUTPATIENT
Start: 2020-12-15 | End: 2020-12-15 | Stop reason: HOSPADM

## 2020-12-15 RX ADMIN — KETOROLAC TROMETHAMINE 15 MG: 15 INJECTION, SOLUTION INTRAMUSCULAR; INTRAVENOUS at 00:04

## 2020-12-15 RX ADMIN — ACETAMINOPHEN 650 MG: 325 TABLET, FILM COATED ORAL at 04:55

## 2020-12-15 RX ADMIN — POLYETHYLENE GLYCOL 3350 17 G: 17 POWDER, FOR SOLUTION ORAL at 08:17

## 2020-12-15 RX ADMIN — SODIUM CHLORIDE: 9 INJECTION, SOLUTION INTRAVENOUS at 03:37

## 2020-12-15 RX ADMIN — HEPARIN SODIUM 5000 UNITS: 5000 INJECTION, SOLUTION INTRAVENOUS; SUBCUTANEOUS at 12:19

## 2020-12-15 RX ADMIN — KETOROLAC TROMETHAMINE 15 MG: 15 INJECTION, SOLUTION INTRAMUSCULAR; INTRAVENOUS at 06:06

## 2020-12-15 RX ADMIN — TOLTERODINE TARTRATE 4 MG: 4 CAPSULE, EXTENDED RELEASE ORAL at 08:17

## 2020-12-15 RX ADMIN — OXYCODONE HYDROCHLORIDE 5 MG: 5 TABLET ORAL at 13:32

## 2020-12-15 RX ADMIN — BACITRACIN: 500 OINTMENT TOPICAL at 06:06

## 2020-12-15 RX ADMIN — FAMOTIDINE 20 MG: 20 TABLET ORAL at 08:17

## 2020-12-15 RX ADMIN — ACETAMINOPHEN 650 MG: 325 TABLET, FILM COATED ORAL at 12:18

## 2020-12-15 RX ADMIN — ACETAMINOPHEN 650 MG: 325 TABLET, FILM COATED ORAL at 08:17

## 2020-12-15 RX ADMIN — KETOROLAC TROMETHAMINE 15 MG: 15 INJECTION, SOLUTION INTRAMUSCULAR; INTRAVENOUS at 12:18

## 2020-12-15 RX ADMIN — HEPARIN SODIUM 5000 UNITS: 5000 INJECTION, SOLUTION INTRAVENOUS; SUBCUTANEOUS at 03:37

## 2020-12-15 RX ADMIN — ACETAMINOPHEN 650 MG: 325 TABLET, FILM COATED ORAL at 00:04

## 2020-12-15 NOTE — PROVIDER NOTIFICATION
Paged Dr. Peters about pt's lactic acid being 3.0 after protocol flagging. Pt's WBC 23.5, but all other vitals stable and pt does not appear septic. Per MD, continue to monitor. If pt spikes temp call for further guidance.

## 2020-12-15 NOTE — PROGRESS NOTES
Urology Daily Progress Note    24 hour events/Subjective:     - No acute events overnight   - Pain well controlled on current regimen   - Tolerating full liquid diet ; no nausea or vomiting   - Not yet passing flatus.   - Ambulated yesterday      O:  Vitals: /68 (BP Location: Left arm)   Pulse 69   Temp 98.7  F (37.1  C) (Oral)   Resp 16   SpO2 98%     General: Alert, interactive, in NAD  Resp: Non-labored breathing on RA  Abdomen: Soft, appropriately tender, mildly distended, incisions C/D/I   Ext: Warm and well perfused   Faye: Clear yellow   Drain: Serosanguinous    I/O  UOP  825/NR  Drain 245/NR    Labs  Mild leukocytosis, improving  Mild dec in Hgb  Cr stable    Heme:  Recent Labs   Lab 12/15/20  0731 12/14/20  1755 12/09/20  1416   WBC 12.8* 23.5* 6.2   HGB 11.3* 13.4 14.3    297 282     Chem:  Recent Labs   Lab 12/15/20  0731 12/14/20  1755 12/09/20  1416   POTASSIUM 3.8 3.4 3.7   CR 0.83 0.84 0.87         Assessment/Plan  60 year old y/o male POD#1 s/p RALP with PLND     Note - plan changes for the day are in BOLD  NEURO Pain well controlled on current regimen  Toradol: yes   CV HDS.    PULM Aggressive pulmonary toilet and I/S.   FEN/GI IVF: NS @ 100 ml/hr  Diet: adat  Bowel regimen: yes        Faye to stay  SARAH Cr check, then remove before d/c if consistent with serum Cr   HEME Hgb as above.    ID Afebrile, mild post op leukocytosis   Antibiotics: Completed periop antibiotics    ENDO No issues   ACTIVITY Up as tolerated  Ambulate at least TID    PPx Heparin 5000 U TID   HOME RX ON HOLD none   DISPO Home today    PT/OT recommendations: not ordered      To be discussed with Dr. Tellez    --    Elida Le MD

## 2020-12-15 NOTE — PLAN OF CARE
Patient discharged at 3:00 PM to Discharged to home  IV & SARAH were discontinued. Pain at time of discharge was 1/10. Belongings returned to patient.  Discharge instructions including fuentes care teaching and medications reviewed with patient.  Patient verbalized understanding and all questions were answered.  Prescriptions given to patient.  At time of discharge, patient condition was stable and left the unit escorted by CNA.

## 2020-12-15 NOTE — ANESTHESIA POSTPROCEDURE EVALUATION
Patient: Sowmya Villavicencio    Procedure(s):  ROBOTIC ASSISTED LAPAROSCOPIC RADICAL PROSTATECTOMY WITH BILATERAL PELVIC LYMPH NODE DISSECTION    Diagnosis:Prostate cancer (H) [C61]  Diagnosis Additional Information: No value filed.    Anesthesia Type:  General    Note:  Anesthesia Post Evaluation    Patient location during evaluation: PACU  Patient participation: Able to fully participate in evaluation  Level of consciousness: awake and alert  Pain management: adequate  Airway patency: patent  Cardiovascular status: acceptable  Respiratory status: acceptable  Hydration status: acceptable  PONV: none and controlled     Anesthetic complications: None          Last vitals:  Vitals:    12/14/20 1800 12/14/20 1810 12/14/20 1830   BP: 137/79  (!) 155/75   Pulse: 86 94 95   Resp: 15 12 12   Temp:   36.9  C (98.5  F)   SpO2: 100% 100% 97%         Electronically Signed By: Ravindra Monae MD  December 14, 2020  7:12 PM

## 2020-12-15 NOTE — PLAN OF CARE
Patient is A&Ox4. POD1 of Prostatectomy. VSS on room air. C/o slight abdominal discomfort, controlled with scheduled tylenol and toradol. Faye with sediment and susan urine. L SARAH with bloody drainage. Up SBA. CAPNO WNL. 3 lap sites with liquid bandage. Calls appropriately.

## 2020-12-15 NOTE — PLAN OF CARE
Pt arrived from PACU at 1830. Vitals stable. A&O. Ambulated halls SBA. C/o pain in abdomen and penis from catheter- Scheduled Toradol and Tylenol effective as well as PRN Dilaudid x1. Tolerating clears. Fuentes draining blood tinged urine, a few small clots noted. L abdominal SARAH w/ bloody out put. Lacitc protocol flagged 3.0- MD notified, continue to monitor. Likely discharge to home tomorrow, will need fuentes teaching.

## 2020-12-16 LAB
ABNORMAL SPERM: 99 MORPHOLOGY
ABSTINENCE DAYS: 3 DAYS (ref 2–7)
AGGLUTINATION: NO YES/NO
ANALYSIS TEMP - CENTIGRADE: 23 CENTIGRADE
CELL FRAGMENTS: ABNORMAL %
COLLECTION METHOD: ABNORMAL
COLLECTION SITE: ABNORMAL
CONSENT TO RELEASE TO PARTNER: YES
HBV SURFACE AB SERPL IA-ACNC: 3.33 M[IU]/ML
HBV SURFACE AG SERPL QL IA: NONREACTIVE
HCV AB SERPL QL IA: NONREACTIVE
HEAD DEFECT: 99
HIV 1+2 AB+HIV1 P24 AG SERPL QL IA: NONREACTIVE
IMMATURE SPERM: ABNORMAL %
IMMOTILE: 67 %
LAB RECEIPT TIME: ABNORMAL
LIQUEFIED: YES YES/NO
MIDPIECE DEFECT: 66
NON-PROGRESSIVE MOTILITY: 4 %
NORMAL SPERM: 1 % NORMAL FORMS (ref 4–?)
PROGRESSIVE MOTILITY: 29 % (ref 32–?)
ROUND CELLS: 0.3 MILLION/ML (ref ?–2)
SPECIMEN CONCENTRATION: 71 MILLION/ML (ref 15–?)
SPECIMEN PH: 7.2 PH (ref 7.2–?)
SPECIMEN TYPE: ABNORMAL
SPECIMEN VOL UR: 1.7 ML (ref 1.5–?)
TAIL DEFECT: 43
TIME OF ANALYSIS: ABNORMAL
TOTAL NUMBER: 121 MILLION (ref 39–?)
TOTAL PROGRESSIVE MOTILE: 35 MILLION (ref 15.6–?)
VISCOUS: NO YES/NO
VITALITY: 44 % (ref 58–?)
WBC SPECIMEN: ABNORMAL %

## 2020-12-18 LAB — COPATH REPORT: NORMAL

## 2020-12-20 ENCOUNTER — HEALTH MAINTENANCE LETTER (OUTPATIENT)
Age: 60
End: 2020-12-20

## 2020-12-23 ENCOUNTER — OFFICE VISIT (OUTPATIENT)
Dept: UROLOGY | Facility: CLINIC | Age: 60
End: 2020-12-23
Payer: COMMERCIAL

## 2020-12-23 VITALS — BODY MASS INDEX: 24.69 KG/M2 | SYSTOLIC BLOOD PRESSURE: 120 MMHG | WEIGHT: 153 LBS | DIASTOLIC BLOOD PRESSURE: 72 MMHG

## 2020-12-23 DIAGNOSIS — C61 PROSTATE CANCER (H): Primary | ICD-10-CM

## 2020-12-23 PROCEDURE — 99024 POSTOP FOLLOW-UP VISIT: CPT | Performed by: UROLOGY

## 2020-12-23 ASSESSMENT — PAIN SCALES - GENERAL: PAINLEVEL: NO PAIN (0)

## 2020-12-23 NOTE — NURSING NOTE
Chief Complaint   Patient presents with     Hx of Prostate cancer     Patient here today for follow up after Surgery and fuentes catheter removal       Blood pressure 120/72, weight 69.4 kg (153 lb). Body mass index is 24.69 kg/m .    Patient Active Problem List   Diagnosis     CARDIOVASCULAR SCREENING; LDL GOAL LESS THAN 160     Erectile dysfunction     GERD (gastroesophageal reflux disease)     Advanced directives, counseling/discussion     Bilateral low back pain with right-sided sciatica     Prostate cancer (H)       Allergies   Allergen Reactions     Amoxicillin      Upset stomach/diarrhea       Current Outpatient Medications   Medication Sig Dispense Refill     cholecalciferol 25 MCG (1000 UT) TABS Take 1 tablet by mouth daily       multivitamin w/minerals (MULTI-VITAMIN) tablet Take 1 tablet by mouth daily ([employer] Immune formula)       docusate sodium (COLACE) 100 MG tablet Take 1 tablet (100 mg) by mouth daily (Patient not taking: Reported on 12/23/2020) 60 tablet 1     oxybutynin ER (DITROPAN-XL) 5 MG 24 hr tablet Take 1 tablet (5 mg) by mouth daily for 14 days Take daily until the day prior to fuentes removal. (Patient not taking: Reported on 12/23/2020) 14 tablet 0     oxyCODONE (ROXICODONE) 5 MG tablet Take 1 tablet (5 mg) by mouth every 6 hours as needed for breakthrough pain (Patient not taking: Reported on 12/23/2020) 9 tablet 0       Social History     Tobacco Use     Smoking status: Never Smoker     Smokeless tobacco: Never Used   Substance Use Topics     Alcohol use: No     Drug use: No           Catheter removal documentation on 12/23/2020:    Sowmya Villavicencio presents to the clinic for catheter removal.  Reason for removal: scheduled for removal  Order has been verified. YES  Catheter successfully removed at 3:00 PM without immediate complication.  50 cc's of urine present in the catheter bag.  Urethral meatus is free of secretions and encrustation.  The patient is afebrile.  The patient  tolerated the procedure and was instructed to return or call for pain, fever, leakage or decreased urine flow, watch for signs of infection and REST AND CALL WITH ANY QUESTIONS.      Tammi Son, SOPHIA  12/23/2020

## 2020-12-23 NOTE — PROGRESS NOTES
"[facility]  CHIEF COMPLAINT   It was my pleasure to see Ingrid Villavicencio who is a 60 year old male for follow-up of prostate cancer .      HPI   Ingrid Villavicencio is a very pleasant 60 year old male    Initially seen 9/25/20:  \"Ingrid Villavicencio is a 60 year old male who is being seen for evaluation of Elevated PSA.  He had recent PSA in August 2020 2.8.  No clear records that I could find of a prior PSA.  He denies any voiding symptoms.  He denies any family history of prostate cancer.  He denies any hip or bony tenderness.\"    10/14/20:  MR- fusion biopsy    Initial PSA: 30.5  Biopsy Wolfgang Score: 4 + 5 = 9  Risk Group: High  Status post RALP/BPLND on 12/14/21  Age at time of surgery: 60  Pathologic Thomas Score: 5 + 4 = 9  Positive Margins: None  Extra Capsular Extension: None  SV Involvement: None  Pathologic Stage: pT2  Number of Lymph Nodes Removed: 7  Number of Positive Lymph Nodes: 0  Nerve status: Right nerve sparing, Left partial nerve sparing    TODAY:        PHYSICAL EXAM  Patient is a 60 year old  male   Vitals: There were no vitals taken for this visit.  There is no height or weight on file to calculate BMI.  General Appearance Adult:   Alert, no acute distress, oriented  HENT: throat/mouth:normal, good dentition  Lungs: no respiratory distress, or pursed lip breathing  Heart: No obvious jugular venous distension present  Abdomen: non - distended  Musculoskeltal: extremities normal, no peripheral edema  Skin: no suspicious lesions or rashes  Neuro: Alert, oriented, speech and mentation normal  Psych: affect and mood normal  Gait: Normal   Rectal: ~30cc gland, firm nodule on the LEFT apex, asymmetric    Component PSA PSA Diag Urologic Phys   Latest Ref Rng & Units 0 - 4 ug/L 0.00 - 4.00 ng/mL   8/17/2020 22.80 (H)    9/25/2020  30.50 (H)       PATHOLOGY:  FINAL DIAGNOSIS:   A.  Prostate, left lateral base , biopsy   - Benign prostatic tissue. Negative for malignancy     B.  Prostate, left lateral mid, biopsy   - " Prostatic adenocarcinoma, acinar type, [place]'s grade 3+ 3 = 6, grade   group is1, number of cores involved   is 1 of 1, total surface area involved is <5 %,perineural invasion is not   identified     C.  Prostate, left lateral apex, biopsy   - Prostatic adenocarcinoma, acinar type, Wolfgang's grade 4 +4 = 8, grade   group is 4, number of cores involved   is 1 of 1, total surface area involved is 40 %,perineural invasion is not   identified     D.  Prostate, left base, biopsy   - Benign prostatic tissue. Negative for malignancy     E.  Prostate, left mid, biopsy   - Prostatic adenocarcinoma, acinar type, Wolfgang's grade 4 + 5 = 9, grade   group is 5, number of cores involved   is 1 of 1, total surface area involved is 75 %,perineural invasion is   identified. Percentage of grade 4 = 90%,   percentage of grade 5 = 10% (Please see comment)     F.  Prostate, left apex, biopsy   - Prostatic adenocarcinoma, acinar type, Wolfgang's grade 4 + 5 = 9, grade   group is 5, number of cores involved   is 1 of 1, total surface area involved is 90 %,perineural invasion is   identified. Percentage of grade 4 = 95%,   percentage of grade 5 = 5% (Please see comment)     G.  Prostate, right lateral base, biopsy   - Benign prostatic tissue. Negative for malignancy     H.  Prostate, right lateral mid, biopsy   - Benign prostatic tissue. Negative for malignancy     I.  Prostate, right lateral apex, biopsy   -Focal high grade prostatic intraepithelial neoplasia. Negative for   invasive malignancy.     J.  Prostate, right base, biopsy   - Benign prostatic tissue. Negative for malignancy     K.  Prostate, right mid, biopsy   - Benign prostatic tissue. Negative for malignan.cy     L.  Prostate, right apex, biopsy   - Focal high-grade prostatic intraepithelial neoplasia. Negative for   invasive malignancy.     M. Prostate, left [place]ley lesion x3, biopsy-   - Prostatic adenocarcinoma, acinar type, Wolfgang's grade 4 + 5 = 9, grade   group is 5,  number of cores involved   is 3 of 3, total surface area involved is 95 %,perineural invasion is   identified. Percentage of grade 4 = 95%,   percentage of grade 5 = 5% (Please see comment)     RALP PATHOLOGY  FINAL DIAGNOSIS:   A.  Specimen type, Procedure:  Prostate gland and attached bilateral   seminal vesicles, robotic assisted   radical prostatectomy with seminal vesicles     Histologic type:  Acinar     Histologic Grade Group and Wolfgang Score:  WHO Grade Group 5;  Wolfgang   score 9 (5+4),  Tertiary pattern grade   3.     Percentage of Pattern 4 in [place] score - Approximately 40%     Tumor Quantitation:   Adenocarcinoma present in 16 of 24 blocks of tissue   examined     Extraprostatic Extension:  Not identified     Urinary Bladder Neck Invasion:  Not identified     Seminal Vesicle Invasion:  Not identified     Lymphovascular Invasin:  Not identified     Perineural Invasion:  Multiple foci present     Margins:  Uninvolved     Treatment Effect:   - No known presurgical therapy     Regional Lymph Nodes:  Seven bilateral pelvic lymph nodes negative for   metastatic tumor, specimen [id number]     Pathologic Stage Classification:  pT2  pN0  pM - Not applicable     B.  Bilateral pelvic lymph nodes, dissection:   - Seven benign lymph nodes, each negative for metastatic tumor.     IMAGING:  All pertinent imaging reviewed:    All imaging studies reviewed by me.  I personally reviewed these imaging films.  A formal report from radiology will follow.    FINDINGS:  Size: 3.5 x 4.8 x 3.8 cm  Volume: 33.2 mL  Hemorrhage: Absent  Peripheral zone: Heterogeneous on T2-weighted images. Suspicious  lesions as detailed below.  Transition zone: Nonenlarged. Transition zone nodules which are  circumscribed or mostly encapsulated without diffusion restriction.   PI-RADS 2.  No highly suspicious nodules.     Lesion(s) in rank order of severity (highest score- to lowest score,  then by size)      Lesion 1:  Location: Left apex  peripheral zone at the 5-6 o'clock position  relative to the urethra. Series 5 image 62.   Additional prostate regions involved: There is abutment of the  external urethral sphincter.  Size: 28 mm  T2 description: Moderate hypointense  T2 numerical assessment: 5  DWI description: Hypointense on ADC and hyperintense on high b-value  DWI  DWI numerical assessment: 5  DCE assessment: Positive    Prostate margin: Capsular abutment 6-15 mm with focal bulging. The  mass is also extending anteriorly and abutting the external urinary  sphincter (series 17 image 18) as well as extending superiorly is a  linear band invading the inferomedial bilateral seminal vesicles  (series 17 image 10).  Lesion overall PI-RADS category: 5     Neurovascular bundles: No neurovascular bundle involvement by  malignancy.  Seminal vesicles: Both seminal vesicles are involved by malignancy.  Lymph nodes: No lymph node involvement  Bones: No suspicious lesions  Other pelvic organs: No additional findings.                                                                   IMPRESSION:  1. Based on the most suspicious abnormality, this exam is  characterized as PIRADS 5 - Clinically significant cancer is highly  likely to be present.  The most suspicious abnormality is located at  the left apex peripheral zone at the 5-6 o'clock position relative to  the urethra and there is capsular bulging indicating moderately high  suspicion of minimal extraprostatic extension. The mass is also  extending anteriorly and abutting the external urinary sphincter as  well as extending superiorly and invading the inferomedial seminal  vesicles.  2. No suspicious adenopathy or evidence of pelvic metastases.    ASSESSMENT and PLAN  60 year old man with PSA 30.50 and Wolfgang 5+4=9 prostate cancer with MRI showing PIRADs 5 lesion with high suspicion of extra-prostatic extension and possible SV invasion. Now s/p RALP/BPLND on 12/14/20 with pT2N0 disease, margins negative      - Faye removal today  - [facility] referral  - PSA in 3 months  - Pathology discussed       I spent over 15 minutes with the patient.  Over half this time was spent on counseling regarding the above.    Dov Tellez MD   Urology  [facility]  [facility] Phone: [phone]  [facility] Phone: [phone]

## 2020-12-31 ENCOUNTER — TELEPHONE (OUTPATIENT)
Dept: UROLOGY | Facility: CLINIC | Age: 60
End: 2020-12-31

## 2020-12-31 DIAGNOSIS — R30.0 DYSURIA: Primary | ICD-10-CM

## 2020-12-31 NOTE — TELEPHONE ENCOUNTER
Patient called nurse line and spoke with this nurse. Patient reports slight burning at tip of penis. Informed patient that he may leave a urine sample to check for infection. Orders placed for [facility]. Also informed patient that he may pick-up AZO for burning. Patient aware and informed that scheduling will call to set-up a lab appointment.    Renee Moon LPN

## 2021-01-02 DIAGNOSIS — R30.0 DYSURIA: ICD-10-CM

## 2021-01-02 LAB
ALBUMIN UR-MCNC: 30 MG/DL
APPEARANCE UR: CLEAR
BILIRUB UR QL STRIP: NEGATIVE
COLOR UR AUTO: YELLOW
GLUCOSE UR STRIP-MCNC: NEGATIVE MG/DL
HGB UR QL STRIP: ABNORMAL
KETONES UR STRIP-MCNC: NEGATIVE MG/DL
LEUKOCYTE ESTERASE UR QL STRIP: ABNORMAL
NITRATE UR QL: NEGATIVE
PH UR STRIP: 5.5 PH (ref 5–7)
SOURCE: ABNORMAL
SP GR UR STRIP: 1.02 (ref 1–1.03)
UROBILINOGEN UR STRIP-ACNC: 0.2 EU/DL (ref 0.2–1)

## 2021-01-02 PROCEDURE — 81003 URINALYSIS AUTO W/O SCOPE: CPT | Performed by: UROLOGY

## 2021-01-02 PROCEDURE — 87086 URINE CULTURE/COLONY COUNT: CPT | Performed by: UROLOGY

## 2021-01-03 LAB
BACTERIA SPEC CULT: NO GROWTH
Lab: NORMAL
SPECIMEN SOURCE: NORMAL

## 2021-01-04 NOTE — TELEPHONE ENCOUNTER
Patient returned phone call and was informed that Urine culture was negative for infection.    Renee Moon LPN

## 2021-01-08 ENCOUNTER — THERAPY VISIT (OUTPATIENT)
Dept: PHYSICAL THERAPY | Facility: CLINIC | Age: 61
End: 2021-01-08
Attending: UROLOGY
Payer: COMMERCIAL

## 2021-01-08 DIAGNOSIS — C61 PROSTATE CANCER (H): ICD-10-CM

## 2021-01-08 PROCEDURE — 97530 THERAPEUTIC ACTIVITIES: CPT | Mod: GP | Performed by: PHYSICAL THERAPIST

## 2021-01-08 PROCEDURE — 97161 PT EVAL LOW COMPLEX 20 MIN: CPT | Mod: GP | Performed by: PHYSICAL THERAPIST

## 2021-01-08 PROCEDURE — 97110 THERAPEUTIC EXERCISES: CPT | Mod: GP | Performed by: PHYSICAL THERAPIST

## 2021-01-08 NOTE — PROGRESS NOTES
[facility] Initial Evaluation  Subjective:  The history is provided by the patient. No  was used.   Patient Health History  Sowmya Villavicencio being seen for Physical Therapy.     Date of Onset: 12/14/20 date of RALP.   Problem occurred: prostate surgery   Pain is reported as 1/10 on pain scale.  General health as reported by patient is good.  Health conditions: cancer.   Red flags:  None as reported by patient.  Medical allergies: none.    Other surgery history details: prostatectomy.        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   Primary job tasks include:  Computer work.                  Therapist Assessment:   Clinical Impression: Pt presents s/p RALP performed on 12/14/20 with primary complaint of urinary incontinence.  Per clinical examination, pt with decreased pelvic floor strength.  Pt will benefit from skilled physical therapy for pelvic floor strengthening, bladder health education, and proximal strengthening.    Chief Complaint:  Pt presents today s/p RALP/BPLND on 12/14/20. The pt reports his urges to urinate have been different prior to the surgery. The pt reports he will feel a pressure or a need to go. Pt reports sometimes leaking a little bit on the way to the bathroom, when getting in/out of the car, and sneezing.    Current activity:  Walks outside at least 1x a day    Goals: no urinary leakage    Urination   Do you feel the sensation of your bladder filling? no  Do you leak on the way to the bathroom or with a strong urge to void? no  Do you leak with cough, sneeze, jumping, running? yes  Any other activities that cause leaking? no  Do you have triggers that make you feel you can't wait to go to the bathroom? n/a What are they? n/a  Type of pad and number used per day? 2 depends  When you leak what is the amount? Small drops  How long can you delay the need to urinate? 5-10 minutes  How many times do you get up to urinate at night? 1x  How many times do you  urinate during the day? 3-5x a  Can you stop the flow of urine when on the toilet? yes  Is the volume of urine passed usually: average  Do you strain to pass urine? no  Do you have a slow or hesitant urinary stream? no  Do you have difficulty initiating the urine stream? no  How many bladder infections have you had in the last 12 months? 0  What is you fluid intake per day? Water (8oz) 4-6, 1 8oz smoothie Caffeine occasionally a latte  Alcohol 0    Bowel Habits  Frequency of bowel movements? 1x a day  Consistency of stool? Soft formed  Do you ignore the urge to defecate? no  Do you strain to pass stool? no    Pelvic Pain  When do you have pelvic pain? no  Have you ever been worried for your physical safety? 4-6  Are you currently on any medication for your pain or bladder control? no    Objective:    OBSERVATION: forward head, rounded shoulders    ABDOMINAL WALL: abdominal incisions healing nicely no erythema    EXTERNAL ANORECTAL ASSESSMENT:  Skin condition:normal  Bearing down/coughing:NT  Muscle contraction/perineal mobility: elevation and urogenital triangle descent        SEMG BIOFEEDBACK  Surface Electrode Placement:   Perianal: Levator ani     Baseline EMG PM:supine: 2.5-3.0uV, seated: 0.5uV  Peak PFM contraction: 8uV  Endurance:10 seconds, 10x with 10 second rest between contractions  Quick flicks; 10/10     Lumbar ROM: WNL      Assessment/Plan:    Patient is a 60 year old male with pelvic complaints.    Patient has the following significant findings with corresponding treatment plan.                Diagnosis 1:  Pelvic floor dysfunction  Decreased strength - therapeutic exercise, therapeutic activities and home program  Impaired muscle performance - biofeedback, electric stimulation, neuro re-education and home program  Impaired posture - neuro re-education, therapeutic activities and home program    Therapy Evaluation Codes:   Cumulative Therapy Evaluation is: Low complexity.    Previous and current  functional limitations:  (See Goal Flow Sheet for this information)    Short term and Long term goals: (See Goal Flow Sheet for this information)     Communication ability:  Patient appears to be able to clearly communicate and understand verbal and written communication and follow directions correctly.  Treatment Explanation - The following has been discussed with the patient:   RX ordered/plan of care  Anticipated outcomes  Possible risks and side effects  This patient would benefit from PT intervention to resume normal activities.   Rehab potential is good.    Frequency:  2 X a month, once daily  Duration:  for 3 months  Discharge Plan:  Achieve all LTG.  Independent in home treatment program.  Reach maximal therapeutic benefit.    Please refer to the daily flowsheet for treatment today, total treatment time and time spent performing 1:1 timed codes.

## 2021-01-22 ENCOUNTER — THERAPY VISIT (OUTPATIENT)
Dept: PHYSICAL THERAPY | Facility: CLINIC | Age: 61
End: 2021-01-22
Payer: COMMERCIAL

## 2021-01-22 DIAGNOSIS — C61 PROSTATE CANCER (H): ICD-10-CM

## 2021-01-22 PROCEDURE — 97530 THERAPEUTIC ACTIVITIES: CPT | Mod: GP | Performed by: PHYSICAL THERAPIST

## 2021-01-22 PROCEDURE — 97110 THERAPEUTIC EXERCISES: CPT | Mod: GP | Performed by: PHYSICAL THERAPIST

## 2021-01-29 ENCOUNTER — TELEPHONE (OUTPATIENT)
Dept: UROLOGY | Facility: CLINIC | Age: 61
End: 2021-01-29

## 2021-01-29 NOTE — TELEPHONE ENCOUNTER
Urology pt of Dr. Tellez s/p laparoscopic radical prostatectomy and bilateral pelvic lymph node dissection 12/14/20, and seen in follow-up 12/23. Sowmya calls today asking if he has any physical restrictions at this point post-op.   He reports feeling well. He has been doing his PT per [facility]. He needs PSA checked 3 months after his surgery. No more physical restrictions but advice is to start gently and increase physical activity as tolerated. He expressed understanding.

## 2021-02-18 ENCOUNTER — NURSE TRIAGE (OUTPATIENT)
Dept: NURSING | Facility: CLINIC | Age: 61
End: 2021-02-18

## 2021-02-18 NOTE — TELEPHONE ENCOUNTER
Patient calling requesting information on COVID 19 vaccines for his age group.    We are working hard to begin vaccinating more people against COVID-19. Currently, we are only vaccinating individuals age 75 and older and Phase 1a workers - healthcare workers who are unable to do their job remotely. Vaccine availability is very limited.    If you are 75 or older, or a healthcare worker who is unable to do your job remotely, please log in to [employer] using this link to see if we have an open appointment and schedule an appointment.  If there are no appointments left, you will be unable to schedule and need to check back later.  If you are a healthcare worker, you will be asked to provide proof of employment at your appointment. If you cannot, you will be turned away.    Vaccine appointments are being added as they become available. Please check your [employer] account frequently for availability. If you have technical difficulty using [employer], call [phone] for assistance.    You can learn more about the [facility]'s phased approach to administering the vaccine, with details on each phase, [email or web address].      As vaccine supply increases and we are able to open appointments to more groups, we will share that information on our website [email or web address]. Check this website to stay up to date on COVID-19 vaccination information.      Clarissa Gillespie RN  [facility]        Reason for Disposition    COVID-19 vaccine, Frequently Asked Questions (FAQs)    Additional Information    Negative: [1] Difficulty breathing or swallowing AND [2] starts within 2 hours after injection    Negative: Sounds like a life-threatening emergency to the triager    Negative: [1] COVID-19 exposure AND [2] no symptoms    Negative: [1] Typical COVID-19 symptoms AND [2] symptoms that are NOT expected from vaccine (e.g., cough, difficulty breathing, loss of  taste or smell, runny nose, sore throat)    Negative: [1] Typical COVID-19 symptoms AND [2] started > 3 days after getting vaccine    Negative: Fever > 104 F (40 C)    Negative: Sounds like a severe, unusual reaction to the triager    Negative: [1] Redness or red streak around the injection site AND [2] started > 48 hours after getting vaccine AND [3] fever    Negative: [1] Fever > 101 F (38.3 C) AND [2] age > 60 AND [3] started > 48 hours after getting vaccine    Negative: [1] Fever > 100.0 F (37.8 C) AND [2] bedridden (e.g., nursing home patient, CVA, chronic illness, recovering from surgery) AND [3] started > 48 hours after getting vaccine    Negative: [1] Fever > 100.0 F (37.8 C) AND [2] diabetes mellitus or weak immune system (e.g., HIV positive, cancer chemo, splenectomy, organ transplant, chronic steroids) AND [3] started > 48 hours after getting vaccine    Negative: [1] Redness or red streak around the injection site AND [2] started > 48 hours after getting vaccine AND [3] no fever  (Exception: red area < 1 inch or 2.5 cm wide)    Negative: [1] Pain, tenderness, or swelling at the injection site AND [2] over 3 days (72 hours) since vaccine AND [3] getting worse    Negative: Fever > 100.0 F (37.8 C) present > 3 days (72 hours)    Negative: [1] Fever > 100.0 F (37.8 C) AND [2] healthcare worker    Negative: [1] Pain, tenderness, or swelling at the injection site AND [2] lasts > 7 days    Negative: [1] Requesting COVID-19 vaccine AND [2] healthcare worker (e.g., EMS first responders, doctors, nurses)    Negative: [1] Requesting COVID-19 vaccine AND [2] resident of a long-term care facility (e.g., nursing home)    Negative: [1] Requesting COVID-19 vaccine AND [2] vaccine available in the community for this patient group    Negative: COVID-19 vaccine, injection site reaction (e.g., pain, redness, swelling), question about    Negative: COVID-19 vaccine, systemic reactions (e.g., fatigue, fever, muscle aches),  questions about    Protocols used: CORONAVIRUS (COVID-19) VACCINE QUESTIONS AND DYGUBFHHO-A-OM 1.3

## 2021-02-19 ENCOUNTER — THERAPY VISIT (OUTPATIENT)
Dept: PHYSICAL THERAPY | Facility: CLINIC | Age: 61
End: 2021-02-19
Payer: COMMERCIAL

## 2021-02-19 DIAGNOSIS — C61 PROSTATE CANCER (H): ICD-10-CM

## 2021-02-19 PROCEDURE — 97530 THERAPEUTIC ACTIVITIES: CPT | Mod: GP | Performed by: PHYSICAL THERAPIST

## 2021-02-19 PROCEDURE — 97112 NEUROMUSCULAR REEDUCATION: CPT | Mod: GP | Performed by: PHYSICAL THERAPIST

## 2021-03-02 ENCOUNTER — OFFICE VISIT (OUTPATIENT)
Dept: FAMILY MEDICINE | Facility: CLINIC | Age: 61
End: 2021-03-02
Payer: COMMERCIAL

## 2021-03-02 VITALS
WEIGHT: 145 LBS | HEART RATE: 69 BPM | HEIGHT: 66 IN | TEMPERATURE: 96.4 F | DIASTOLIC BLOOD PRESSURE: 72 MMHG | SYSTOLIC BLOOD PRESSURE: 117 MMHG | BODY MASS INDEX: 23.3 KG/M2 | OXYGEN SATURATION: 100 %

## 2021-03-02 DIAGNOSIS — Z13.220 LIPID SCREENING: ICD-10-CM

## 2021-03-02 DIAGNOSIS — G44.219 EPISODIC TENSION-TYPE HEADACHE, NOT INTRACTABLE: Primary | ICD-10-CM

## 2021-03-02 LAB
CRP SERPL-MCNC: <2.9 MG/L (ref 0–8)
ERYTHROCYTE [DISTWIDTH] IN BLOOD BY AUTOMATED COUNT: 12.6 % (ref 10–15)
HCT VFR BLD AUTO: 43.5 % (ref 40–53)
HGB BLD-MCNC: 14.7 G/DL (ref 13.3–17.7)
MCH RBC QN AUTO: 29.8 PG (ref 26.5–33)
MCHC RBC AUTO-ENTMCNC: 33.8 G/DL (ref 31.5–36.5)
MCV RBC AUTO: 88 FL (ref 78–100)
PLATELET # BLD AUTO: 275 10E9/L (ref 150–450)
RBC # BLD AUTO: 4.93 10E12/L (ref 4.4–5.9)
VIT B12 SERPL-MCNC: 498 PG/ML (ref 193–986)
WBC # BLD AUTO: 5.5 10E9/L (ref 4–11)

## 2021-03-02 PROCEDURE — 83735 ASSAY OF MAGNESIUM: CPT | Performed by: INTERNAL MEDICINE

## 2021-03-02 PROCEDURE — 85027 COMPLETE CBC AUTOMATED: CPT | Performed by: INTERNAL MEDICINE

## 2021-03-02 PROCEDURE — 82607 VITAMIN B-12: CPT | Performed by: INTERNAL MEDICINE

## 2021-03-02 PROCEDURE — 86140 C-REACTIVE PROTEIN: CPT | Performed by: INTERNAL MEDICINE

## 2021-03-02 PROCEDURE — 80048 BASIC METABOLIC PNL TOTAL CA: CPT | Performed by: INTERNAL MEDICINE

## 2021-03-02 PROCEDURE — 36415 COLL VENOUS BLD VENIPUNCTURE: CPT | Performed by: INTERNAL MEDICINE

## 2021-03-02 PROCEDURE — 99214 OFFICE O/P EST MOD 30 MIN: CPT | Performed by: INTERNAL MEDICINE

## 2021-03-02 PROCEDURE — 80061 LIPID PANEL: CPT | Performed by: INTERNAL MEDICINE

## 2021-03-02 ASSESSMENT — MIFFLIN-ST. JEOR: SCORE: 1400.47

## 2021-03-02 NOTE — PROGRESS NOTES
"    Assessment & Plan   Problem List Items Addressed This Visit     None      Visit Diagnoses     Episodic tension-type headache, not intractable    -  Primary    Relevant Orders    Basic metabolic panel  (Ca, Cl, CO2, Creat, Gluc, K, Na, BUN)    CBC with platelets    CRP, inflammation    Magnesium    Vitamin B12    Lipid screening        Relevant Orders    Lipid panel reflex to direct LDL Fasting           \"left chin feels different\" can chew and laugh etc..    Tylenol prn headache    Follow up if worsening sx's, or slurred speech or  Facial weakness or numbness or rash    He is concerned about possible shingles, has no symptoms now over left chin or rash,     Feels under stress, helping his cousin with immigration    Keep with exercise, and healthy diet. Keep well hydrated         See Patient Instructions    Return in about 4 weeks (around 3/30/2021), or if symptoms worsen or fail to improve, for As needed and if symptoms worsen.    Sara Espitia MD  [facility] IVY Deng is a 60 year old who presents for the following health issues     HPI   Chief Complaint   Patient presents with     Patient Inquiry     left facial tightness and HA last night.  Pt wonder if might be start of shigles. Previously had in right eye in 2012      Establish Care     prev clinic closed.  Will be scheduling Phy in future and catching up on shots when things settle down with Covid precautions     Sunday night, feeling tightness \"?different \" in left chin, woke up in middle of night with h/a, on Sunday ,  Yesterday was good, but in middle of night had h/a    No facial rash,     No tightness of chin anymore, symptoms in left chin is gone, no slurred, no problem with chewing , no dental pain,     No burning sensation, on face    No dental issues,     No fever or chills or body aches,     No blurry vision, no vomiting,     No URI's sx's , no exposure to COVID,     No body aches or malaise,     H/a left " "temple, , tylenol helps    Had shingles in right eye and right face in 2012, vision is fine in both eyes.    Review of Systems   Constitutional, HEENT, cardiovascular, pulmonary, gi and gu systems are negative, except as otherwise noted.      Objective    /72 (BP Location: Right arm, Patient Position: Chair, Cuff Size: Adult Regular)   Pulse 69   Temp 96.4  F (35.8  C) (Temporal)   Ht 1.676 m (5' 6\")   Wt 65.8 kg (145 lb)   SpO2 100%   BMI 23.40 kg/m    Body mass index is 23.4 kg/m .  Physical Exam   GENERAL: healthy, alert and no distress  EYES: Eyes grossly normal to inspection, PERRL and conjunctivae and sclerae normal  HENT: ear canals and TM's normal, nose and mouth without ulcers or lesions  NECK: no adenopathy, no asymmetry, masses, or scars and thyroid normal to palpation, no bruits  RESP: lungs clear to auscultation - no rales, rhonchi or wheezes  CV: regular rate and rhythm, normal S1 S2, no S3 or S4, no murmur, click or rub, no peripheral edema and peripheral pulses strong  MS: no gross musculoskeletal defects noted, no edema  SKIN: no suspicious lesions or rashes  NEURO: Normal strength and tone, mentation intact and speech normal  PSYCH: mentation appears normal, affect normal/bright    Orders Only on 01/02/2021   Component Date Value Ref Range Status     Specimen Description 01/02/2021 Midstream Urine   Final     Special Requests 01/02/2021 Specimen received in preservative   Final     Culture Micro 01/02/2021 No growth   Final     Color Urine 01/02/2021 Yellow   Final     Appearance Urine 01/02/2021 Clear   Final     Glucose Urine 01/02/2021 Negative  NEG^Negative mg/dL Final     Bilirubin Urine 01/02/2021 Negative  NEG^Negative Final     Ketones Urine 01/02/2021 Negative  NEG^Negative mg/dL Final     Specific Gravity Urine 01/02/2021 1.025  1.003 - 1.035 Final     Blood Urine 01/02/2021 Large* NEG^Negative Final     pH Urine 01/02/2021 5.5  5.0 - 7.0 pH Final     Protein Albumin Urine " 01/02/2021 30* NEG^Negative mg/dL Final     Urobilinogen Urine 01/02/2021 0.2  0.2 - 1.0 EU/dL Final     Nitrite Urine 01/02/2021 Negative  NEG^Negative Final     Leukocyte Esterase Urine 01/02/2021 Small* NEG^Negative Final     Source 01/02/2021 Midstream Urine   Final

## 2021-03-03 LAB
ANION GAP SERPL CALCULATED.3IONS-SCNC: 6 MMOL/L (ref 3–14)
BUN SERPL-MCNC: 11 MG/DL (ref 7–30)
CALCIUM SERPL-MCNC: 9.7 MG/DL (ref 8.5–10.1)
CHLORIDE SERPL-SCNC: 108 MMOL/L (ref 94–109)
CHOLEST SERPL-MCNC: 172 MG/DL
CO2 SERPL-SCNC: 24 MMOL/L (ref 20–32)
CREAT SERPL-MCNC: 0.78 MG/DL (ref 0.66–1.25)
GFR SERPL CREATININE-BSD FRML MDRD: >90 ML/MIN/{1.73_M2}
GLUCOSE SERPL-MCNC: 92 MG/DL (ref 70–99)
HDLC SERPL-MCNC: 47 MG/DL
LDLC SERPL CALC-MCNC: 108 MG/DL
MAGNESIUM SERPL-MCNC: 2.4 MG/DL (ref 1.6–2.3)
NONHDLC SERPL-MCNC: 125 MG/DL
POTASSIUM SERPL-SCNC: 4.1 MMOL/L (ref 3.4–5.3)
SODIUM SERPL-SCNC: 138 MMOL/L (ref 133–144)
TRIGL SERPL-MCNC: 86 MG/DL

## 2021-03-16 ENCOUNTER — IMMUNIZATION (OUTPATIENT)
Dept: NURSING | Facility: CLINIC | Age: 61
End: 2021-03-16
Payer: COMMERCIAL

## 2021-03-16 PROCEDURE — 0001A PR COVID VAC PFIZER DIL RECON 30 MCG/0.3 ML IM: CPT

## 2021-03-16 PROCEDURE — 91300 PR COVID VAC PFIZER DIL RECON 30 MCG/0.3 ML IM: CPT

## 2021-03-24 DIAGNOSIS — C61 PROSTATE CANCER (H): Primary | ICD-10-CM

## 2021-03-26 ENCOUNTER — OFFICE VISIT (OUTPATIENT)
Dept: UROLOGY | Facility: CLINIC | Age: 61
End: 2021-03-26
Payer: COMMERCIAL

## 2021-03-26 VITALS
OXYGEN SATURATION: 100 % | HEIGHT: 66 IN | SYSTOLIC BLOOD PRESSURE: 110 MMHG | WEIGHT: 148 LBS | HEART RATE: 51 BPM | DIASTOLIC BLOOD PRESSURE: 60 MMHG | BODY MASS INDEX: 23.78 KG/M2

## 2021-03-26 DIAGNOSIS — N52.31 ERECTILE DYSFUNCTION AFTER RADICAL PROSTATECTOMY: ICD-10-CM

## 2021-03-26 DIAGNOSIS — C61 PROSTATE CANCER (H): Primary | ICD-10-CM

## 2021-03-26 LAB — PSA SERPL-MCNC: 0.08 NG/ML (ref 0–4)

## 2021-03-26 PROCEDURE — 84153 ASSAY OF PSA TOTAL: CPT | Performed by: UROLOGY

## 2021-03-26 PROCEDURE — 99214 OFFICE O/P EST MOD 30 MIN: CPT | Performed by: UROLOGY

## 2021-03-26 RX ORDER — TADALAFIL 5 MG/1
5 TABLET ORAL DAILY
Qty: 90 TABLET | Refills: 3 | Status: SHIPPED | OUTPATIENT
Start: 2021-03-26 | End: 2021-04-21

## 2021-03-26 ASSESSMENT — PAIN SCALES - GENERAL: PAINLEVEL: NO PAIN (0)

## 2021-03-26 ASSESSMENT — MIFFLIN-ST. JEOR: SCORE: 1414.07

## 2021-03-26 NOTE — Clinical Note
Neptali Espitia,     I saw Sowmya back today for follow-up after his robotic prostatectomy on 12/14/2020.  This showed Wolfgang 5+4 = 9 prostate cancer with negative margins and negative lymph nodes.  Unfortunately, his initial PSA post surgery today resulted at 0.08.  We discussed the implications of this is to be expected help it to be undetectable.  We will plan for a follow-up in 3 months with a repeat PSA.  At that time there is a high likelihood we would need to consider early salvage radiotherapy.    Thanks,   Dov Tellez M.D.  Cell: [phone]

## 2021-03-26 NOTE — PROGRESS NOTES
"[facility]  CHIEF COMPLAINT   It was my pleasure to see Ingrid Villavicencio who is a 60 year old male for follow-up of prostate cancer .      HPI   Ingrid Villavicencio is a very pleasant 60 year old male    Initially seen 9/25/20:  \"Ingrid Villavicencio is a 60 year old male who is being seen for evaluation of Elevated PSA.  He had recent PSA in August 2020 2.8.  No clear records that I could find of a prior PSA.  He denies any voiding symptoms.  He denies any family history of prostate cancer.  He denies any hip or bony tenderness.\"    10/14/20:  MR- fusion biopsy    Initial PSA: 30.5  Biopsy Wolfgang Score: 4 + 5 = 9  Risk Group: High  Status post RALP/BPLND on 12/14/21  Age at time of surgery: 60  Pathologic Sweetwater Score: 5 + 4 = 9  Positive Margins: None  Extra Capsular Extension: None  SV Involvement: None  Pathologic Stage: pT2  Number of Lymph Nodes Removed: 7  Number of Positive Lymph Nodes: 0  Nerve status: Right nerve sparing, Left partial nerve sparing    TODAY:  Doing well with urine control  No leaking for the past 3-4 weeks  Using a pad for security at night  Having only partial erections      PHYSICAL EXAM  Patient is a 60 year old  male   Vitals: Blood pressure 110/60, pulse 51, height 1.676 m (5' 6\"), SpO2 100 %.  Body mass index is 23.4 kg/m .  General Appearance Adult:   Alert, no acute distress, oriented  HENT: throat/mouth:normal, good dentition  Lungs: no respiratory distress, or pursed lip breathing  Heart: No obvious jugular venous distension present  Abdomen: non - distended  Incisions: well healed, no hernia  Musculoskeltal: extremities normal, no peripheral edema  Skin: no suspicious lesions or rashes  Neuro: Alert, oriented, speech and mentation normal  Psych: affect and mood normal  Gait: Normal     Component PSA PSA Diag Urologic Phys   Latest Ref Rng & Units 0 - 4 ug/L 0.00 - 4.00 ng/mL   8/17/2020 22.80 (H)    9/25/2020  30.50 (H)   3/26/2021  0.08     PATHOLOGY:  FINAL DIAGNOSIS:   A.  Prostate, left " lateral base , biopsy   - Benign prostatic tissue. Negative for malignancy     B.  Prostate, left lateral mid, biopsy   - Prostatic adenocarcinoma, acinar type, Wolfgang's grade 3+ 3 = 6, grade   group is1, number of cores involved   is 1 of 1, total surface area involved is <5 %,perineural invasion is not   identified     C.  Prostate, left lateral apex, biopsy   - Prostatic adenocarcinoma, acinar type, [place]'s grade 4 +4 = 8, grade   group is 4, number of cores involved   is 1 of 1, total surface area involved is 40 %,perineural invasion is not   identified     D.  Prostate, left base, biopsy   - Benign prostatic tissue. Negative for malignancy     E.  Prostate, left mid, biopsy   - Prostatic adenocarcinoma, acinar type, Wolfgang's grade 4 + 5 = 9, grade   group is 5, number of cores involved   is 1 of 1, total surface area involved is 75 %,perineural invasion is   identified. Percentage of grade 4 = 90%,   percentage of grade 5 = 10% (Please see comment)     F.  Prostate, left apex, biopsy   - Prostatic adenocarcinoma, acinar type, Wolfgang's grade 4 + 5 = 9, grade   group is 5, number of cores involved   is 1 of 1, total surface area involved is 90 %,perineural invasion is   identified. Percentage of grade 4 = 95%,   percentage of grade 5 = 5% (Please see comment)     G.  Prostate, right lateral base, biopsy   - Benign prostatic tissue. Negative for malignancy     H.  Prostate, right lateral mid, biopsy   - Benign prostatic tissue. Negative for malignancy     I.  Prostate, right lateral apex, biopsy   -Focal high grade prostatic intraepithelial neoplasia. Negative for   invasive malignancy.     J.  Prostate, right base, biopsy   - Benign prostatic tissue. Negative for malignancy     K.  Prostate, right mid, biopsy   - Benign prostatic tissue. Negative for malignan.cy     L.  Prostate, right apex, biopsy   - Focal high-grade prostatic intraepithelial neoplasia. Negative for   invasive malignancy.     M. Prostate,  left [place] lesion x3, biopsy-   - Prostatic adenocarcinoma, acinar type, Davenport's grade 4 + 5 = 9, grade   group is 5, number of cores involved   is 3 of 3, total surface area involved is 95 %,perineural invasion is   identified. Percentage of grade 4 = 95%,   percentage of grade 5 = 5% (Please see comment)     RALP PATHOLOGY  FINAL DIAGNOSIS:   A.  Specimen type, Procedure:  Prostate gland and attached bilateral   seminal vesicles, robotic assisted   radical prostatectomy with seminal vesicles     Histologic type:  Acinar     Histologic Grade Group and Davenport Score:  WHO Grade Group 5;  [place]   score 9 (5+4),  Tertiary pattern grade   3.     Percentage of Pattern 4 in Davenport score - Approximately 40%     Tumor Quantitation:   Adenocarcinoma present in 16 of 24 blocks of tissue   examined     Extraprostatic Extension:  Not identified     Urinary Bladder Neck Invasion:  Not identified     Seminal Vesicle Invasion:  Not identified     Lymphovascular Invasin:  Not identified     Perineural Invasion:  Multiple foci present     Margins:  Uninvolved     Treatment Effect:   - No known presurgical therapy     Regional Lymph Nodes:  Seven bilateral pelvic lymph nodes negative for   metastatic tumor, specimen [id number]     Pathologic Stage Classification:  pT2  pN0  pM - Not applicable     B.  Bilateral pelvic lymph nodes, dissection:   - Seven benign lymph nodes, each negative for metastatic tumor.     IMAGING:  All pertinent imaging reviewed:    All imaging studies reviewed by me.  I personally reviewed these imaging films.  A formal report from radiology will follow.    FINDINGS:  Size: 3.5 x 4.8 x 3.8 cm  Volume: 33.2 mL  Hemorrhage: Absent  Peripheral zone: Heterogeneous on T2-weighted images. Suspicious  lesions as detailed below.  Transition zone: Nonenlarged. Transition zone nodules which are  circumscribed or mostly encapsulated without diffusion restriction.   PI-RADS 2.  No highly suspicious  nodules.     Lesion(s) in rank order of severity (highest score- to lowest score,  then by size)      Lesion 1:  Location: Left apex peripheral zone at the 5-6 o'clock position  relative to the urethra. Series 5 image 62.   Additional prostate regions involved: There is abutment of the  external urethral sphincter.  Size: 28 mm  T2 description: Moderate hypointense  T2 numerical assessment: 5  DWI description: Hypointense on ADC and hyperintense on high b-value  DWI  DWI numerical assessment: 5  DCE assessment: Positive    Prostate margin: Capsular abutment 6-15 mm with focal bulging. The  mass is also extending anteriorly and abutting the external urinary  sphincter (series 17 image 18) as well as extending superiorly is a  linear band invading the inferomedial bilateral seminal vesicles  (series 17 image 10).  Lesion overall PI-RADS category: 5     Neurovascular bundles: No neurovascular bundle involvement by  malignancy.  Seminal vesicles: Both seminal vesicles are involved by malignancy.  Lymph nodes: No lymph node involvement  Bones: No suspicious lesions  Other pelvic organs: No additional findings.                                                                   IMPRESSION:  1. Based on the most suspicious abnormality, this exam is  characterized as PIRADS 5 - Clinically significant cancer is highly  likely to be present.  The most suspicious abnormality is located at  the left apex peripheral zone at the 5-6 o'clock position relative to  the urethra and there is capsular bulging indicating moderately high  suspicion of minimal extraprostatic extension. The mass is also  extending anteriorly and abutting the external urinary sphincter as  well as extending superiorly and invading the inferomedial seminal  vesicles.  2. No suspicious adenopathy or evidence of pelvic metastases.    ASSESSMENT and PLAN  60 year old man with PSA 30.50 and [place] 5+4=9 prostate cancer with MRI showing PIRADs 5 lesion with high  suspicion of extra-prostatic extension and possible SV invasion. Now s/p RALP/BPLND on 12/14/20 with pT2N0 disease, margins negative. Having Erectile dysfunction     Prostate cancer  -We discussed that his PSA today was 0.08.  We discussed the implications of this and that this indicates some level of persistent disease  -I again reviewed his pathology report from surgery which was reassuring  -We will plan for follow-up in 3 months with a PSA prior  -We did discuss possible referral to radiation oncology at this point, however he prefers 3-month follow-up with repeat PSA  -He is doing very well from a urine control standpoint    Erectile dysfunction  -He did have some baseline erectile dysfunction with prior use of sildenafil  -We discussed using Cialis 5 mg daily and prescription sent to his pharmacy    Time spent: 15 minutes spent on the date of the encounter doing chart review, history and exam, documentation and further activities as noted above.     Dov Tellez MD   Urology  [facility]  [facility] Phone: [phone]  [facility] Phone: [phone]

## 2021-03-26 NOTE — NURSING NOTE
"Chief Complaint   Patient presents with     Prostate Cancer     Patient here today for SD PSA and Exam       Blood pressure 110/60, pulse 51, height 1.676 m (5' 6\"), weight 67.1 kg (148 lb), SpO2 100 %. Body mass index is 23.89 kg/m .    Patient Active Problem List   Diagnosis     CARDIOVASCULAR SCREENING; LDL GOAL LESS THAN 160     Erectile dysfunction     GERD (gastroesophageal reflux disease)     Advanced directives, counseling/discussion     Bilateral low back pain with right-sided sciatica     Prostate cancer (H)       Allergies   Allergen Reactions     Amoxicillin Diarrhea and GI Disturbance       Current Outpatient Medications   Medication Sig Dispense Refill     cholecalciferol 25 MCG (1000 UT) TABS Take 1 tablet by mouth daily       multivitamin w/minerals (MULTI-VITAMIN) tablet Take 1 tablet by mouth daily ([employer] Immune formula)       tadalafil (CIALIS) 5 MG tablet Take 1 tablet (5 mg) by mouth daily Do not use with nitroglycerin, terazosin or doxazosin. 90 tablet 3     docusate sodium (COLACE) 100 MG tablet Take 1 tablet (100 mg) by mouth daily (Patient not taking: Reported on 12/23/2020) 60 tablet 1     oxyCODONE (ROXICODONE) 5 MG tablet Take 1 tablet (5 mg) by mouth every 6 hours as needed for breakthrough pain (Patient not taking: Reported on 12/23/2020) 9 tablet 0       Social History     Tobacco Use     Smoking status: Never Smoker     Smokeless tobacco: Never Used   Substance Use Topics     Alcohol use: No     Drug use: No         PSA done today   PSA 0.08  NO UA DONE TODAY    Tammi Son COLLETTE  3/26/2021  3:26 PM       "

## 2021-03-26 NOTE — LETTER
"3/26/2021       RE: Sowmya Villavicencio  [address] [address]     Dear Colleague,    Thank you for referring your patient, Sowmya Villavicenico, to the [facility] at [facility]. Please see a copy of my visit note below.    [place]  CHIEF COMPLAINT   It was my pleasure to see Ingrid Villavicencio who is a 60 year old male for follow-up of prostate cancer .      HPI   Ingrid Villavicencio is a very pleasant 60 year old male    Initially seen 9/25/20:  \"Ingrid Villavicencio is a 60 year old male who is being seen for evaluation of Elevated PSA.  He had recent PSA in August 2020 2.8.  No clear records that I could find of a prior PSA.  He denies any voiding symptoms.  He denies any family history of prostate cancer.  He denies any hip or bony tenderness.\"    10/14/20:  MR- fusion biopsy    Initial PSA: 30.5  Biopsy Beaufort Score: 4 + 5 = 9  Risk Group: High  Status post RALP/BPLND on 12/14/21  Age at time of surgery: 60  Pathologic Beaufort Score: 5 + 4 = 9  Positive Margins: None  Extra Capsular Extension: None  SV Involvement: None  Pathologic Stage: pT2  Number of Lymph Nodes Removed: 7  Number of Positive Lymph Nodes: 0  Nerve status: Right nerve sparing, Left partial nerve sparing    TODAY:  Doing well with urine control  No leaking for the past 3-4 weeks  Using a pad for security at night  Having only partial erections      PHYSICAL EXAM  Patient is a 60 year old  male   Vitals: Blood pressure 110/60, pulse 51, height 1.676 m (5' 6\"), SpO2 100 %.  Body mass index is 23.4 kg/m .  General Appearance Adult:   Alert, no acute distress, oriented  HENT: throat/mouth:normal, good dentition  Lungs: no respiratory distress, or pursed lip breathing  Heart: No obvious jugular venous distension present  Abdomen: non - distended  Incisions: well healed, no hernia  Musculoskeltal: extremities normal, no peripheral edema  Skin: no suspicious lesions or " rashes  Neuro: Alert, oriented, speech and mentation normal  Psych: affect and mood normal  Gait: Normal     Component PSA PSA Diag Urologic Phys   Latest Ref Rng & Units 0 - 4 ug/L 0.00 - 4.00 ng/mL   8/17/2020 22.80 (H)    9/25/2020  30.50 (H)   3/26/2021  0.08     PATHOLOGY:  FINAL DIAGNOSIS:   A.  Prostate, left lateral base , biopsy   - Benign prostatic tissue. Negative for malignancy     B.  Prostate, left lateral mid, biopsy   - Prostatic adenocarcinoma, acinar type, [place]'s grade 3+ 3 = 6, grade   group is1, number of cores involved   is 1 of 1, total surface area involved is <5 %,perineural invasion is not   identified     C.  Prostate, left lateral apex, biopsy   - Prostatic adenocarcinoma, acinar type, [place]'s grade 4 +4 = 8, grade   group is 4, number of cores involved   is 1 of 1, total surface area involved is 40 %,perineural invasion is not   identified     D.  Prostate, left base, biopsy   - Benign prostatic tissue. Negative for malignancy     E.  Prostate, left mid, biopsy   - Prostatic adenocarcinoma, acinar type, Wolfgang's grade 4 + 5 = 9, grade   group is 5, number of cores involved   is 1 of 1, total surface area involved is 75 %,perineural invasion is   identified. Percentage of grade 4 = 90%,   percentage of grade 5 = 10% (Please see comment)     F.  Prostate, left apex, biopsy   - Prostatic adenocarcinoma, acinar type, [place]'s grade 4 + 5 = 9, grade   group is 5, number of cores involved   is 1 of 1, total surface area involved is 90 %,perineural invasion is   identified. Percentage of grade 4 = 95%,   percentage of grade 5 = 5% (Please see comment)     G.  Prostate, right lateral base, biopsy   - Benign prostatic tissue. Negative for malignancy     H.  Prostate, right lateral mid, biopsy   - Benign prostatic tissue. Negative for malignancy     I.  Prostate, right lateral apex, biopsy   -Focal high grade prostatic intraepithelial neoplasia. Negative for   invasive malignancy.     J.   Prostate, right base, biopsy   - Benign prostatic tissue. Negative for malignancy     K.  Prostate, right mid, biopsy   - Benign prostatic tissue. Negative for malignan.cy     L.  Prostate, right apex, biopsy   - Focal high-grade prostatic intraepithelial neoplasia. Negative for   invasive malignancy.     M. Prostate, left [place] lesion x3, biopsy-   - Prostatic adenocarcinoma, acinar type, Wolfgang's grade 4 + 5 = 9, grade   group is 5, number of cores involved   is 3 of 3, total surface area involved is 95 %,perineural invasion is   identified. Percentage of grade 4 = 95%,   percentage of grade 5 = 5% (Please see comment)     RALP PATHOLOGY  FINAL DIAGNOSIS:   A.  Specimen type, Procedure:  Prostate gland and attached bilateral   seminal vesicles, robotic assisted   radical prostatectomy with seminal vesicles     Histologic type:  Acinar     Histologic Grade Group and Wolfgang Score:  WHO Grade Group 5;  Wolfgang   score 9 (5+4),  Tertiary pattern grade   3.     Percentage of Pattern 4 in [place] score - Approximately 40%     Tumor Quantitation:   Adenocarcinoma present in 16 of 24 blocks of tissue   examined     Extraprostatic Extension:  Not identified     Urinary Bladder Neck Invasion:  Not identified     Seminal Vesicle Invasion:  Not identified     Lymphovascular Invasin:  Not identified     Perineural Invasion:  Multiple foci present     Margins:  Uninvolved     Treatment Effect:   - No known presurgical therapy     Regional Lymph Nodes:  Seven bilateral pelvic lymph nodes negative for   metastatic tumor, specimen [id number]     Pathologic Stage Classification:  pT2  pN0  pM - Not applicable     B.  Bilateral pelvic lymph nodes, dissection:   - Seven benign lymph nodes, each negative for metastatic tumor.     IMAGING:  All pertinent imaging reviewed:    All imaging studies reviewed by me.  I personally reviewed these imaging films.  A formal report from radiology will follow.    FINDINGS:  Size: 3.5 x 4.8 x 3.8  cm  Volume: 33.2 mL  Hemorrhage: Absent  Peripheral zone: Heterogeneous on T2-weighted images. Suspicious  lesions as detailed below.  Transition zone: Nonenlarged. Transition zone nodules which are  circumscribed or mostly encapsulated without diffusion restriction.   PI-RADS 2.  No highly suspicious nodules.     Lesion(s) in rank order of severity (highest score- to lowest score,  then by size)      Lesion 1:  Location: Left apex peripheral zone at the 5-6 o'clock position  relative to the urethra. Series 5 image 62.   Additional prostate regions involved: There is abutment of the  external urethral sphincter.  Size: 28 mm  T2 description: Moderate hypointense  T2 numerical assessment: 5  DWI description: Hypointense on ADC and hyperintense on high b-value  DWI  DWI numerical assessment: 5  DCE assessment: Positive    Prostate margin: Capsular abutment 6-15 mm with focal bulging. The  mass is also extending anteriorly and abutting the external urinary  sphincter (series 17 image 18) as well as extending superiorly is a  linear band invading the inferomedial bilateral seminal vesicles  (series 17 image 10).  Lesion overall PI-RADS category: 5     Neurovascular bundles: No neurovascular bundle involvement by  malignancy.  Seminal vesicles: Both seminal vesicles are involved by malignancy.  Lymph nodes: No lymph node involvement  Bones: No suspicious lesions  Other pelvic organs: No additional findings.                                                                   IMPRESSION:  1. Based on the most suspicious abnormality, this exam is  characterized as PIRADS 5 - Clinically significant cancer is highly  likely to be present.  The most suspicious abnormality is located at  the left apex peripheral zone at the 5-6 o'clock position relative to  the urethra and there is capsular bulging indicating moderately high  suspicion of minimal extraprostatic extension. The mass is also  extending anteriorly and abutting the  external urinary sphincter as  well as extending superiorly and invading the inferomedial seminal  vesicles.  2. No suspicious adenopathy or evidence of pelvic metastases.    ASSESSMENT and PLAN  60 year old man with PSA 30.50 and Wolfgang 5+4=9 prostate cancer with MRI showing PIRADs 5 lesion with high suspicion of extra-prostatic extension and possible SV invasion. Now s/p RALP/BPLND on 12/14/20 with pT2N0 disease, margins negative. Having Erectile dysfunction     Prostate cancer  -We discussed that his PSA today was 0.08.  We discussed the implications of this and that this indicates some level of persistent disease  -I again reviewed his pathology report from surgery which was reassuring  -We will plan for follow-up in 3 months with a PSA prior  -We did discuss possible referral to radiation oncology at this point, however he prefers 3-month follow-up with repeat PSA  -He is doing very well from a urine control standpoint    Erectile dysfunction  -He did have some baseline erectile dysfunction with prior use of sildenafil  -We discussed using Cialis 5 mg daily and prescription sent to his pharmacy    Time spent: 15 minutes spent on the date of the encounter doing chart review, history and exam, documentation and further activities as noted above.     Dov Tellez MD   Urology  [facility]  [facility] Phone: [phone]  [facility] Phone: [phone]

## 2021-04-02 ENCOUNTER — TELEPHONE (OUTPATIENT)
Dept: UROLOGY | Facility: CLINIC | Age: 61
End: 2021-04-02

## 2021-04-05 NOTE — TELEPHONE ENCOUNTER
Central Prior Authorization Team   [phone]    PA Initiation    Medication: tadalafil  Insurance Company: [employer] - Phone [phone] Fax [phone]  Pharmacy Filling the Rx: [id number] [place], [place] - [facility] AT [address]  Filling Pharmacy Phone: [phone]  Filling Pharmacy Fax: [phone]  Start Date: 4/5/2021

## 2021-04-06 ENCOUNTER — IMMUNIZATION (OUTPATIENT)
Dept: NURSING | Facility: CLINIC | Age: 61
End: 2021-04-06
Attending: INTERNAL MEDICINE
Payer: COMMERCIAL

## 2021-04-06 PROCEDURE — 0002A PR COVID VAC PFIZER DIL RECON 30 MCG/0.3 ML IM: CPT

## 2021-04-06 PROCEDURE — 91300 PR COVID VAC PFIZER DIL RECON 30 MCG/0.3 ML IM: CPT

## 2021-04-07 NOTE — TELEPHONE ENCOUNTER
Prior Authorization Not Needed per Insurance    Medication: tadalafil-PA NOT NEEDED   Insurance Company: [employer] - Phone [phone] Fax [phone]  Expected CoPay: $2.80    Pharmacy Filling the Rx: [id number] [place] [id number] [facility] [address] AT [address]  Pharmacy Notified: Yes  Patient Notified: No    Called pharmacy and pharmacy stated that PA is Not Needed and medication is covered with a quantity limit allowed. **Instructed pharmacy to notify patient when script is ready to /ship.** Pharmacy stated that they have a paid claim on medication quantity 6 for 30 day supply and will notify patient when medication is ready for . Insurance stated that PA is Not Needed and medication is covered quantity limit of 6 tablets per 30 days. **Be Advised: request for quantity 30 for 30 day supply has been Denied as noted below.**

## 2021-04-09 ENCOUNTER — MYC MEDICAL ADVICE (OUTPATIENT)
Dept: UROLOGY | Facility: CLINIC | Age: 61
End: 2021-04-09

## 2021-04-09 DIAGNOSIS — N52.31 ERECTILE DYSFUNCTION AFTER RADICAL PROSTATECTOMY: Primary | ICD-10-CM

## 2021-04-12 PROBLEM — C61 PROSTATE CANCER (H): Status: ACTIVE | Noted: 2020-10-14

## 2021-04-19 RX ORDER — TADALAFIL 5 MG/1
5 TABLET ORAL EVERY 24 HOURS
Qty: 90 TABLET | Refills: 1 | Status: SHIPPED | OUTPATIENT
Start: 2021-04-19

## 2021-04-21 ENCOUNTER — OFFICE VISIT (OUTPATIENT)
Dept: URGENT CARE | Facility: URGENT CARE | Age: 61
End: 2021-04-21
Payer: COMMERCIAL

## 2021-04-21 ENCOUNTER — OFFICE VISIT (OUTPATIENT)
Dept: OPHTHALMOLOGY | Facility: CLINIC | Age: 61
End: 2021-04-21
Payer: COMMERCIAL

## 2021-04-21 VITALS — TEMPERATURE: 97.6 F | DIASTOLIC BLOOD PRESSURE: 74 MMHG | SYSTOLIC BLOOD PRESSURE: 121 MMHG | HEART RATE: 62 BPM

## 2021-04-21 DIAGNOSIS — K60.2 ANAL FISSURE: Primary | ICD-10-CM

## 2021-04-21 DIAGNOSIS — H52.13 MYOPIA OF BOTH EYES WITH ASTIGMATISM AND PRESBYOPIA: ICD-10-CM

## 2021-04-21 DIAGNOSIS — H25.813 COMBINED FORMS OF AGE-RELATED CATARACT OF BOTH EYES: ICD-10-CM

## 2021-04-21 DIAGNOSIS — H52.4 MYOPIA OF BOTH EYES WITH ASTIGMATISM AND PRESBYOPIA: ICD-10-CM

## 2021-04-21 DIAGNOSIS — H04.123 DRY EYE SYNDROME OF BOTH EYES: Primary | ICD-10-CM

## 2021-04-21 DIAGNOSIS — H52.203 MYOPIA OF BOTH EYES WITH ASTIGMATISM AND PRESBYOPIA: ICD-10-CM

## 2021-04-21 PROCEDURE — G0463 HOSPITAL OUTPT CLINIC VISIT: HCPCS

## 2021-04-21 PROCEDURE — 99213 OFFICE O/P EST LOW 20 MIN: CPT | Performed by: PHYSICIAN ASSISTANT

## 2021-04-21 PROCEDURE — 92015 DETERMINE REFRACTIVE STATE: CPT

## 2021-04-21 PROCEDURE — 92004 COMPRE OPH EXAM NEW PT 1/>: CPT | Mod: GC | Performed by: STUDENT IN AN ORGANIZED HEALTH CARE EDUCATION/TRAINING PROGRAM

## 2021-04-21 RX ORDER — CARBOXYMETHYLCELLULOSE SODIUM 5 MG/ML
1 SOLUTION/ DROPS OPHTHALMIC 4 TIMES DAILY
Qty: 30 ML | Refills: 11 | Status: SHIPPED | OUTPATIENT
Start: 2021-04-21

## 2021-04-21 ASSESSMENT — REFRACTION_MANIFEST
OD_ADD: +2.50
OD_SPHERE: -1.25
OS_SPHERE: -0.75
OS_AXIS: 167
OS_ADD: +2.50
OD_CYLINDER: +0.75
OS_CYLINDER: +0.75
OD_AXIS: 178

## 2021-04-21 ASSESSMENT — REFRACTION_WEARINGRX
OD_ADD: +2.50
OS_AXIS: 173
OS_ADD: +2.50
OD_SPHERE: -1.25
OD_CYLINDER: +1.00
OS_SPHERE: -0.75
SPECS_TYPE: PAL
OD_AXIS: 179
OS_CYLINDER: +0.50

## 2021-04-21 ASSESSMENT — VISUAL ACUITY
OS_CC: 20/25
OS_CC+: -2+2
METHOD: SNELLEN - LINEAR
OD_CC+: -1
CORRECTION_TYPE: GLASSES
OD_CC: 20/25

## 2021-04-21 ASSESSMENT — CONF VISUAL FIELD
OD_NORMAL: 1
OS_NORMAL: 1
METHOD: COUNTING FINGERS

## 2021-04-21 ASSESSMENT — EXTERNAL EXAM - RIGHT EYE: OD_EXAM: NORMAL

## 2021-04-21 ASSESSMENT — CUP TO DISC RATIO
OD_RATIO: 0.2
OS_RATIO: 0.1

## 2021-04-21 ASSESSMENT — EXTERNAL EXAM - LEFT EYE: OS_EXAM: NORMAL

## 2021-04-21 ASSESSMENT — TONOMETRY
IOP_METHOD: TONOPEN
OS_IOP_MMHG: 13
OD_IOP_MMHG: 12

## 2021-04-21 NOTE — PROGRESS NOTES
SUBJECTIVE:  Sowmya Villavicencio is a 60 year old male who presents to the clinic today for an itchy rectum. Intermittent for last week.  Triggered by walking.  Has not tried anything to improve symptoms.  No pain, swelling, diarrhea or constipation.     Associated symptoms include: nothing.    Past Medical History:   Diagnosis Date     Gastroesophageal reflux disease      Prostate cancer (H)      Shingles     in the eye     Current Outpatient Medications   Medication Sig Dispense Refill     cholecalciferol 25 MCG (1000 UT) TABS Take 1 tablet by mouth daily       multivitamin w/minerals (MULTI-VITAMIN) tablet Take 1 tablet by mouth daily ([employer] Immun[employer] formula)       tadalafil (CIALIS) 5 MG tablet Take 1 tablet (5 mg) by mouth every 24 hours (Patient not taking: Reported on 4/21/2021) 90 tablet 1     Social History     Tobacco Use     Smoking status: Never Smoker     Smokeless tobacco: Never Used   Substance Use Topics     Alcohol use: No       ROS:  10 point ROS negative except as listed above      EXAM:   /74   Pulse 62   Temp 97.6  F (36.4  C) (Tympanic)   GENERAL: alert, no acute distress.  RECTUM: small fissure at 6 oclock position  EYES: conjunctiva clear  NEURO: Normal strength and tone, sensory exam grossly normal,  normal speech and mentation    ASSESSMENT:  (K60.2) Anal fissure  (primary encounter diagnosis)  Comment: no evidence of infection or hemmorhoids  Plan: Red flags and emergent follow up discussed, and understood by patient  Follow up with PCP if symptoms worsen or fail to improve      Patient Instructions       Patient Education     Understanding Anal Fissures  An anal fissure is a small rip or tear in the lining of the anus. The anus is the opening where stool leaves the body.  Anal fissures are common. They are sometimes confused with hemorrhoids, which can cause similar symptoms.   What causes an anal fissure?  Having constipation or straining during bowel movements is the most  common cause of an anal fissure. You can also get one from:    Diarrhea    Childbirth    Anal sex  Less commonly, a fissure may be caused by:    Inflammatory bowel disease    An anal infection    An anal tumor  Symptoms of an anal fissure  The main symptom of an anal fissure is sharp pain during a bowel movement. This pain may last a few minutes to hours after. You may also have:    Minor bleeding during or after a bowel movement    Itching or irritation around the anus    A small lump near the anus  Treatment for an anal fissure  Most anal fissures will get better with no or minor treatment. Your healthcare provider may advise:    Changes in your diet. Eating more fiber-filled foods, such as fruits and vegetables, can help keep your bowel movements regular. It can also make your stool easier to pass. So, too, can drinking more water. Your healthcare provider may also tell you to take a fiber supplement if diet changes are not enough.    Stool softeners or laxatives. These medicines can help prevent constipation.    Sitz baths. Soaking in warm water for 10 to 20 minutes a day can help ease symptoms.    Medicines. Ointments, creams, and suppositories may help ease pain and aid with healing.    Botulinum toxin injections. This treatment may be used if an anal fissure is not healing well. It can help relax the anal sphincter muscles. This may increase blood flow to the area and help healing.    Surgery. Your healthcare provider may advise surgery if the anal fissure isn t healing or keeps coming back. You ll likely have a lateral internal sphincterotomy. During this procedure, a cut is made in the anal sphincter to lower pressure inside the anus and increase blood flow. One possible complication of surgery is fecal incontinence.     When to call your healthcare provider  Call your healthcare provider right away if you have any of these:    Fever of 100.4 F (38 C) or higher, or as directed by your healthcare  provider    Symptoms that don t get better, or get worse    New symptoms   [employer] last reviewed this educational content on 1/1/2020 2000-2021 [employer]. All rights reserved. This information is not intended as a substitute for professional medical care. Always follow your healthcare professional's instructions.           Patient Education     Taking a Sitz Bath  A sitz bath is a type of therapy done by sitting in warm, shallow water. It can help soothe pain, itching, and other symptoms in the anal and genital areas. It can also help keep these areas clean if you can t take a bath or shower. Sitz is from the [place] word sitzen, which means to sit.  Why a sitz bath is done  A sitz bath helps clean and treat certain problems in the anal area, genital area, and the perineum. The perineum is the area between the anus and the vulva in women. In men, it is between the anus and the scrotum. A sitz bath helps increase blood flow to these areas and relax the muscles.  A sitz bath may be done to:    Help ease pain and itching from hemorrhoids    Help ease pain from an anal fissure    Bathe and soothe the perineum after childbirth    Clean and soothe the anal area or perineum after surgery    Ease prostate pain during prostatitis or after a procedure    Help ease period cramps    Clean the anal and genital areas if you can t take a bath or shower  How a sitz bath is done  A sitz bath can be done in 2 ways: in a bathtub or using a sitz bath bowl.  In a bathtub  To take a sitz bath in a tub:    Make sure your bathtub is clean. Fill a clean bathtub with 3 to 4 inches of warm water. In some cases, your healthcare provider may tell you to use cold water instead.    Add salt or medicine to the water if advised by your healthcare provider.    Gently lower yourself down into the bathtub and sit on the bottom of the tub. Don t get into the bath unless the water temperature is comfortable.    Hold on to a railing.  Or ask for help from a family member, friend, or caregiver if needed.  If you have a wound, the water may cause pain at first. But the pain should ease. Make sure the area that needs treating is under the water. You can bend your knees up to help expose the area that needs contact with the water.  Using a sitz bath bowl  A sitz bath bowl is a special plastic container that s placed on a toilet. You can buy this in many drugstores and medical supply stores. To take a sitz bath this way:    Lift the toilet lid and seat. Place a cleaned plastic sitz bath bowl on the rim of your toilet. Make sure the bowl is firmly in place and won t move around.    Fill the sitz bath bowl with warm water from a pitcher or other container. The water should cover your perineum. Make sure the water temperature is comfortable.    Add salt or medicine to the water if advised by your healthcare provider. Or follow the package instructions about how to fill the bowl. Some kits come with a plastic bag and tubing. This lets you stream water into the bowl and at the area of your body that needs treatment. The bowl may have a slot or hole in the back. This lets water flow out so it doesn t overflow onto the floor. If there is no hole, be careful not to fill the bowl too full.    Gently sit down on the sitz bath bowl. Hold on to a railing. Or ask for help from a family member, friend, or caregiver if needed.  If you have a wound, the water may cause pain at first, but the pain should ease. Make sure the area that needs treating is under the water.  For any type of sitz bath:  1. Sit in the water for 10 to 20 minutes.  2. Add more warm water as needed to keep the water comfortable.  3. Get up slowly from the tub or toilet. You may feel lightheaded or dizzy. Hold on to a railing. Or ask for help from a family member, friend, or caregiver if needed.  4. Gently pat your anal area, perineum, and genitals dry with a clean towel. Don t rub the  area.  5. Wash your hands. Put any ointment or cream on the area, if advised.  6. Wash the bathtub or sitz bath bowl with soap and water after each use.  7. Use a sitz bath 2 to 3 times a day, or as often as your healthcare provider advises.  When to call your healthcare provider  Call your healthcare provider right away if you have any of these:    Fever of 100.4 F (38 C) or higher, or as directed by your healthcare provider    Redness, swelling, or fluid leaking from a cut (incision) that gets worse    Pain that gets worse    Symptoms that don t get better, or get worse    New symptoms  [employer] last reviewed this educational content on 9/1/2019 2000-2021 [employer]. All rights reserved. This information is not intended as a substitute for professional medical care. Always follow your healthcare professional's instructions.

## 2021-04-21 NOTE — NURSING NOTE
Chief Complaints and History of Present Illnesses   Patient presents with     Annual Eye Exam     Chief Complaint(s) and History of Present Illness(es)     Annual Eye Exam     Laterality: both eyes    Quality: blurred vision    Context: near vision    Course: gradually worsening    Associated symptoms: tearing.  Negative for flashes, floaters and eye pain    Pain scale: 0/10              Comments     Pt feels his near vision has gradually gotten worse over last 2 years.  Complains of RE tearing more often.  Denies any flashes, floaters, or pain.  Ocular meds: None    Lluvia Fernandez OT 2:19 PM April 21, 2021

## 2021-04-21 NOTE — PATIENT INSTRUCTIONS
Patient Education     Understanding Anal Fissures  An anal fissure is a small rip or tear in the lining of the anus. The anus is the opening where stool leaves the body.  Anal fissures are common. They are sometimes confused with hemorrhoids, which can cause similar symptoms.   What causes an anal fissure?  Having constipation or straining during bowel movements is the most common cause of an anal fissure. You can also get one from:    Diarrhea    Childbirth    Anal sex  Less commonly, a fissure may be caused by:    Inflammatory bowel disease    An anal infection    An anal tumor  Symptoms of an anal fissure  The main symptom of an anal fissure is sharp pain during a bowel movement. This pain may last a few minutes to hours after. You may also have:    Minor bleeding during or after a bowel movement    Itching or irritation around the anus    A small lump near the anus  Treatment for an anal fissure  Most anal fissures will get better with no or minor treatment. Your healthcare provider may advise:    Changes in your diet. Eating more fiber-filled foods, such as fruits and vegetables, can help keep your bowel movements regular. It can also make your stool easier to pass. So, too, can drinking more water. Your healthcare provider may also tell you to take a fiber supplement if diet changes are not enough.    Stool softeners or laxatives. These medicines can help prevent constipation.    Sitz baths. Soaking in warm water for 10 to 20 minutes a day can help ease symptoms.    Medicines. Ointments, creams, and suppositories may help ease pain and aid with healing.    Botulinum toxin injections. This treatment may be used if an anal fissure is not healing well. It can help relax the anal sphincter muscles. This may increase blood flow to the area and help healing.    Surgery. Your healthcare provider may advise surgery if the anal fissure isn t healing or keeps coming back. You ll likely have a lateral internal  sphincterotomy. During this procedure, a cut is made in the anal sphincter to lower pressure inside the anus and increase blood flow. One possible complication of surgery is fecal incontinence.     When to call your healthcare provider  Call your healthcare provider right away if you have any of these:    Fever of 100.4 F (38 C) or higher, or as directed by your healthcare provider    Symptoms that don t get better, or get worse    New symptoms   [email or web address] last reviewed this educational content on 1/1/2020 2000-2021 [employer]. All rights reserved. This information is not intended as a substitute for professional medical care. Always follow your healthcare professional's instructions.           Patient Education     Taking a Sitz Bath  A sitz bath is a type of therapy done by sitting in warm, shallow water. It can help soothe pain, itching, and other symptoms in the anal and genital areas. It can also help keep these areas clean if you can t take a bath or shower. Sitz is from the [place] word sitzen, which means to sit.  Why a sitz bath is done  A sitz bath helps clean and treat certain problems in the anal area, genital area, and the perineum. The perineum is the area between the anus and the vulva in women. In men, it is between the anus and the scrotum. A sitz bath helps increase blood flow to these areas and relax the muscles.  A sitz bath may be done to:    Help ease pain and itching from hemorrhoids    Help ease pain from an anal fissure    Bathe and soothe the perineum after childbirth    Clean and soothe the anal area or perineum after surgery    Ease prostate pain during prostatitis or after a procedure    Help ease period cramps    Clean the anal and genital areas if you can t take a bath or shower  How a sitz bath is done  A sitz bath can be done in 2 ways: in a bathtub or using a sitz bath bowl.  In a bathtub  To take a sitz bath in a tub:    Make sure your bathtub is clean. Fill a clean  bathtub with 3 to 4 inches of warm water. In some cases, your healthcare provider may tell you to use cold water instead.    Add salt or medicine to the water if advised by your healthcare provider.    Gently lower yourself down into the bathtub and sit on the bottom of the tub. Don t get into the bath unless the water temperature is comfortable.    Hold on to a railing. Or ask for help from a family member, friend, or caregiver if needed.  If you have a wound, the water may cause pain at first. But the pain should ease. Make sure the area that needs treating is under the water. You can bend your knees up to help expose the area that needs contact with the water.  Using a sitz bath bowl  A sitz bath bowl is a special plastic container that s placed on a toilet. You can buy this in many drugstores and medical supply stores. To take a sitz bath this way:    Lift the toilet lid and seat. Place a cleaned plastic sitz bath bowl on the rim of your toilet. Make sure the bowl is firmly in place and won t move around.    Fill the sitz bath bowl with warm water from a pitcher or other container. The water should cover your perineum. Make sure the water temperature is comfortable.    Add salt or medicine to the water if advised by your healthcare provider. Or follow the package instructions about how to fill the bowl. Some kits come with a plastic bag and tubing. This lets you stream water into the bowl and at the area of your body that needs treatment. The bowl may have a slot or hole in the back. This lets water flow out so it doesn t overflow onto the floor. If there is no hole, be careful not to fill the bowl too full.    Gently sit down on the sitz bath bowl. Hold on to a railing. Or ask for help from a family member, friend, or caregiver if needed.  If you have a wound, the water may cause pain at first, but the pain should ease. Make sure the area that needs treating is under the water.  For any type of sitz  bath:  1. Sit in the water for 10 to 20 minutes.  2. Add more warm water as needed to keep the water comfortable.  3. Get up slowly from the tub or toilet. You may feel lightheaded or dizzy. Hold on to a railing. Or ask for help from a family member, friend, or caregiver if needed.  4. Gently pat your anal area, perineum, and genitals dry with a clean towel. Don t rub the area.  5. Wash your hands. Put any ointment or cream on the area, if advised.  6. Wash the bathtub or sitz bath bowl with soap and water after each use.  7. Use a sitz bath 2 to 3 times a day, or as often as your healthcare provider advises.  When to call your healthcare provider  Call your healthcare provider right away if you have any of these:    Fever of 100.4 F (38 C) or higher, or as directed by your healthcare provider    Redness, swelling, or fluid leaking from a cut (incision) that gets worse    Pain that gets worse    Symptoms that don t get better, or get worse    New symptoms  [employer] last reviewed this educational content on 9/1/2019 2000-2021 [employer]. All rights reserved. This information is not intended as a substitute for professional medical care. Always follow your healthcare professional's instructions.

## 2021-04-26 ENCOUNTER — TELEPHONE (OUTPATIENT)
Dept: FAMILY MEDICINE | Facility: CLINIC | Age: 61
End: 2021-04-26

## 2021-04-26 NOTE — TELEPHONE ENCOUNTER
PCP see below    Pt was at [facility] [place]21/21    Was not Rx'd anything for anal fissures     Home care instructions given were to change diet/take stool softeners, do sitz, baths    Pt asking what he can try for anal itching related to this? Would a OTC med/hydrocortisone help?    Please advise     Kerry GTZ RN

## 2021-04-26 NOTE — TELEPHONE ENCOUNTER
Patient went to Urgent Care for anal fissure.  Still having a lot of anal itching.  Please call as soon as possible to discuss what he can do for this.  OK to LM on VM.

## 2021-04-27 DIAGNOSIS — K60.2 ANAL FISSURE: Primary | ICD-10-CM

## 2021-04-27 RX ORDER — HYDROCORTISONE 25 MG/G
CREAM TOPICAL 2 TIMES DAILY PRN
Qty: 30 G | Refills: 0 | Status: SHIPPED | OUTPATIENT
Start: 2021-04-27 | End: 2021-06-25

## 2021-04-27 NOTE — TELEPHONE ENCOUNTER
Linda-sol cream sent to pharmacy apply to rectal area twice a day for 14 days, use SITZ bath twice daily,   Follow up if persistent symptoms.

## 2021-04-27 NOTE — TELEPHONE ENCOUNTER
Left message asking patient to call back OR review MyChart    Sent MyChart message     Kerry GTZ RN

## 2021-06-25 ENCOUNTER — OFFICE VISIT (OUTPATIENT)
Dept: UROLOGY | Facility: CLINIC | Age: 61
End: 2021-06-25
Payer: COMMERCIAL

## 2021-06-25 VITALS
WEIGHT: 145.5 LBS | HEART RATE: 67 BPM | BODY MASS INDEX: 22.84 KG/M2 | DIASTOLIC BLOOD PRESSURE: 70 MMHG | HEIGHT: 67 IN | SYSTOLIC BLOOD PRESSURE: 120 MMHG | OXYGEN SATURATION: 100 %

## 2021-06-25 DIAGNOSIS — K60.2 ANAL FISSURE: ICD-10-CM

## 2021-06-25 DIAGNOSIS — C61 PROSTATE CANCER (H): Primary | ICD-10-CM

## 2021-06-25 DIAGNOSIS — N52.31 ERECTILE DYSFUNCTION AFTER RADICAL PROSTATECTOMY: ICD-10-CM

## 2021-06-25 LAB — PSA SERPL-MCNC: 0.11 NG/ML (ref 0–4)

## 2021-06-25 PROCEDURE — 99214 OFFICE O/P EST MOD 30 MIN: CPT | Performed by: UROLOGY

## 2021-06-25 PROCEDURE — 84153 ASSAY OF PSA TOTAL: CPT | Performed by: UROLOGY

## 2021-06-25 RX ORDER — HYDROCORTISONE 25 MG/G
CREAM TOPICAL 2 TIMES DAILY PRN
Qty: 30 G | Refills: 0 | Status: SHIPPED | OUTPATIENT
Start: 2021-06-25

## 2021-06-25 ASSESSMENT — PAIN SCALES - GENERAL: PAINLEVEL: NO PAIN (0)

## 2021-06-25 ASSESSMENT — MIFFLIN-ST. JEOR: SCORE: 1418.61

## 2021-06-25 NOTE — LETTER
"6/25/2021       RE: Sowmya Villavicencio  [address] [facility] [address]     Dear Colleague,    Thank you for referring your patient, Sowmya Villavicencio, to the [facility] at [facility]. Please see a copy of my visit note below.    [place]  CHIEF COMPLAINT   It was my pleasure to see Ingrid Villavicencio who is a 60 year old male for follow-up of prostate cancer .      HPI   Ingrid Villavicencio is a very pleasant 60 year old male    Initially seen 9/25/20:  \"Ingrid Villavicencio is a 60 year old male who is being seen for evaluation of Elevated PSA.  He had recent PSA in August 2020 2.8.  No clear records that I could find of a prior PSA.  He denies any voiding symptoms.  He denies any family history of prostate cancer.  He denies any hip or bony tenderness.\"    10/14/20:  MR- fusion biopsy    Initial PSA: 30.5  Biopsy [place] Score: 4 + 5 = 9  Risk Group: High  Status post RALP/BPLND on 12/14/21  Age at time of surgery: 60  Pathologic [place] Score: 5 + 4 = 9  Positive Margins: None  Extra Capsular Extension: None  SV Involvement: None  Pathologic Stage: pT2  Number of Lymph Nodes Removed: 7  Number of Positive Lymph Nodes: 0  Nerve status: Right nerve sparing, Left partial nerve sparing    3/26/21:  Doing well with urine control  No leaking for the past 3-4 weeks  Using a pad for security at night  Having only partial erections    TODAY 6/25/21:   Doing great with urine control   No pads needed  No change in erections, partial erection without ability to penetrate  He notes severe headache with tadalafil (Cialis) 5mg daily   He has been reading and is interested in intracavernosal injections    PHYSICAL EXAM  Patient is a 60 year old  male   Vitals: Blood pressure 120/70, pulse 67, height 1.702 m (5' 7\"), weight 66 kg (145 lb 8 oz), SpO2 100 %.  Body mass index is 22.79 kg/m .  General Appearance Adult:   Alert, no acute distress, oriented  HENT: " throat/mouth:normal, good dentition  Lungs: no respiratory distress, or pursed lip breathing  Heart: No obvious jugular venous distension present  Abdomen: non - distended  Incisions: well healed, no hernia  Musculoskeltal: extremities normal, no peripheral edema  Skin: no suspicious lesions or rashes  Neuro: Alert, oriented, speech and mentation normal  Psych: affect and mood normal  Gait: Normal     Component PSA PSA Diag Urologic Phys   Latest Ref Rng & Units 0 - 4 ug/L 0.00 - 4.00 ng/mL   8/17/2020 22.80 (H)    9/25/2020  30.50 (H)   3/26/2021  0.08   6/25/2021  0.11     PATHOLOGY:  FINAL DIAGNOSIS:   A.  Prostate, left lateral base , biopsy   - Benign prostatic tissue. Negative for malignancy     B.  Prostate, left lateral mid, biopsy   - Prostatic adenocarcinoma, acinar type, [place]'s grade 3+ 3 = 6, grade   group is1, number of cores involved   is 1 of 1, total surface area involved is <5 %,perineural invasion is not   identified     C.  Prostate, left lateral apex, biopsy   - Prostatic adenocarcinoma, acinar type, Wolfgang's grade 4 +4 = 8, grade   group is 4, number of cores involved   is 1 of 1, total surface area involved is 40 %,perineural invasion is not   identified     D.  Prostate, left base, biopsy   - Benign prostatic tissue. Negative for malignancy     E.  Prostate, left mid, biopsy   - Prostatic adenocarcinoma, acinar type, [place]'s grade 4 + 5 = 9, grade   group is 5, number of cores involved   is 1 of 1, total surface area involved is 75 %,perineural invasion is   identified. Percentage of grade 4 = 90%,   percentage of grade 5 = 10% (Please see comment)     F.  Prostate, left apex, biopsy   - Prostatic adenocarcinoma, acinar type, Pember[place]'s grade 4 + 5 = 9, grade   group is 5, number of cores involved   is 1 of 1, total surface area involved is 90 %,perineural invasion is   identified. Percentage of grade 4 = 95%,   percentage of grade 5 = 5% (Please see comment)     G.  Prostate, right  lateral base, biopsy   - Benign prostatic tissue. Negative for malignancy     H.  Prostate, right lateral mid, biopsy   - Benign prostatic tissue. Negative for malignancy     I.  Prostate, right lateral apex, biopsy   -Focal high grade prostatic intraepithelial neoplasia. Negative for   invasive malignancy.     J.  Prostate, right base, biopsy   - Benign prostatic tissue. Negative for malignancy     K.  Prostate, right mid, biopsy   - Benign prostatic tissue. Negative for malignan.cy     L.  Prostate, right apex, biopsy   - Focal high-grade prostatic intraepithelial neoplasia. Negative for   invasive malignancy.     M. Prostate, left [place] lesion x3, biopsy-   - Prostatic adenocarcinoma, acinar type, Montgomery's grade 4 + 5 = 9, grade   group is 5, number of cores involved   is 3 of 3, total surface area involved is 95 %,perineural invasion is   identified. Percentage of grade 4 = 95%,   percentage of grade 5 = 5% (Please see comment)     RALP PATHOLOGY  FINAL DIAGNOSIS:   A.  Specimen type, Procedure:  Prostate gland and attached bilateral   seminal vesicles, robotic assisted   radical prostatectomy with seminal vesicles     Histologic type:  Acinar     Histologic Grade Group and [place] Score:  WHO Grade Group 5;  [place]   score 9 (5+4),  Tertiary pattern grade   3.     Percentage of Pattern 4 in Wolfgang score - Approximately 40%     Tumor Quantitation:   Adenocarcinoma present in 16 of 24 blocks of tissue   examined     Extraprostatic Extension:  Not identified     Urinary Bladder Neck Invasion:  Not identified     Seminal Vesicle Invasion:  Not identified     Lymphovascular Invasin:  Not identified     Perineural Invasion:  Multiple foci present     Margins:  Uninvolved     Treatment Effect:   - No known presurgical therapy     Regional Lymph Nodes:  Seven bilateral pelvic lymph nodes negative for   metastatic tumor, specimen [id number]     Pathologic Stage Classification:  pT2  pN0  pM - Not applicable     B.   Bilateral pelvic lymph nodes, dissection:   - Seven benign lymph nodes, each negative for metastatic tumor.     IMAGING:  All pertinent imaging reviewed:    All imaging studies reviewed by me.  I personally reviewed these imaging films.  A formal report from radiology will follow.    FINDINGS:  Size: 3.5 x 4.8 x 3.8 cm  Volume: 33.2 mL  Hemorrhage: Absent  Peripheral zone: Heterogeneous on T2-weighted images. Suspicious  lesions as detailed below.  Transition zone: Nonenlarged. Transition zone nodules which are  circumscribed or mostly encapsulated without diffusion restriction.   PI-RADS 2.  No highly suspicious nodules.     Lesion(s) in rank order of severity (highest score- to lowest score,  then by size)      Lesion 1:  Location: Left apex peripheral zone at the 5-6 o'clock position  relative to the urethra. Series 5 image 62.   Additional prostate regions involved: There is abutment of the  external urethral sphincter.  Size: 28 mm  T2 description: Moderate hypointense  T2 numerical assessment: 5  DWI description: Hypointense on ADC and hyperintense on high b-value  DWI  DWI numerical assessment: 5  DCE assessment: Positive    Prostate margin: Capsular abutment 6-15 mm with focal bulging. The  mass is also extending anteriorly and abutting the external urinary  sphincter (series 17 image 18) as well as extending superiorly is a  linear band invading the inferomedial bilateral seminal vesicles  (series 17 image 10).  Lesion overall PI-RADS category: 5     Neurovascular bundles: No neurovascular bundle involvement by  malignancy.  Seminal vesicles: Both seminal vesicles are involved by malignancy.  Lymph nodes: No lymph node involvement  Bones: No suspicious lesions  Other pelvic organs: No additional findings.                                                                   IMPRESSION:  1. Based on the most suspicious abnormality, this exam is  characterized as PIRADS 5 - Clinically significant cancer is  highly  likely to be present.  The most suspicious abnormality is located at  the left apex peripheral zone at the 5-6 o'clock position relative to  the urethra and there is capsular bulging indicating moderately high  suspicion of minimal extraprostatic extension. The mass is also  extending anteriorly and abutting the external urinary sphincter as  well as extending superiorly and invading the inferomedial seminal  vesicles.  2. No suspicious adenopathy or evidence of pelvic metastases.    ASSESSMENT and PLAN  60 year old man with PSA 30.50 and Wolfgang 5+4=9 prostate cancer with MRI showing PIRADs 5 lesion with high suspicion of extra-prostatic extension and possible SV invasion. Now s/p RALP/BPLND on 12/14/20 with pT2N0 disease, margins negative. Having Erectile dysfunction     Prostate cancer  -He is doing very well from a urinary control standpoint  -We reviewed his PSA which celeste slightly today to 0.11 from 0.08 at last visit  -We discussed the option for PSA recheck in 3 months versus consideration for early salvage radiotherapy at this point.  He is very hesitant about radiation and would like to continue to follow his PSA  -We did discuss that he does have a very high likelihood of benefiting from early salvage radiotherapy  -Plan for follow-up with me in 3 months with a PSA    Erectile dysfunction  -He did have some baseline erectile dysfunction with prior use of sildenafil  -Unfortunately he developed severe headaches from Cialis  -We discussed intracavernosal injections and he is very interested in this treatment modality  -We discussed risks and benefits and prescription sent to the pharmacy  -I will plan for a teaching visit with my PA Jamia Parada in about 4 weeks, after he has received the medication.    Time spent: 20 minutes spent on the date of the encounter doing chart review, history and exam, documentation and further activities as noted above.     Dov Tellez MD   Urology  [facility]  [place] [facility]  [facility] Phone: [phone]  [facility] Phone: [phone]

## 2021-06-25 NOTE — PROGRESS NOTES
"[facility]  CHIEF COMPLAINT   It was my pleasure to see Ingrid Villavicencio who is a 60 year old male for follow-up of prostate cancer .      HPI   Ingrid Villavicencio is a very pleasant 60 year old male    Initially seen 9/25/20:  \"Ingrid Villavicnecio is a 60 year old male who is being seen for evaluation of Elevated PSA.  He had recent PSA in August 2020 2.8.  No clear records that I could find of a prior PSA.  He denies any voiding symptoms.  He denies any family history of prostate cancer.  He denies any hip or bony tenderness.\"    10/14/20:  MR- fusion biopsy    Initial PSA: 30.5  Biopsy Wolfgang Score: 4 + 5 = 9  Risk Group: High  Status post RALP/BPLND on 12/14/21  Age at time of surgery: 60  Pathologic [place] Score: 5 + 4 = 9  Positive Margins: None  Extra Capsular Extension: None  SV Involvement: None  Pathologic Stage: pT2  Number of Lymph Nodes Removed: 7  Number of Positive Lymph Nodes: 0  Nerve status: Right nerve sparing, Left partial nerve sparing    3/26/21:  Doing well with urine control  No leaking for the past 3-4 weeks  Using a pad for security at night  Having only partial erections    TODAY 6/25/21:   Doing great with urine control   No pads needed  No change in erections, partial erection without ability to penetrate  He notes severe headache with tadalafil (Cialis) 5mg daily   He has been reading and is interested in intracavernosal injections    PHYSICAL EXAM  Patient is a 60 year old  male   Vitals: Blood pressure 120/70, pulse 67, height 1.702 m (5' 7\"), weight 66 kg (145 lb 8 oz), SpO2 100 %.  Body mass index is 22.79 kg/m .  General Appearance Adult:   Alert, no acute distress, oriented  HENT: throat/mouth:normal, good dentition  Lungs: no respiratory distress, or pursed lip breathing  Heart: No obvious jugular venous distension present  Abdomen: non - distended  Incisions: well healed, no hernia  Musculoskeltal: extremities normal, no peripheral edema  Skin: no suspicious lesions or rashes  Neuro: Alert, " oriented, speech and mentation normal  Psych: affect and mood normal  Gait: Normal     Component PSA PSA Diag Urologic Phys   Latest Ref Rng & Units 0 - 4 ug/L 0.00 - 4.00 ng/mL   8/17/2020 22.80 (H)    9/25/2020  30.50 (H)   3/26/2021  0.08   6/25/2021  0.11     PATHOLOGY:  FINAL DIAGNOSIS:   A.  Prostate, left lateral base , biopsy   - Benign prostatic tissue. Negative for malignancy     B.  Prostate, left lateral mid, biopsy   - Prostatic adenocarcinoma, acinar type, [place]'s grade 3+ 3 = 6, grade   group is1, number of cores involved   is 1 of 1, total surface area involved is <5 %,perineural invasion is not   identified     C.  Prostate, left lateral apex, biopsy   - Prostatic adenocarcinoma, acinar type, [place]'s grade 4 +4 = 8, grade   group is 4, number of cores involved   is 1 of 1, total surface area involved is 40 %,perineural invasion is not   identified     D.  Prostate, left base, biopsy   - Benign prostatic tissue. Negative for malignancy     E.  Prostate, left mid, biopsy   - Prostatic adenocarcinoma, acinar type, [place]'s grade 4 + 5 = 9, grade   group is 5, number of cores involved   is 1 of 1, total surface area involved is 75 %,perineural invasion is   identified. Percentage of grade 4 = 90%,   percentage of grade 5 = 10% (Please see comment)     F.  Prostate, left apex, biopsy   - Prostatic adenocarcinoma, acinar type, [place]'s grade 4 + 5 = 9, grade   group is 5, number of cores involved   is 1 of 1, total surface area involved is 90 %,perineural invasion is   identified. Percentage of grade 4 = 95%,   percentage of grade 5 = 5% (Please see comment)     G.  Prostate, right lateral base, biopsy   - Benign prostatic tissue. Negative for malignancy     H.  Prostate, right lateral mid, biopsy   - Benign prostatic tissue. Negative for malignancy     I.  Prostate, right lateral apex, biopsy   -Focal high grade prostatic intraepithelial neoplasia. Negative for   invasive malignancy.     J.   Prostate, right base, biopsy   - Benign prostatic tissue. Negative for malignancy     K.  Prostate, right mid, biopsy   - Benign prostatic tissue. Negative for malignan.cy     L.  Prostate, right apex, biopsy   - Focal high-grade prostatic intraepithelial neoplasia. Negative for   invasive malignancy.     M. Prostate, left [place] lesion x3, biopsy-   - Prostatic adenocarcinoma, acinar type, Wolfgang's grade 4 + 5 = 9, grade   group is 5, number of cores involved   is 3 of 3, total surface area involved is 95 %,perineural invasion is   identified. Percentage of grade 4 = 95%,   percentage of grade 5 = 5% (Please see comment)     RALP PATHOLOGY  FINAL DIAGNOSIS:   A.  Specimen type, Procedure:  Prostate gland and attached bilateral   seminal vesicles, robotic assisted   radical prostatectomy with seminal vesicles     Histologic type:  Acinar     Histologic Grade Group and Wolfgang Score:  WHO Grade Group 5;  Wolfgang   score 9 (5+4),  Tertiary pattern grade   3.     Percentage of Pattern 4 in [place] score - Approximately 40%     Tumor Quantitation:   Adenocarcinoma present in 16 of 24 blocks of tissue   examined     Extraprostatic Extension:  Not identified     Urinary Bladder Neck Invasion:  Not identified     Seminal Vesicle Invasion:  Not identified     Lymphovascular Invasin:  Not identified     Perineural Invasion:  Multiple foci present     Margins:  Uninvolved     Treatment Effect:   - No known presurgical therapy     Regional Lymph Nodes:  Seven bilateral pelvic lymph nodes negative for   metastatic tumor, specimen [id number]     Pathologic Stage Classification:  pT2  pN0  pM - Not applicable     B.  Bilateral pelvic lymph nodes, dissection:   - Seven benign lymph nodes, each negative for metastatic tumor.     IMAGING:  All pertinent imaging reviewed:    All imaging studies reviewed by me.  I personally reviewed these imaging films.  A formal report from radiology will follow.    FINDINGS:  Size: 3.5 x 4.8 x 3.8  cm  Volume: 33.2 mL  Hemorrhage: Absent  Peripheral zone: Heterogeneous on T2-weighted images. Suspicious  lesions as detailed below.  Transition zone: Nonenlarged. Transition zone nodules which are  circumscribed or mostly encapsulated without diffusion restriction.   PI-RADS 2.  No highly suspicious nodules.     Lesion(s) in rank order of severity (highest score- to lowest score,  then by size)      Lesion 1:  Location: Left apex peripheral zone at the 5-6 o'clock position  relative to the urethra. Series 5 image 62.   Additional prostate regions involved: There is abutment of the  external urethral sphincter.  Size: 28 mm  T2 description: Moderate hypointense  T2 numerical assessment: 5  DWI description: Hypointense on ADC and hyperintense on high b-value  DWI  DWI numerical assessment: 5  DCE assessment: Positive    Prostate margin: Capsular abutment 6-15 mm with focal bulging. The  mass is also extending anteriorly and abutting the external urinary  sphincter (series 17 image 18) as well as extending superiorly is a  linear band invading the inferomedial bilateral seminal vesicles  (series 17 image 10).  Lesion overall PI-RADS category: 5     Neurovascular bundles: No neurovascular bundle involvement by  malignancy.  Seminal vesicles: Both seminal vesicles are involved by malignancy.  Lymph nodes: No lymph node involvement  Bones: No suspicious lesions  Other pelvic organs: No additional findings.                                                                   IMPRESSION:  1. Based on the most suspicious abnormality, this exam is  characterized as PIRADS 5 - Clinically significant cancer is highly  likely to be present.  The most suspicious abnormality is located at  the left apex peripheral zone at the 5-6 o'clock position relative to  the urethra and there is capsular bulging indicating moderately high  suspicion of minimal extraprostatic extension. The mass is also  extending anteriorly and abutting the  external urinary sphincter as  well as extending superiorly and invading the inferomedial seminal  vesicles.  2. No suspicious adenopathy or evidence of pelvic metastases.    ASSESSMENT and PLAN  60 year old man with PSA 30.50 and Wolfgang 5+4=9 prostate cancer with MRI showing PIRADs 5 lesion with high suspicion of extra-prostatic extension and possible SV invasion. Now s/p RALP/BPLND on 12/14/20 with pT2N0 disease, margins negative. Having Erectile dysfunction     Prostate cancer  -He is doing very well from a urinary control standpoint  -We reviewed his PSA which celeste slightly today to 0.11 from 0.08 at last visit  -We discussed the option for PSA recheck in 3 months versus consideration for early salvage radiotherapy at this point.  He is very hesitant about radiation and would like to continue to follow his PSA  -We did discuss that he does have a very high likelihood of benefiting from early salvage radiotherapy  -Plan for follow-up with me in 3 months with a PSA    Erectile dysfunction  -He did have some baseline erectile dysfunction with prior use of sildenafil  -Unfortunately he developed severe headaches from Cialis  -We discussed intracavernosal injections and he is very interested in this treatment modality  -We discussed risks and benefits and prescription sent to the pharmacy  -I will plan for a teaching visit with my PA Jamia Parada in about 4 weeks, after he has received the medication.    Time spent: 20 minutes spent on the date of the encounter doing chart review, history and exam, documentation and further activities as noted above.     Dov Tellez MD   Urology  [facility]  [facility] Phone: [phone]  [facility] Phone: [phone]

## 2021-06-30 ENCOUNTER — TELEPHONE (OUTPATIENT)
Dept: UROLOGY | Facility: CLINIC | Age: 61
End: 2021-06-30

## 2021-06-30 NOTE — TELEPHONE ENCOUNTER
----- Message from Aileen Prajapati sent at 6/30/2021 10:21 AM CDT -----   Return in about 3 months (around 9/25/2021).     Follow up with Jamia Parada for [employer] teaching in 4 weeks    Follow up with me in 3 months with PSA.    [facility]

## 2021-07-05 ENCOUNTER — TELEPHONE (OUTPATIENT)
Dept: UROLOGY | Facility: CLINIC | Age: 61
End: 2021-07-05

## 2021-07-05 NOTE — TELEPHONE ENCOUNTER
----- Message from Aileen Prajapati sent at 6/30/2021 10:21 AM CDT -----   Return in about 3 months (around 9/25/2021).     Follow up with Jamia Parada for [employer] teaching in 4 weeks    Follow up with me in 3 months with PSA.    [facility]

## 2021-09-29 ENCOUNTER — OFFICE VISIT (OUTPATIENT)
Dept: UROLOGY | Facility: CLINIC | Age: 61
End: 2021-09-29
Payer: COMMERCIAL

## 2021-09-29 VITALS
HEART RATE: 66 BPM | WEIGHT: 143 LBS | OXYGEN SATURATION: 100 % | DIASTOLIC BLOOD PRESSURE: 60 MMHG | BODY MASS INDEX: 22.44 KG/M2 | SYSTOLIC BLOOD PRESSURE: 108 MMHG | HEIGHT: 67 IN

## 2021-09-29 DIAGNOSIS — R97.21 RISING PSA FOLLOWING TREATMENT FOR MALIGNANT NEOPLASM OF PROSTATE: ICD-10-CM

## 2021-09-29 DIAGNOSIS — C61 PROSTATE CANCER (H): Primary | ICD-10-CM

## 2021-09-29 DIAGNOSIS — N52.31 ERECTILE DYSFUNCTION AFTER RADICAL PROSTATECTOMY: ICD-10-CM

## 2021-09-29 LAB — PSA SERPL-MCNC: 0.24 UG/L (ref 0–4)

## 2021-09-29 PROCEDURE — 36415 COLL VENOUS BLD VENIPUNCTURE: CPT | Performed by: UROLOGY

## 2021-09-29 PROCEDURE — 84153 ASSAY OF PSA TOTAL: CPT | Performed by: UROLOGY

## 2021-09-29 PROCEDURE — 99214 OFFICE O/P EST MOD 30 MIN: CPT | Performed by: UROLOGY

## 2021-09-29 ASSESSMENT — PAIN SCALES - GENERAL: PAINLEVEL: NO PAIN (0)

## 2021-09-29 ASSESSMENT — MIFFLIN-ST. JEOR: SCORE: 1402.27

## 2021-09-29 NOTE — LETTER
"9/29/2021       RE: Sowmya Villavicencio  [address] [address]     Dear Colleague,    Thank you for referring your patient, Sowmya Villavicencio, to the [facility]OLOG[facility] at [facility]. Please see a copy of my visit note below.    [place]  CHIEF COMPLAINT   It was my pleasure to see Ingrid Villavicencio who is a 61 year old male for follow-up of prostate cancer .      HPI   Ingrid Villavicencio is a very pleasant 61 year old male    Initially seen 9/25/20:  \"Ingrid Villavicencio is a 60 year old male who is being seen for evaluation of Elevated PSA.  He had recent PSA in August 2020 2.8.  No clear records that I could find of a prior PSA.  He denies any voiding symptoms.  He denies any family history of prostate cancer.  He denies any hip or bony tenderness.\"    10/14/20:  MR- fusion biopsy    Initial PSA: 30.5  Biopsy [place] Score: 4 + 5 = 9  Risk Group: High  Status post RALP/BPLND on 12/14/21  Age at time of surgery: 60  Pathologic [place] Score: 5 + 4 = 9  Positive Margins: None  Extra Capsular Extension: None  SV Involvement: None  Pathologic Stage: pT2  Number of Lymph Nodes Removed: 7  Number of Positive Lymph Nodes: 0  Nerve status: Right nerve sparing, Left partial nerve sparing    3/26/21:  Doing well with urine control  No leaking for the past 3-4 weeks  Using a pad for security at night  Having only partial erections    6/25/21:   Doing great with urine control   No pads needed  No change in erections, partial erection without ability to penetrate  He notes severe headache with tadalafil (Cialis) 5mg daily   He has been reading and is interested in intracavernosal injections    TODAY 9/29/21:  Doing great with urine control   He has not yet received the intracavernosal injection prescription, some issue with the pharmacy  He notes that his wife has been out of town for the last 3 months and just returned    PHYSICAL EXAM  Patient is a 61 " year old  male   Vitals: There were no vitals taken for this visit.  There is no height or weight on file to calculate BMI.  General Appearance Adult:   Alert, no acute distress, oriented  HENT: throat/mouth:normal, good dentition  Lungs: no respiratory distress, or pursed lip breathing  Heart: No obvious jugular venous distension present  Abdomen: non - distended  Incisions: well healed, no hernia  Musculoskeltal: extremities normal, no peripheral edema  Skin: no suspicious lesions or rashes  Neuro: Alert, oriented, speech and mentation normal  Psych: affect and mood normal  Gait: Normal     Component PSA PSA Diag Urologic Phys   Latest Ref Rng & Units 0 - 4 ug/L 0.00 - 4.00 ng/mL   8/17/2020 22.80 (H)    9/25/2020  30.50 (H)   3/26/2021  0.08   6/25/2021  0.11     PATHOLOGY:  FINAL DIAGNOSIS:   A.  Prostate, left lateral base , biopsy   - Benign prostatic tissue. Negative for malignancy     B.  Prostate, left lateral mid, biopsy   - Prostatic adenocarcinoma, acinar type, Wolfgang's grade 3+ 3 = 6, grade   group is1, number of cores involved   is 1 of 1, total surface area involved is <5 %,perineural invasion is not   identified     C.  Prostate, left lateral apex, biopsy   - Prostatic adenocarcinoma, acinar type, Wolfgang's grade 4 +4 = 8, grade   group is 4, number of cores involved   is 1 of 1, total surface area involved is 40 %,perineural invasion is not   identified     D.  Prostate, left base, biopsy   - Benign prostatic tissue. Negative for malignancy     E.  Prostate, left mid, biopsy   - Prostatic adenocarcinoma, acinar type, [place]'s grade 4 + 5 = 9, grade   group is 5, number of cores involved   is 1 of 1, total surface area involved is 75 %,perineural invasion is   identified. Percentage of grade 4 = 90%,   percentage of grade 5 = 10% (Please see comment)     F.  Prostate, left apex, biopsy   - Prostatic adenocarcinoma, acinar type, Wolfgang's grade 4 + 5 = 9, grade   group is 5, number of cores involved    is 1 of 1, total surface area involved is 90 %,perineural invasion is   identified. Percentage of grade 4 = 95%,   percentage of grade 5 = 5% (Please see comment)     G.  Prostate, right lateral base, biopsy   - Benign prostatic tissue. Negative for malignancy     H.  Prostate, right lateral mid, biopsy   - Benign prostatic tissue. Negative for malignancy     I.  Prostate, right lateral apex, biopsy   -Focal high grade prostatic intraepithelial neoplasia. Negative for   invasive malignancy.     J.  Prostate, right base, biopsy   - Benign prostatic tissue. Negative for malignancy     K.  Prostate, right mid, biopsy   - Benign prostatic tissue. Negative for malignan.cy     L.  Prostate, right apex, biopsy   - Focal high-grade prostatic intraepithelial neoplasia. Negative for   invasive malignancy.     M. Prostate, left [place] lesion x3, biopsy-   - Prostatic adenocarcinoma, acinar type, [place]'s grade 4 + 5 = 9, grade   group is 5, number of cores involved   is 3 of 3, total surface area involved is 95 %,perineural invasion is   identified. Percentage of grade 4 = 95%,   percentage of grade 5 = 5% (Please see comment)     RALP PATHOLOGY  FINAL DIAGNOSIS:   A.  Specimen type, Procedure:  Prostate gland and attached bilateral   seminal vesicles, robotic assisted   radical prostatectomy with seminal vesicles     Histologic type:  Acinar     Histologic Grade Group and Wolfgang Score:  WHO Grade Group 5;  Wolfgang   score 9 (5+4),  Tertiary pattern grade   3.     Percentage of Pattern 4 in [place] score - Approximately 40%     Tumor Quantitation:   Adenocarcinoma present in 16 of 24 blocks of tissue   examined     Extraprostatic Extension:  Not identified     Urinary Bladder Neck Invasion:  Not identified     Seminal Vesicle Invasion:  Not identified     Lymphovascular Invasin:  Not identified     Perineural Invasion:  Multiple foci present     Margins:  Uninvolved     Treatment Effect:   - No known presurgical therapy      Regional Lymph Nodes:  Seven bilateral pelvic lymph nodes negative for   metastatic tumor, specimen [id number]     Pathologic Stage Classification:  pT2  pN0  pM - Not applicable     B.  Bilateral pelvic lymph nodes, dissection:   - Seven benign lymph nodes, each negative for metastatic tumor.     IMAGING:  All pertinent imaging reviewed:    All imaging studies reviewed by me.  I personally reviewed these imaging films.  A formal report from radiology will follow.    FINDINGS:  Size: 3.5 x 4.8 x 3.8 cm  Volume: 33.2 mL  Hemorrhage: Absent  Peripheral zone: Heterogeneous on T2-weighted images. Suspicious  lesions as detailed below.  Transition zone: Nonenlarged. Transition zone nodules which are  circumscribed or mostly encapsulated without diffusion restriction.   PI-RADS 2.  No highly suspicious nodules.     Lesion(s) in rank order of severity (highest score- to lowest score,  then by size)      Lesion 1:  Location: Left apex peripheral zone at the 5-6 o'clock position  relative to the urethra. Series 5 image 62.   Additional prostate regions involved: There is abutment of the  external urethral sphincter.  Size: 28 mm  T2 description: Moderate hypointense  T2 numerical assessment: 5  DWI description: Hypointense on ADC and hyperintense on high b-value  DWI  DWI numerical assessment: 5  DCE assessment: Positive    Prostate margin: Capsular abutment 6-15 mm with focal bulging. The  mass is also extending anteriorly and abutting the external urinary  sphincter (series 17 image 18) as well as extending superiorly is a  linear band invading the inferomedial bilateral seminal vesicles  (series 17 image 10).  Lesion overall PI-RADS category: 5     Neurovascular bundles: No neurovascular bundle involvement by  malignancy.  Seminal vesicles: Both seminal vesicles are involved by malignancy.  Lymph nodes: No lymph node involvement  Bones: No suspicious lesions  Other pelvic organs: No additional findings.                                                                    IMPRESSION:  1. Based on the most suspicious abnormality, this exam is  characterized as PIRADS 5 - Clinically significant cancer is highly  likely to be present.  The most suspicious abnormality is located at  the left apex peripheral zone at the 5-6 o'clock position relative to  the urethra and there is capsular bulging indicating moderately high  suspicion of minimal extraprostatic extension. The mass is also  extending anteriorly and abutting the external urinary sphincter as  well as extending superiorly and invading the inferomedial seminal  vesicles.  2. No suspicious adenopathy or evidence of pelvic metastases.    ASSESSMENT and PLAN  61 year old man with PSA 30.50 and [place] 5+4=9 prostate cancer with MRI showing PIRADs 5 lesion with high suspicion of extra-prostatic extension and possible SV invasion. Now s/p RALP/BPLND on 12/14/20 with pT2N0 disease, margins negative -now with rising PSA. having Erectile dysfunction     Prostate cancer  -He is doing very well from a urinary control standpoint  Reviewed his PSA history and trend which has been rising since his prostatectomy up to 0.24 today indicating a doubling time of about 3 months  -We discussed my recommendation for referral to radiation oncology and consideration of genetic testing with a decipher score to determine the benefit from ADT.  Given his high [place] score I would recommend ADT however he is very hesitant  -He is very hesitant about radiation therapy and wants to discuss with his wife  -Order for referral placed and he will be given Dr. Gan's number    Erectile dysfunction  -He is very interested in Trimix and has been having issues filling this from the pharmacy  -We will have him call the pharmacy and if needed the pharmacist can call our office  -He will need to follow-up with a teaching visit    Time spent: 20 minutes spent on the date of the encounter doing chart review, history  and exam, documentation and further activities as noted above.     Dov Tellez MD   Urology  [facility]  [facility] Phone: [phone]  [facility] Phone: [phone]

## 2021-09-29 NOTE — PATIENT INSTRUCTIONS
Prostate cancer   - There is evidence of persistent PSA  - I recommend referral to Dr. Catarino Gan in Radiation Oncology  - I recommend we consider Genetic testing of the prostate cancer specimen to determine if Androgen Deprivation Therapy would be helpful with the radiation therapy    Dov Tellez M.D.

## 2021-09-29 NOTE — PROGRESS NOTES
"[facility]  CHIEF COMPLAINT   It was my pleasure to see Ingrid Villavicencio who is a 61 year old male for follow-up of prostate cancer .      HPI   Ingrid Villavicencio is a very pleasant 61 year old male    Initially seen 9/25/20:  \"Ingrid Villavicencio is a 60 year old male who is being seen for evaluation of Elevated PSA.  He had recent PSA in August 2020 2.8.  No clear records that I could find of a prior PSA.  He denies any voiding symptoms.  He denies any family history of prostate cancer.  He denies any hip or bony tenderness.\"    10/14/20:  MR- fusion biopsy    Initial PSA: 30.5  Biopsy Wolfgang Score: 4 + 5 = 9  Risk Group: High  Status post RALP/BPLND on 12/14/21  Age at time of surgery: 60  Pathologic [place] Score: 5 + 4 = 9  Positive Margins: None  Extra Capsular Extension: None  SV Involvement: None  Pathologic Stage: pT2  Number of Lymph Nodes Removed: 7  Number of Positive Lymph Nodes: 0  Nerve status: Right nerve sparing, Left partial nerve sparing    3/26/21:  Doing well with urine control  No leaking for the past 3-4 weeks  Using a pad for security at night  Having only partial erections    6/25/21:   Doing great with urine control   No pads needed  No change in erections, partial erection without ability to penetrate  He notes severe headache with tadalafil (Cialis) 5mg daily   He has been reading and is interested in intracavernosal injections    TODAY 9/29/21:  Doing great with urine control   He has not yet received the intracavernosal injection prescription, some issue with the pharmacy  He notes that his wife has been out of town for the last 3 months and just returned    PHYSICAL EXAM  Patient is a 61 year old  male   Vitals: There were no vitals taken for this visit.  There is no height or weight on file to calculate BMI.  General Appearance Adult:   Alert, no acute distress, oriented  HENT: throat/mouth:normal, good dentition  Lungs: no respiratory distress, or pursed lip breathing  Heart: No obvious jugular " venous distension present  Abdomen: non - distended  Incisions: well healed, no hernia  Musculoskeltal: extremities normal, no peripheral edema  Skin: no suspicious lesions or rashes  Neuro: Alert, oriented, speech and mentation normal  Psych: affect and mood normal  Gait: Normal     Component PSA PSA Diag Urologic Phys   Latest Ref Rng & Units 0 - 4 ug/L 0.00 - 4.00 ng/mL   8/17/2020 22.80 (H)    9/25/2020  30.50 (H)   3/26/2021  0.08   6/25/2021  0.11     PATHOLOGY:  FINAL DIAGNOSIS:   A.  Prostate, left lateral base , biopsy   - Benign prostatic tissue. Negative for malignancy     B.  Prostate, left lateral mid, biopsy   - Prostatic adenocarcinoma, acinar type, Wolfgang's grade 3+ 3 = 6, grade   group is1, number of cores involved   is 1 of 1, total surface area involved is <5 %,perineural invasion is not   identified     C.  Prostate, left lateral apex, biopsy   - Prostatic adenocarcinoma, acinar type, Atkins's grade 4 +4 = 8, grade   group is 4, number of cores involved   is 1 of 1, total surface area involved is 40 %,perineural invasion is not   identified     D.  Prostate, left base, biopsy   - Benign prostatic tissue. Negative for malignancy     E.  Prostate, left mid, biopsy   - Prostatic adenocarcinoma, acinar type, Wolfgang's grade 4 + 5 = 9, grade   group is 5, number of cores involved   is 1 of 1, total surface area involved is 75 %,perineural invasion is   identified. Percentage of grade 4 = 90%,   percentage of grade 5 = 10% (Please see comment)     F.  Prostate, left apex, biopsy   - Prostatic adenocarcinoma, acinar type, Atkins's grade 4 + 5 = 9, grade   group is 5, number of cores involved   is 1 of 1, total surface area involved is 90 %,perineural invasion is   identified. Percentage of grade 4 = 95%,   percentage of grade 5 = 5% (Please see comment)     G.  Prostate, right lateral base, biopsy   - Benign prostatic tissue. Negative for malignancy     H.  Prostate, right lateral mid, biopsy   -  Benign prostatic tissue. Negative for malignancy     I.  Prostate, right lateral apex, biopsy   -Focal high grade prostatic intraepithelial neoplasia. Negative for   invasive malignancy.     J.  Prostate, right base, biopsy   - Benign prostatic tissue. Negative for malignancy     K.  Prostate, right mid, biopsy   - Benign prostatic tissue. Negative for malignan.cy     L.  Prostate, right apex, biopsy   - Focal high-grade prostatic intraepithelial neoplasia. Negative for   invasive malignancy.     M. Prostate, left [place] lesion x3, biopsy-   - Prostatic adenocarcinoma, acinar type, Wolfgang's grade 4 + 5 = 9, grade   group is 5, number of cores involved   is 3 of 3, total surface area involved is 95 %,perineural invasion is   identified. Percentage of grade 4 = 95%,   percentage of grade 5 = 5% (Please see comment)     RALP PATHOLOGY  FINAL DIAGNOSIS:   A.  Specimen type, Procedure:  Prostate gland and attached bilateral   seminal vesicles, robotic assisted   radical prostatectomy with seminal vesicles     Histologic type:  Acinar     Histologic Grade Group and [place] Score:  WHO Grade Group 5;  [place]   score 9 (5+4),  Tertiary pattern grade   3.     Percentage of Pattern 4 in [place] score - Approximately 40%     Tumor Quantitation:   Adenocarcinoma present in 16 of 24 blocks of tissue   examined     Extraprostatic Extension:  Not identified     Urinary Bladder Neck Invasion:  Not identified     Seminal Vesicle Invasion:  Not identified     Lymphovascular Invasin:  Not identified     Perineural Invasion:  Multiple foci present     Margins:  Uninvolved     Treatment Effect:   - No known presurgical therapy     Regional Lymph Nodes:  Seven bilateral pelvic lymph nodes negative for   metastatic tumor, specimen [id number]     Pathologic Stage Classification:  pT2  pN0  pM - Not applicable     B.  Bilateral pelvic lymph nodes, dissection:   - Seven benign lymph nodes, each negative for metastatic tumor.      IMAGING:  All pertinent imaging reviewed:    All imaging studies reviewed by me.  I personally reviewed these imaging films.  A formal report from radiology will follow.    FINDINGS:  Size: 3.5 x 4.8 x 3.8 cm  Volume: 33.2 mL  Hemorrhage: Absent  Peripheral zone: Heterogeneous on T2-weighted images. Suspicious  lesions as detailed below.  Transition zone: Nonenlarged. Transition zone nodules which are  circumscribed or mostly encapsulated without diffusion restriction.   PI-RADS 2.  No highly suspicious nodules.     Lesion(s) in rank order of severity (highest score- to lowest score,  then by size)      Lesion 1:  Location: Left apex peripheral zone at the 5-6 o'clock position  relative to the urethra. Series 5 image 62.   Additional prostate regions involved: There is abutment of the  external urethral sphincter.  Size: 28 mm  T2 description: Moderate hypointense  T2 numerical assessment: 5  DWI description: Hypointense on ADC and hyperintense on high b-value  DWI  DWI numerical assessment: 5  DCE assessment: Positive    Prostate margin: Capsular abutment 6-15 mm with focal bulging. The  mass is also extending anteriorly and abutting the external urinary  sphincter (series 17 image 18) as well as extending superiorly is a  linear band invading the inferomedial bilateral seminal vesicles  (series 17 image 10).  Lesion overall PI-RADS category: 5     Neurovascular bundles: No neurovascular bundle involvement by  malignancy.  Seminal vesicles: Both seminal vesicles are involved by malignancy.  Lymph nodes: No lymph node involvement  Bones: No suspicious lesions  Other pelvic organs: No additional findings.                                                                   IMPRESSION:  1. Based on the most suspicious abnormality, this exam is  characterized as PIRADS 5 - Clinically significant cancer is highly  likely to be present.  The most suspicious abnormality is located at  the left apex peripheral zone at the  5-6 o'clock position relative to  the urethra and there is capsular bulging indicating moderately high  suspicion of minimal extraprostatic extension. The mass is also  extending anteriorly and abutting the external urinary sphincter as  well as extending superiorly and invading the inferomedial seminal  vesicles.  2. No suspicious adenopathy or evidence of pelvic metastases.    ASSESSMENT and PLAN  61 year old man with PSA 30.50 and [place] 5+4=9 prostate cancer with MRI showing PIRADs 5 lesion with high suspicion of extra-prostatic extension and possible SV invasion. Now s/p RALP/BPLND on 12/14/20 with pT2N0 disease, margins negative -now with rising PSA. having Erectile dysfunction     Prostate cancer  -He is doing very well from a urinary control standpoint  Reviewed his PSA history and trend which has been rising since his prostatectomy up to 0.24 today indicating a doubling time of about 3 months  -We discussed my recommendation for referral to radiation oncology and consideration of genetic testing with a decipher score to determine the benefit from ADT.  Given his high [place] score I would recommend ADT however he is very hesitant  -He is very hesitant about radiation therapy and wants to discuss with his wife  -Order for referral placed and he will be given Dr. Gan's number    Erectile dysfunction  -He is very interested in Trimix and has been having issues filling this from the pharmacy  -We will have him call the pharmacy and if needed the pharmacist can call our office  -He will need to follow-up with a teaching visit    Time spent: 20 minutes spent on the date of the encounter doing chart review, history and exam, documentation and further activities as noted above.     Dov Tellez MD   Urology  [facility]  [facility] Phone: [phone]  [facility] Phone: [phone]

## 2021-09-29 NOTE — NURSING NOTE
"Chief Complaint   Patient presents with     Elevated PSA     Patiient here today for Same Day  PSA and 3 Month follow up with Mervin Tellez     Erectile Dysfunction     Patient needs to speak to Dr. Tellez about medication refill       Blood pressure 108/60, pulse 66, height 1.702 m (5' 7\"), weight 64.9 kg (143 lb), SpO2 100 %. Body mass index is 22.4 kg/m .    Patient Active Problem List   Diagnosis     CARDIOVASCULAR SCREENING; LDL GOAL LESS THAN 160     Erectile dysfunction     GERD (gastroesophageal reflux disease)     Advanced directives, counseling/discussion     Bilateral low back pain with right-sided sciatica     Prostate cancer (H)       Allergies   Allergen Reactions     Amoxicillin Diarrhea and GI Disturbance       Current Outpatient Medications   Medication Sig Dispense Refill     cholecalciferol 25 MCG (1000 UT) TABS Take 1 tablet by mouth daily        carboxymethylcellulose (CARBOXYMETHYLCELLULOSE SODIUM) 0.5 % SOLN ophthalmic solution Place 1 drop into both eyes 4 times daily (Patient not taking: Reported on 6/25/2021) 30 mL 11     COMPOUNDED NON-CONTROLLED SUBSTANCE (CMPD RX) - PHARMACY TO MIX COMPOUNDED MEDICATION TriMix #9  One vial. Syringes also  Directions  Inject 0.1cc as directed by MD garcia (Patient not taking: Reported on 9/29/2021) 1 kit 11     hydrocortisone, Perianal, (HYDROCORTISONE) 2.5 % cream Place rectally 2 times daily as needed for hemorrhoids (Patient not taking: Reported on 9/29/2021) 30 g 0     multivitamin w/minerals (MULTI-VITAMIN) tablet Take 1 tablet by mouth daily ([employer] Immune formula) (Patient not taking: Reported on 9/29/2021)       tadalafil (CIALIS) 5 MG tablet Take 1 tablet (5 mg) by mouth every 24 hours (Patient not taking: Reported on 4/21/2021) 90 tablet 1       Social History     Tobacco Use     Smoking status: Never Smoker     Smokeless tobacco: Never Used   Substance Use Topics     Alcohol use: No     Drug use: No       Tammi Son, " RMA  9/29/2021  2:38 PM

## 2021-10-03 ENCOUNTER — HEALTH MAINTENANCE LETTER (OUTPATIENT)
Age: 61
End: 2021-10-03

## 2021-10-04 ENCOUNTER — NURSE TRIAGE (OUTPATIENT)
Dept: NURSING | Facility: CLINIC | Age: 61
End: 2021-10-04

## 2021-10-04 NOTE — TELEPHONE ENCOUNTER
FUTURE VISIT INFORMATION      FUTURE VISIT INFORMATION:    Date: 10/7/2021    Time: 2PM    Location: [facility]  REFERRAL INFORMATION:    Referring provider:      Referring providers clinic:      Reason for visit/diagnosis  Ear Congestion, Referral & Records in Epic - Per Pt    RECORDS REQUESTED FROM:       Clinic name Comments Records Status Imaging Status   [facility]  10/4/2021 note from Batool Holbrook RN EPIC

## 2021-10-04 NOTE — TELEPHONE ENCOUNTER
"Patient calling with left ear symptoms.  He states he hears a \"whosh whosh, air sound\".  It is worse at night.  Denies pain or fever  Disposition is to be seen within 3 days.  Transferred to a  for an appointment.  Patient verbalized understanding and agrees with plan.     Batool Richard RN  [facility] Nurse Advisor  1:22 PM 10/4/2021    Reason for Disposition    Ear congestion present > 48 hours    Additional Information    Negative: Earache lasts > 1 hour    Negative: Pus or cloudy discharge from ear canal    Negative: Patient wants to be seen    Negative: Ear pain is the main symptom    Negative: Hearing loss (complete or partial) is the main symptom    Negative: Earwax is the main concern    Negative: Has nasal allergies and they are acting up    Protocols used: EAR - CONGESTION-A-OH  COVID 19 Nurse Triage Plan/Patient Instructions    Please be aware that novel coronavirus (COVID-19) may be circulating in the community. If you develop symptoms such as fever, cough, or SOB or if you have concerns about the presence of another infection including coronavirus (COVID-19), please contact your health care provider or visit [email or web address].     Disposition/Instructions    In-Person Visit with provider recommended. Reference Visit Selection Guide.    Thank you for taking steps to prevent the spread of this virus.  o Limit your contact with others.  o Wear a simple mask to cover your cough.  o Wash your hands well and often.    Resources    [facility]: About COVID-19: [email or web address]    CDC: What to Do If You're Sick: www.cdc.gov/coronavirus/2019-ncov/about/steps-when-sick.html    CDC: Ending Home Isolation: www.cdc.gov/coronavirus/2019-ncov/hcp/disposition-in-home-patients.html     CDC: Caring for Someone: www.cdc.gov/coronavirus/2019-ncov/if-you-are-sick/care-for-someone.html     [facility]: Interim Guidance for Hospital Discharge to Home: " www.health.[facility].[place]/diseases/coronavirus/hcp/hospdischarge.pdf    [facility] clinical trials (COVID-19 research studies): [email or web address]-trials     Below are the COVID-19 hotlines at the [facility] ([facility]). Interpreters are available.   o For health questions: Call [phone] or [phone] (7 a.m. to 7 p.m.)  o For questions about schools and childcare: Call [phone] or 1-705.138.8824 (7 a.m. to 7 p.m.)

## 2021-10-05 ENCOUNTER — NURSE TRIAGE (OUTPATIENT)
Dept: FAMILY MEDICINE | Facility: CLINIC | Age: 61
End: 2021-10-05

## 2021-10-05 DIAGNOSIS — H92.09 OTALGIA, UNSPECIFIED LATERALITY: Primary | ICD-10-CM

## 2021-10-07 ENCOUNTER — PRE VISIT (OUTPATIENT)
Dept: OTOLARYNGOLOGY | Facility: CLINIC | Age: 61
End: 2021-10-07

## 2021-10-07 ENCOUNTER — OFFICE VISIT (OUTPATIENT)
Dept: OTOLARYNGOLOGY | Facility: CLINIC | Age: 61
End: 2021-10-07
Payer: COMMERCIAL

## 2021-10-07 DIAGNOSIS — H92.09 OTALGIA, UNSPECIFIED LATERALITY: ICD-10-CM

## 2021-10-07 DIAGNOSIS — H93.8X2 CONGESTION OF LEFT EAR: Primary | ICD-10-CM

## 2021-10-07 DIAGNOSIS — Z01.10 ENCOUNTER FOR HEARING EXAMINATION WITHOUT ABNORMAL FINDINGS: ICD-10-CM

## 2021-10-07 DIAGNOSIS — J39.2 MASS OF NASOPHARYNX: ICD-10-CM

## 2021-10-07 PROCEDURE — 99203 OFFICE O/P NEW LOW 30 MIN: CPT | Mod: 25 | Performed by: OTOLARYNGOLOGY

## 2021-10-07 PROCEDURE — 31575 DIAGNOSTIC LARYNGOSCOPY: CPT | Performed by: OTOLARYNGOLOGY

## 2021-10-07 ASSESSMENT — PAIN SCALES - GENERAL: PAINLEVEL: NO PAIN (0)

## 2021-10-07 NOTE — PROGRESS NOTES
The patient presents with a history of a feeling of congestion in the left ear. The patient denies sinusitis, rhinitis, facial pain, nasal obstruction or purulent nasal discharge. The patient denies chronic or recurrent tonsillitis, chronic or recurrent pharyngitis. The patient denies otalgia, otorrhea, eustachian tube dysfunction, ear infections or dizziness. He reports that the symptoms began in the past three weeks. He does not feel like his hearing is decreased. He feels like the sensation may involved some pulsation when he lays down. He denies trauma to the head. He reports that he was swimming prior to the onset of the symptoms.       This patient is seen in consultation at the request of Dr. Sara Espitia.    All other systems were reviewed and they are either negative or they are not directly pertinent to this Otolaryngology examination.      Past Medical History:    Past Medical History:   Diagnosis Date     Gastroesophageal reflux disease      Prostate cancer (H)      Shingles     in the eye       Past Surgical History:    Past Surgical History:   Procedure Laterality Date     DAVINCI PROSTATECTOMY Bilateral 12/14/2020    Procedure: ROBOTIC ASSISTED LAPAROSCOPIC RADICAL PROSTATECTOMY WITH BILATERAL PELVIC LYMPH NODE DISSECTION;  Surgeon: Dov Tellez MD;  Location:  OR     PROSTATE SURGERY         Medications:      Current Outpatient Medications:      carboxymethylcellulose (CARBOXYMETHYLCELLULOSE SODIUM) 0.5 % SOLN ophthalmic solution, Place 1 drop into both eyes 4 times daily (Patient not taking: Reported on 6/25/2021), Disp: 30 mL, Rfl: 11     cholecalciferol 25 MCG (1000 UT) TABS, Take 1 tablet by mouth daily , Disp: , Rfl:      COMPOUNDED NON-CONTROLLED SUBSTANCE (CMPD RX) - PHARMACY TO MIX COMPOUNDED MEDICATION, TriMix #9  One vial. Syringes also  Directions  Inject 0.1cc as directed by MD garcia (Patient not taking: Reported on 9/29/2021), Disp: 1 kit, Rfl: 11     hydrocortisone, Perianal,  (HYDROCORTISONE) 2.5 % cream, Place rectally 2 times daily as needed for hemorrhoids (Patient not taking: Reported on 9/29/2021), Disp: 30 g, Rfl: 0     multivitamin w/minerals (MULTI-VITAMIN) tablet, Take 1 tablet by mouth daily ([employer] Immune formula) (Patient not taking: Reported on 9/29/2021), Disp: , Rfl:      tadalafil (CIALIS) 5 MG tablet, Take 1 tablet (5 mg) by mouth every 24 hours (Patient not taking: Reported on 4/21/2021), Disp: 90 tablet, Rfl: 1    Allergies:    Amoxicillin    Physical Examination:    The patient is a well developed, well nourished male in no apparent distress.  He is normocepahlic, atraumatic with pupils equally round and reactive to light.    Oral Cavity Examination: Normal Mucosa with no masses or lesions  Nasal Examination: Normal Mucosa with no masses or lesions  Ear Examination: Ear canals clear, tympanic membranes and middle ear spaces normal  Neurological Examination: Facial nerve function intact and symmetric  Integumentary Examination: No lesions on the skin of the head or neck  Neck Examination: No masses or lesions, no lymphadenopathy  Endocrine Examination: Normal thyroid examination  Flexible Fiberoptic Laryngoscopy:  Fullness in the nasopharynx with no specific masses, Normal base of tongue, pyriform sinuses, epiglottis, valleculae, false vocal cords, true vocal cords, and larynx.  Normal motion of the vocal cords with no lesions, masses, nodules, or polyps bilaterally.     Assessment and Plan:    The patient presents with a history of fullness in the left ear that developed in the past few weeks. He will be referred for a CT scan of the temporal bones and sinuses because he is not able to tolerate an MRI scan. He will also be referred for an Audiogram and Tympanogram. He will be seen again after the testing is completed.      PreOp Diagnosis:  Fullness in the left ear    PostOp Diagnosis: Fullness in the left ear    Procedure: Flexible Fiberoptic  Laryngoscopy    Estimated Blood Loss: None    Complications: None    Consent: The patient was informed of the possible complications and probable outcomes of the procedure and the patient gave informed consent.    Fluids: None    Drains: None    Disposition: to home    Findings: Normal nasopharynx is full with no specific masses, Normal base of tongue, pyriform sinuses, epiglottis, valleculae, false vocal cords, true vocal cords, and larynx.  Normal motion of the vocal cords with no lesions, masses, nodules, or polyps bilaterally.     Description of Procedure:    The patient was positioned on the clinic room chair. The nose was decongested and anesthetized with 2 pledgets in each nostril with  lidocaine hcl 4% and oxymetazoline hcl 0.05% topical solution. The flexible fiberoptic scope was passed into the each nostril and the nasal passages visualized. The scope was passed through the left nostril into the nasopharynx which was normal. The base of tongue and tonsil tissues were visualized and found to be normal. The vocal cords and larynx were visualized and the true vocal cords were visualized and found to be normal.        CC: Dr. Sara Espitia

## 2021-10-07 NOTE — LETTER
10/7/2021       RE: Sowmya Villavicencio  [address] [address]     Dear Colleague,    Thank you for referring your patient, Sowmya Villavicencio, to the [facility] EAR NOSE AND THROAT CLINIC [place] [facility]. Please see a copy of my visit note below.    The patient presents with a history of a feeling of congestion in the left ear. The patient denies sinusitis, rhinitis, facial pain, nasal obstruction or purulent nasal discharge. The patient denies chronic or recurrent tonsillitis, chronic or recurrent pharyngitis. The patient denies otalgia, otorrhea, eustachian tube dysfunction, ear infections or dizziness. He reports that the symptoms began in the past three weeks. He does not feel like his hearing is decreased. He feels like the sensation may involved some pulsation when he lays down. He denies trauma to the head. He reports that he was swimming prior to the onset of the symptoms.       This patient is seen in consultation at the request of Dr. Sara Espitia.    All other systems were reviewed and they are either negative or they are not directly pertinent to this Otolaryngology examination.      Past Medical History:    Past Medical History:   Diagnosis Date     Gastroesophageal reflux disease      Prostate cancer (H)      Shingles     in the eye       Past Surgical History:    Past Surgical History:   Procedure Laterality Date     DAVINCI PROSTATECTOMY Bilateral 12/14/2020    Procedure: ROBOTIC ASSISTED LAPAROSCOPIC RADICAL PROSTATECTOMY WITH BILATERAL PELVIC LYMPH NODE DISSECTION;  Surgeon: Dov Tellez MD;  Location:  OR     PROSTATE SURGERY         Medications:      Current Outpatient Medications:      carboxymethylcellulose (CARBOXYMETHYLCELLULOSE SODIUM) 0.5 % SOLN ophthalmic solution, Place 1 drop into both eyes 4 times daily (Patient not taking: Reported on 6/25/2021), Disp: 30 mL, Rfl: 11     cholecalciferol 25 MCG (1000  UT) TABS, Take 1 tablet by mouth daily , Disp: , Rfl:      COMPOUNDED NON-CONTROLLED SUBSTANCE (CMPD RX) - PHARMACY TO MIX COMPOUNDED MEDICATION, TriMix #9  One vial. Syringes also  Directions  Inject 0.1cc as directed by MD garcia (Patient not taking: Reported on 9/29/2021), Disp: 1 kit, Rfl: 11     hydrocortisone, Perianal, (HYDROCORTISONE) 2.5 % cream, Place rectally 2 times daily as needed for hemorrhoids (Patient not taking: Reported on 9/29/2021), Disp: 30 g, Rfl: 0     multivitamin w/minerals (MULTI-VITAMIN) tablet, Take 1 tablet by mouth daily ([employer] Immune formula) (Patient not taking: Reported on 9/29/2021), Disp: , Rfl:      tadalafil (CIALIS) 5 MG tablet, Take 1 tablet (5 mg) by mouth every 24 hours (Patient not taking: Reported on 4/21/2021), Disp: 90 tablet, Rfl: 1    Allergies:    Amoxicillin    Physical Examination:    The patient is a well developed, well nourished male in no apparent distress.  He is normocepahlic, atraumatic with pupils equally round and reactive to light.    Oral Cavity Examination: Normal Mucosa with no masses or lesions  Nasal Examination: Normal Mucosa with no masses or lesions  Ear Examination: Ear canals clear, tympanic membranes and middle ear spaces normal  Neurological Examination: Facial nerve function intact and symmetric  Integumentary Examination: No lesions on the skin of the head or neck  Neck Examination: No masses or lesions, no lymphadenopathy  Endocrine Examination: Normal thyroid examination  Flexible Fiberoptic Laryngoscopy:  Fullness in the nasopharynx with no specific masses, Normal base of tongue, pyriform sinuses, epiglottis, valleculae, false vocal cords, true vocal cords, and larynx.  Normal motion of the vocal cords with no lesions, masses, nodules, or polyps bilaterally.     Assessment and Plan:    The patient presents with a history of fullness in the left ear that developed in the past few weeks. He will be referred for a CT scan of the  temporal bones and sinuses because he is not able to tolerate an MRI scan. He will also be referred for an Audiogram and Tympanogram. He will be seen again after the testing is completed.      PreOp Diagnosis:  Fullness in the left ear    PostOp Diagnosis: Fullness in the left ear    Procedure: Flexible Fiberoptic Laryngoscopy    Estimated Blood Loss: None    Complications: None    Consent: The patient was informed of the possible complications and probable outcomes of the procedure and the patient gave informed consent.    Fluids: None    Drains: None    Disposition: to home    Findings: Normal nasopharynx is full with no specific masses, Normal base of tongue, pyriform sinuses, epiglottis, valleculae, false vocal cords, true vocal cords, and larynx.  Normal motion of the vocal cords with no lesions, masses, nodules, or polyps bilaterally.     Description of Procedure:    The patient was positioned on the clinic room chair. The nose was decongested and anesthetized with 2 pledgets in each nostril with  lidocaine hcl 4% and oxymetazoline hcl 0.05% topical solution. The flexible fiberoptic scope was passed into the each nostril and the nasal passages visualized. The scope was passed through the left nostril into the nasopharynx which was normal. The base of tongue and tonsil tissues were visualized and found to be normal. The vocal cords and larynx were visualized and the true vocal cords were visualized and found to be normal.        CC: Dr. Sara Espitia      Again, thank you for allowing me to participate in the care of your patient.      Sincerely,    Hermann Tabares MD

## 2021-10-07 NOTE — PATIENT INSTRUCTIONS
1. You were seen in the ENT Clinic today by Dr. Tabares.  If you have any questions or concerns after your appointment, please call   - Option 1: ENT Clinic: [phone]   - Option 2: Toña (Dr. Tabares's Nurse): [phone]         Ayesha(Dr. Tabares's Nurse): [phone]    2.   Plan to return to clinic after CT with hearing test before    3. CT temporal bones and sinuses    Toña Singh LPN  [facility] - Otolaryngology      The patient presents with a history of fullness in the left ear that developed in the past few weeks. He will be referred for a CT scan of the temporal bones and sinuses because he is not able to tolerate an MRI scan. He will also be referred for an Audiogram and Tympanogram. He will be seen again after the testing is completed.

## 2021-10-15 ENCOUNTER — ANCILLARY PROCEDURE (OUTPATIENT)
Dept: CT IMAGING | Facility: CLINIC | Age: 61
End: 2021-10-15
Attending: OTOLARYNGOLOGY
Payer: COMMERCIAL

## 2021-10-15 ENCOUNTER — OFFICE VISIT (OUTPATIENT)
Dept: AUDIOLOGY | Facility: CLINIC | Age: 61
End: 2021-10-15
Payer: COMMERCIAL

## 2021-10-15 DIAGNOSIS — H93.12 TINNITUS OF LEFT EAR: Primary | ICD-10-CM

## 2021-10-15 DIAGNOSIS — H93.8X2 CONGESTION OF LEFT EAR: ICD-10-CM

## 2021-10-15 PROCEDURE — 92557 COMPREHENSIVE HEARING TEST: CPT | Performed by: AUDIOLOGIST

## 2021-10-15 PROCEDURE — 70486 CT MAXILLOFACIAL W/O DYE: CPT | Mod: GC | Performed by: STUDENT IN AN ORGANIZED HEALTH CARE EDUCATION/TRAINING PROGRAM

## 2021-10-15 PROCEDURE — 92565 STENGER TEST PURE TONE: CPT | Performed by: AUDIOLOGIST

## 2021-10-15 PROCEDURE — 92550 TYMPANOMETRY & REFLEX THRESH: CPT | Performed by: AUDIOLOGIST

## 2021-10-15 PROCEDURE — 70480 CT ORBIT/EAR/FOSSA W/O DYE: CPT | Mod: GC | Performed by: STUDENT IN AN ORGANIZED HEALTH CARE EDUCATION/TRAINING PROGRAM

## 2021-10-15 NOTE — PROGRESS NOTES
"AUDIOLOGY REPORT    SUBJECTIVE:  Sowmya Villavicencio is a 61 year old male who was seen in Audiology at the [facility] for audiologic evaluation, referred by Hermann Tabares.  The patient reports onset of episodic \"wooshing\" left tinnitus and episodic \"drumming\" left tinnitus approximately three weeks ago with \"drumming\" tinnitus occurring only at night. The patient reports one instance of head trauma in  2012 or 2013 when the patient fell and hit the back of his head; the patient reports he was seen in Emergency Room and confirms he did not experience any lasting issues. The patient also reports he had shingles (right side) around 2012 that did effect his right vision, although vision testing completed after difficulties began did not indicate abnormal findings. The patient denies hearing loss, asymmetry for hearing between ears, other ear related symptoms, dizziness, history of noise exposure, history of chronic ear issues or ear surgeries or history of hearing loss in the family.     OBJECTIVE:  Fall Risk Screen:  1. Have you fallen two or more times in the past year? No  2. Have you fallen and had an injury in the past year? No    Abuse Screening:  Do you feel unsafe at home or work/school? No  Do you feel threatened by someone? No  Does anyone try to keep you from having contact with others, or doing things outside of your home? No  Physical signs of abuse present? No    Otoscopic exam indicates ears are clear of cerumen bilaterally     Pure Tone Thresholds assessed using conventional audiometry with good  reliability from 250-8000 Hz bilaterally using circumaural headphones and reassessed using insert earphones      RIGHT:  Thresholds within normal limits, no air-bone gaps present    LEFT:    Thresholds within normal limits, 10-15 dB air-bone gaps present 5493-4988 Hz  *5-10 dB asymmetry noted most frequencies, left ear poorer    Tympanogram:    RIGHT: " normal eardrum mobility    LEFT:   normal eardrum mobility    Reflexes (reported by stimulus ear):  RIGHT: Ipsilateral is present at normal levels  RIGHT: Contralateral is present at elevated levels  LEFT:   Ipsilateral is present at normal levels  LEFT:   Contralateral is present at normal levels    Speech Reception Threshold:    RIGHT: 15 dB HL    LEFT:   20 dB HL  Word Recognition Score:     RIGHT: 100% at 50 dB HL using NU-6 recorded word list.    LEFT:   100% at 55 dB HL using NU-6 recorded word list.    ASSESSMENT:  Hearing within normal limits bilaterally with slight asymmetry between ears most frequencies (left ear poorer) and with conductive component noted 8281-9110 Hz. Today s results were discussed with the patient in detail.     PLAN:    The patient is scheduled to undergo a CT scan in this facility today and is scheduled to follow-up with Dr Tabares in the near future.  Please call this clinic with questions regarding these results or recommendations.      Sujatha Silvestre.  Licensed Audiologist  [place] #[id number]

## 2021-10-21 ENCOUNTER — TELEPHONE (OUTPATIENT)
Dept: FAMILY MEDICINE | Facility: CLINIC | Age: 61
End: 2021-10-21

## 2021-10-21 NOTE — TELEPHONE ENCOUNTER
Pt called reporting ongoing anal itching when he walks. Hydrocortisone 2.5 cream gives short relief. He would like to see PCP. Advised he call tomorrow morning to schedule same day visit. Pt verbalized agreement with plan.

## 2021-10-22 ENCOUNTER — OFFICE VISIT (OUTPATIENT)
Dept: FAMILY MEDICINE | Facility: CLINIC | Age: 61
End: 2021-10-22
Payer: COMMERCIAL

## 2021-10-22 VITALS
DIASTOLIC BLOOD PRESSURE: 67 MMHG | RESPIRATION RATE: 16 BRPM | SYSTOLIC BLOOD PRESSURE: 112 MMHG | HEART RATE: 66 BPM | TEMPERATURE: 97.7 F | BODY MASS INDEX: 23.23 KG/M2 | OXYGEN SATURATION: 100 % | WEIGHT: 148 LBS | HEIGHT: 67 IN

## 2021-10-22 DIAGNOSIS — K60.2 ANAL FISSURE: Primary | ICD-10-CM

## 2021-10-22 PROCEDURE — 99213 OFFICE O/P EST LOW 20 MIN: CPT | Performed by: INTERNAL MEDICINE

## 2021-10-22 RX ORDER — HYDROCORTISONE 25 MG/G
CREAM TOPICAL 2 TIMES DAILY PRN
Qty: 30 G | Refills: 0 | Status: SHIPPED | OUTPATIENT
Start: 2021-10-22

## 2021-10-22 ASSESSMENT — MIFFLIN-ST. JEOR: SCORE: 1424.95

## 2021-10-22 NOTE — PROGRESS NOTES
"  Assessment & Plan   Problem List Items Addressed This Visit     None      Visit Diagnoses     Anal fissure    -  Primary    Relevant Medications    hydrocortisone, Perianal, (HYDROCORTISONE) 2.5 % cream    Other Relevant Orders    Colorectal Surgery Referral         We discussed sitz bath, we discussed  increased fiber, we discussed doing Anusol repeat course patient is interested in seeking specialty opinion referral placed to [place]-rectal surgery.         See Patient Instructions    No follow-ups on file.    Sara Espitia MD  [facility] IVY Deng is a 61 year old who presents for the following health issues HPI     rectal concern    Burning sensation,     Patient presented for rectal evaluation of rectal itching no pain no blood discharge no discharge no bleeding episodes no GI bleed no constipation his bowels are soft.  He has tried Anusol cream in the past helped occasionally but then symptoms recurred.    Review of Systems   Constitutional, HEENT, cardiovascular, pulmonary, gi and gu systems are negative, except as otherwise noted.      Objective    /67 (BP Location: Right arm, Patient Position: Sitting, Cuff Size: Adult Regular)   Pulse 66   Temp 97.7  F (36.5  C) (Temporal)   Resp 16   Ht 1.702 m (5' 7\")   Wt 67.1 kg (148 lb)   SpO2 100%   BMI 23.18 kg/m    Body mass index is 23.18 kg/m .  Physical Exam   Patient not in distress  Rectal area could appreciate a small tiny fissure at 12:00 in the rectum, no hemorrhoids, no evidence of bleeding or perirectal infection or purulence collection            "

## 2021-11-02 NOTE — PATIENT INSTRUCTIONS
1. You were seen in the ENT Clinic today by Dr. Tabares.  If you have any questions or concerns after your appointment, please call   - Option 1: ENT Clinic: [phone]   - Option 2: Toña (Dr. Tabares's Nurse): [phone]         Ayesha(Dr. Tabares's Nurse): [phone]    2.   Plan to return to clinic after imaging    3. CTA Head    4. Ultrasound carotid    Toña Singh LPN  [facility] - Otolaryngology      The patient presents with a history of fullness in the left ear that developed in the past few weeks. He will be referred for a CT scan of the temporal bones and sinuses because he is not able to tolerate an MRI scan. His scans were normal and his Audiogram and Tympanogram are normal. He will be referred for a doppler ultrasound of the carotid arteries and a CT angiogram to assess for causes of pulsatile tinnitus.

## 2021-11-04 ENCOUNTER — TELEPHONE (OUTPATIENT)
Dept: OTOLARYNGOLOGY | Facility: CLINIC | Age: 61
End: 2021-11-04

## 2021-11-04 ENCOUNTER — OFFICE VISIT (OUTPATIENT)
Dept: OTOLARYNGOLOGY | Facility: CLINIC | Age: 61
End: 2021-11-04
Payer: COMMERCIAL

## 2021-11-04 VITALS — OXYGEN SATURATION: 100 % | TEMPERATURE: 97 F | HEART RATE: 65 BPM

## 2021-11-04 DIAGNOSIS — H93.A9 PULSATILE TINNITUS: Primary | ICD-10-CM

## 2021-11-04 PROCEDURE — 99213 OFFICE O/P EST LOW 20 MIN: CPT | Performed by: OTOLARYNGOLOGY

## 2021-11-04 ASSESSMENT — PAIN SCALES - GENERAL: PAINLEVEL: NO PAIN (0)

## 2021-11-04 NOTE — NURSING NOTE
Chief Complaint   Patient presents with     RECHECK     follow up      Pulse 65, temperature 97  F (36.1  C), SpO2 100 %.    Nando Maher LPN

## 2021-11-04 NOTE — LETTER
11/4/2021       RE: Sowmya Villavicencio  [address] [address]     Dear Colleague,    Thank you for referring your patient, Sowmya Villavicencio, to the [facility] EAR NOSE AND THROAT CLINIC [place] at [facility]. Please see a copy of my visit note below.    The patient presents with a history of a feeling of congestion in the left ear.  He reports that the symptoms began in the past three weeks. He does not feel like his hearing is decreased. He feels like the sensation may involved some pulsation when he lays down. He denies trauma to the head. He reports that he was swimming prior to the onset of the symptoms.     Her recent Audiogram and Tympanogram are reviewed with him and they demonstrate:    Normal hearing bilaterally with 100% word recognition scores and normal tympanograms.     His recent CT scan demonstrates:     Impression:   1. Right temporal bone: no effusion or focal abnormality is  identified.  2. Left temporal bone: no effusion or focal abnormality is identified.    Impression: No evidence of sinusitis.    After considering our discussion at his last visit, he does feel that the left ear tinnitus is pulsatile.       All other systems were reviewed and they are either negative or they are not directly pertinent to this Otolaryngology examination.      Past Medical History:    Past Medical History:   Diagnosis Date     Gastroesophageal reflux disease      Prostate cancer (H)      Shingles     in the eye       Past Surgical History:    Past Surgical History:   Procedure Laterality Date     DAVINCI PROSTATECTOMY Bilateral 12/14/2020    Procedure: ROBOTIC ASSISTED LAPAROSCOPIC RADICAL PROSTATECTOMY WITH BILATERAL PELVIC LYMPH NODE DISSECTION;  Surgeon: Dov Tellez MD;  Location:  OR     PROSTATE SURGERY         Medications:      Current Outpatient Medications:      carboxymethylcellulose (CARBOXYMETHYLCELLULOSE SODIUM) 0.5 %  SOLN ophthalmic solution, Place 1 drop into both eyes 4 times daily, Disp: 30 mL, Rfl: 11     cholecalciferol 25 MCG (1000 UT) TABS, Take 1 tablet by mouth daily , Disp: , Rfl:      COMPOUNDED NON-CONTROLLED SUBSTANCE (CMPD RX) - PHARMACY TO MIX COMPOUNDED MEDICATION, TriMix #9  One vial. Syringes also  Directions  Inject 0.1cc as directed by MD prn, Disp: 1 kit, Rfl: 11     hydrocortisone, Perianal, (HYDROCORTISONE) 2.5 % cream, Place rectally 2 times daily as needed for hemorrhoids, Disp: 30 g, Rfl: 0     hydrocortisone, Perianal, (HYDROCORTISONE) 2.5 % cream, Place rectally 2 times daily as needed for hemorrhoids, Disp: 30 g, Rfl: 0     multivitamin w/minerals (MULTI-VITAMIN) tablet, Take 1 tablet by mouth daily ([employer] Immune formula) , Disp: , Rfl:      tadalafil (CIALIS) 5 MG tablet, Take 1 tablet (5 mg) by mouth every 24 hours, Disp: 90 tablet, Rfl: 1    Allergies:    Amoxicillin    Physical Examination:    The patient is a well developed, well nourished male in no apparent distress.  He is normocepahlic, atraumatic with pupils equally round and reactive to light.    Oral Cavity Examination: Normal Mucosa with no masses or lesions  Nasal Examination: Normal Mucosa with no masses or lesions  Ear Examination: Ear canals clear, tympanic membranes and middle ear spaces normal  Neurological Examination: Facial nerve function intact and symmetric  Integumentary Examination: No lesions on the skin of the head or neck    Assessment and Plan:    The patient presents with a history of fullness in the left ear that developed in the past few weeks. He will be referred for a CT scan of the temporal bones and sinuses because he is not able to tolerate an MRI scan. His scans were normal and his Audiogram and Tympanogram are normal. He will be referred for a doppler ultrasound of the carotid arteries and a CT angiogram to assess for causes of pulsatile tinnitus.     CC: Dr. Sara Espitia      Again, thank you for  allowing me to participate in the care of your patient.      Sincerely,    Hermann Tabares MD

## 2021-11-04 NOTE — TELEPHONE ENCOUNTER
LUIS ALFREDO to schedule CTA head and ultrasound carotid and then a follow up with Dr. Tabares. Imaging has to be at least 1 day prior to ENT visit. Gave imaging and call center number

## 2021-11-04 NOTE — PROGRESS NOTES
The patient presents with a history of a feeling of congestion in the left ear.  He reports that the symptoms began in the past three weeks. He does not feel like his hearing is decreased. He feels like the sensation may involved some pulsation when he lays down. He denies trauma to the head. He reports that he was swimming prior to the onset of the symptoms.     Her recent Audiogram and Tympanogram are reviewed with him and they demonstrate:    Normal hearing bilaterally with 100% word recognition scores and normal tympanograms.     His recent CT scan demonstrates:     Impression:   1. Right temporal bone: no effusion or focal abnormality is  identified.  2. Left temporal bone: no effusion or focal abnormality is identified.    Impression: No evidence of sinusitis.    After considering our discussion at his last visit, he does feel that the left ear tinnitus is pulsatile.       All other systems were reviewed and they are either negative or they are not directly pertinent to this Otolaryngology examination.      Past Medical History:    Past Medical History:   Diagnosis Date     Gastroesophageal reflux disease      Prostate cancer (H)      Shingles     in the eye       Past Surgical History:    Past Surgical History:   Procedure Laterality Date     DAVINCI PROSTATECTOMY Bilateral 12/14/2020    Procedure: ROBOTIC ASSISTED LAPAROSCOPIC RADICAL PROSTATECTOMY WITH BILATERAL PELVIC LYMPH NODE DISSECTION;  Surgeon: Dov Tellez MD;  Location: [facility] OR     PROSTATE SURGERY         Medications:      Current Outpatient Medications:      carboxymethylcellulose (CARBOXYMETHYLCELLULOSE SODIUM) 0.5 % SOLN ophthalmic solution, Place 1 drop into both eyes 4 times daily, Disp: 30 mL, Rfl: 11     cholecalciferol 25 MCG (1000 UT) TABS, Take 1 tablet by mouth daily , Disp: , Rfl:      COMPOUNDED NON-CONTROLLED SUBSTANCE (CMPD RX) - PHARMACY TO MIX COMPOUNDED MEDICATION, TriMix #9  One vial. Syringes also  Directions  Inject 0.1cc as  directed by MD prn, Disp: 1 kit, Rfl: 11     hydrocortisone, Perianal, (HYDROCORTISONE) 2.5 % cream, Place rectally 2 times daily as needed for hemorrhoids, Disp: 30 g, Rfl: 0     hydrocortisone, Perianal, (HYDROCORTISONE) 2.5 % cream, Place rectally 2 times daily as needed for hemorrhoids, Disp: 30 g, Rfl: 0     multivitamin w/minerals (MULTI-VITAMIN) tablet, Take 1 tablet by mouth daily ([employer] Immune formula) , Disp: , Rfl:      tadalafil (CIALIS) 5 MG tablet, Take 1 tablet (5 mg) by mouth every 24 hours, Disp: 90 tablet, Rfl: 1    Allergies:    Amoxicillin    Physical Examination:    The patient is a well developed, well nourished male in no apparent distress.  He is normocepahlic, atraumatic with pupils equally round and reactive to light.    Oral Cavity Examination: Normal Mucosa with no masses or lesions  Nasal Examination: Normal Mucosa with no masses or lesions  Ear Examination: Ear canals clear, tympanic membranes and middle ear spaces normal  Neurological Examination: Facial nerve function intact and symmetric  Integumentary Examination: No lesions on the skin of the head or neck    Assessment and Plan:    The patient presents with a history of fullness in the left ear that developed in the past few weeks. He will be referred for a CT scan of the temporal bones and sinuses because he is not able to tolerate an MRI scan. His scans were normal and his Audiogram and Tympanogram are normal. He will be referred for a doppler ultrasound of the carotid arteries and a CT angiogram to assess for causes of pulsatile tinnitus.     CC: Dr. Sara Espitia

## 2021-11-13 PROBLEM — C61 PROSTATE CANCER (H): Status: RESOLVED | Noted: 2020-10-14 | Resolved: 2021-11-13

## 2021-11-13 NOTE — PROGRESS NOTES
DISCHARGE REPORT    Sowmya did not return for further treatment after the last visit on 2/19/21.   Current status is unknown.  Please see information below for last known relevant information.  Patient seen for 3 visits.    SUBJECTIVE  Subjective changes noted by patient:  The pt has been consistent with his exercises. He was sore from his side steps exercises. The day time he is not leaking at all. He only notes leaking urine at night in his sleep only after drinking acidic drinks prior to bed. He notes that when he does not do that he will not leak urine at night. He reports that is the only time he is leaking urine.   .  Current pain level is  .     Previous pain level was   .   Changes in function: (See Goal flowsheet attached for changes in current functional level)  Adverse reaction to treatment or activity: None    OBJECTIVE  Changes noted in objective findings:   nighttime voids:1x, Pt demonstrated good form with HEP no leaking of urine. Hip strength: glute max: 5-/5 bilaterally, glute med strength: 5-/5 bilatearlly. Pt demonstrated good tra activaiton without breath holding.     ASSESSMENT/PLAN  Diagnosis: pelvic floor dysfunction   Updated problem list and treatment plan:  Patient will continue to utilize HEP for any remaining deficits.   STG/LTGs have been met or progress has been made towards goals:  Please see goal flowsheet for most current information  Assessment of Progress: current status is unknown.    Last current status: Pt is progressing as expected   Self Management Plans:  HEP  I have re-evaluated this patient and find that the nature, scope, duration and intensity of the therapy is appropriate for the medical condition of the patient.  Sowmya continues to require the following intervention to meet STG and LTG's:  HEP.    Recommendations:  Discharge with current home program.  Patient to follow up with MD as needed.    Please refer to the daily flowsheet for treatment today, total  treatment time and time spent performing 1:1 timed codes.

## 2021-11-19 ENCOUNTER — ANCILLARY PROCEDURE (OUTPATIENT)
Dept: CT IMAGING | Facility: CLINIC | Age: 61
End: 2021-11-19
Attending: OTOLARYNGOLOGY
Payer: COMMERCIAL

## 2021-11-19 ENCOUNTER — ANCILLARY PROCEDURE (OUTPATIENT)
Dept: ULTRASOUND IMAGING | Facility: CLINIC | Age: 61
End: 2021-11-19
Attending: OTOLARYNGOLOGY
Payer: COMMERCIAL

## 2021-11-19 DIAGNOSIS — H93.A9 PULSATILE TINNITUS: ICD-10-CM

## 2021-11-19 PROCEDURE — 70498 CT ANGIOGRAPHY NECK: CPT | Performed by: RADIOLOGY

## 2021-11-19 PROCEDURE — 93880 EXTRACRANIAL BILAT STUDY: CPT | Performed by: RADIOLOGY

## 2021-11-19 PROCEDURE — 70496 CT ANGIOGRAPHY HEAD: CPT | Performed by: RADIOLOGY

## 2021-11-19 RX ORDER — IOPAMIDOL 755 MG/ML
75 INJECTION, SOLUTION INTRAVASCULAR ONCE
Status: COMPLETED | OUTPATIENT
Start: 2021-11-19 | End: 2021-11-19

## 2021-11-19 RX ADMIN — IOPAMIDOL 75 ML: 755 INJECTION, SOLUTION INTRAVASCULAR at 16:11

## 2021-11-22 ENCOUNTER — TELEPHONE (OUTPATIENT)
Dept: OTOLARYNGOLOGY | Facility: CLINIC | Age: 61
End: 2021-11-22
Payer: COMMERCIAL

## 2021-11-22 DIAGNOSIS — E04.1 THYROID NODULE: Primary | ICD-10-CM

## 2021-11-22 LAB — RADIOLOGIST FLAGS: ABNORMAL

## 2021-11-22 NOTE — TELEPHONE ENCOUNTER
[facility] Call Center    Phone Message    May a detailed message be left on voicemail: yes     Reason for Call: Other: Pt calling to set up a f/u Appt with Dr. Tabares after CT imaging. Dr. Tabares's next available is 1/7/22. Pt does not want to wait that long for f/u and is frustrated because his MyChart keeps sending notifications that a test result is available and he does not know how to read them. Please call pt to discuss scheduling sooner f/u. Thank you.     Action Taken: Message routed to:  [facility] ENT    Travel Screening: Not Applicable

## 2021-11-24 NOTE — TELEPHONE ENCOUNTER
Left voicemail for patient to go over CT results. Will have RN follow up on Friday when back in clinic.    Kendra Lim RN on 11/24/2021 at 3:59 PM

## 2021-11-24 NOTE — TELEPHONE ENCOUNTER
Patient returned call. Discussed that imaging had an incidental finding of a thyroid nodule. Recommending that patient get a US of the thyroid. Pending those results, patient may need to see our thyroid specialists. Patient does not need to follow up with MD until after the US thyroid.    Patient in understanding and agreement to plan and has no further questions at this time.    Kendra Lim RN on 11/24/2021 at 4:17 PM

## 2021-11-26 NOTE — PATIENT INSTRUCTIONS
1. You were seen in the ENT Clinic today by Dr. Tabares.  If you have any questions or concerns after your appointment, please call   - Option 1: ENT Clinic: [phone]   - Option 2: Toña (Dr. Tabares's Nurse): [phone]         Ayesha(Dr. Tabares's Nurse): [phone]    2.   Plan to return to clinic to Dr. Nissen for a consult    3. Keep appt with Dr. Mikel Singh, [facility] Otolaryngology      His recent CT scan demonstrates:     Impression:   1. Right temporal bone: no effusion or focal abnormality is  identified.  2. Left temporal bone: no effusion or focal abnormality is identified.    Impression: No evidence of sinusitis.    He was referred for a doppler ultrasound of the carotid arteries and a CT angiogram to assess for causes of pulsatile tinnitus.    His CT angiogram demonstrates:     Impression:    1. Head CTA demonstrates no aneurysm or stenosis of the major  intracranial arteries.   2. Neck CTA demonstrates no stenosis of the major cervical arteries.    His Doppler Ultrasound of the Carotid Arteries demonstrates:     IMPRESSION:     1. RIGHT ICA: Normal.     2. LEFT ICA:  Normal.     3. Right thyroid nodule. Diagnostic thyroid ultrasound suggested for  further evaluation.    His thyroid ultrasound demonstrates:     Impression:  TR 3 nodule of the right thyroid measuring up to 2.0 cm. Recommend  follow-up in 1, 3, and 5 years, per ACR criteria.     ACR TI-RADS recommendations  TR2 (2 points) & TR1 (0 points) -No FNA or follow-up  TR3 (3 points) - FNA if ? 2.5cm, follow-up if 1.5 -2.4 cm in 1, 3 and  5 years  TR4 (4-6 points) - FNA if ? 1.5cm, follow-up if 1 -1.4 cm in 1, 2, 3  and 5 years  TR5 (?7 points) - FNA if ? 1cm, follow-up if 0.5 -0.9 cm every year  for 5 years     The patient presents with a history of fullness in the left ear that developed in the past few weeks. He was referred for a CT scan of the temporal bones and sinuses because he is not able to tolerate an  MRI scan. His scans were normal and his Audiogram and Tympanogram are normal. He was referred for a doppler ultrasound of the carotid arteries and a CT angiogram to assess for causes of pulsatile tinnitus. No cause of his pulsatile tinnitus is identified on the tests. However, he is noted to have a nodule of the thyroid gland He will follow up with Dr. Rick Nissen for further evaluation his vascular tinnitus in the left ear and with Dr. Morin regarding this thyroid nodule.

## 2021-11-29 ENCOUNTER — TELEPHONE (OUTPATIENT)
Dept: OTOLARYNGOLOGY | Facility: CLINIC | Age: 61
End: 2021-11-29
Payer: COMMERCIAL

## 2021-11-29 ENCOUNTER — ANCILLARY PROCEDURE (OUTPATIENT)
Dept: ULTRASOUND IMAGING | Facility: CLINIC | Age: 61
End: 2021-11-29
Attending: OTOLARYNGOLOGY
Payer: COMMERCIAL

## 2021-11-29 DIAGNOSIS — E04.1 THYROID NODULE: ICD-10-CM

## 2021-11-29 PROCEDURE — 76536 US EXAM OF HEAD AND NECK: CPT | Mod: GC | Performed by: RADIOLOGY

## 2021-12-01 ENCOUNTER — TELEPHONE (OUTPATIENT)
Dept: OTOLARYNGOLOGY | Facility: CLINIC | Age: 61
End: 2021-12-01
Payer: COMMERCIAL

## 2021-12-01 ENCOUNTER — TELEPHONE (OUTPATIENT)
Dept: SURGERY | Facility: CLINIC | Age: 61
End: 2021-12-01
Payer: COMMERCIAL

## 2021-12-01 NOTE — TELEPHONE ENCOUNTER
Spoke to patient and verified he still is scheduled to see Jordan tomorrow at 2:45 and he will be addressing his ear issues. Also verified his appt with  and explained that she will be addressing issues regarding the thyroid. Pt verbalized understanding of information given and appreciative of call.

## 2021-12-01 NOTE — TELEPHONE ENCOUNTER
[facility] Call Center    Phone Message    May a detailed message be left on voicemail: yes     Reason for Call: Other: pt returning a call to schedule next available with  Dr Mikel Tomlin but wanted to make sure still has Dr Tabares Appt tomorrow, please call pt back to discuss    Thank you,    Action Taken: Message routed to:  [facility] ENT    Travel Screening: Not Applicable

## 2021-12-01 NOTE — TELEPHONE ENCOUNTER
FUTURE VISIT INFORMATION      FUTURE VISIT INFORMATION:    Date: 12/13/2021    Time: 3:40PM    Location: [facility]  REFERRAL INFORMATION:    Referring provider:  Dr Hermann Tabares    Referring providers clinic:  [facility] [place]     Reason for visit/diagnosis  new thyroid, per notes per pt    RECORDS REQUESTED FROM:       Clinic name Comments Records Status Imaging Status   Imaging 11/29/2021 US thyroid   11/19/2021 CTA Head Neck   10/26/2020 NM Bone scan  Epic PACS

## 2021-12-02 ENCOUNTER — OFFICE VISIT (OUTPATIENT)
Dept: OTOLARYNGOLOGY | Facility: CLINIC | Age: 61
End: 2021-12-02
Payer: COMMERCIAL

## 2021-12-02 VITALS
HEIGHT: 67 IN | TEMPERATURE: 97.1 F | HEART RATE: 68 BPM | BODY MASS INDEX: 23.23 KG/M2 | OXYGEN SATURATION: 99 % | WEIGHT: 148 LBS

## 2021-12-02 DIAGNOSIS — E04.1 THYROID NODULE: ICD-10-CM

## 2021-12-02 DIAGNOSIS — H93.A9 PULSATILE TINNITUS: Primary | ICD-10-CM

## 2021-12-02 PROCEDURE — 99213 OFFICE O/P EST LOW 20 MIN: CPT | Performed by: OTOLARYNGOLOGY

## 2021-12-02 ASSESSMENT — MIFFLIN-ST. JEOR: SCORE: 1424.95

## 2021-12-02 ASSESSMENT — PAIN SCALES - GENERAL: PAINLEVEL: NO PAIN (0)

## 2021-12-02 NOTE — LETTER
12/2/2021       RE: Sowmya Villavicencio  [address] [id number]     Dear Colleague,    Thank you for referring your patient, Sowmya Villavicencio, to the [facility] [place] at [facility]. Please see a copy of my visit note below.    The patient presents with a history of a feeling of congestion in the left ear.  He reports that the symptoms began in the past three weeks. He does not feel like his hearing is decreased. He feels like the sensation may involved some pulsation when he lays down. He denies trauma to the head. He reports that he was swimming prior to the onset of the symptoms.     Her recent Audiogram and Tympanogram are reviewed with him and they demonstrate:    Normal hearing bilaterally with 100% word recognition scores and normal tympanograms.     His recent CT scan demonstrates:     Impression:   1. Right temporal bone: no effusion or focal abnormality is  identified.  2. Left temporal bone: no effusion or focal abnormality is identified.    Impression: No evidence of sinusitis.    He was referred for a doppler ultrasound of the carotid arteries and a CT angiogram to assess for causes of pulsatile tinnitus.    His CT angiogram demonstrates:     Impression:    1. Head CTA demonstrates no aneurysm or stenosis of the major  intracranial arteries.   2. Neck CTA demonstrates no stenosis of the major cervical arteries.    His Doppler Ultrasound of the Carotid Arteries demonstrates:     IMPRESSION:     1. RIGHT ICA: Normal.     2. LEFT ICA:  Normal.     3. Right thyroid nodule. Diagnostic thyroid ultrasound suggested for  further evaluation.    His thyroid ultrasound demonstrates:     Impression:  TR 3 nodule of the right thyroid measuring up to 2.0 cm. Recommend  follow-up in 1, 3, and 5 years, per ACR criteria.     ACR TI-RADS recommendations  TR2 (2 points) & TR1 (0 points) -No FNA or follow-up  TR3 (3  points) - FNA if ? 2.5cm, follow-up if 1.5 -2.4 cm in 1, 3 and  5 years  TR4 (4-6 points) - FNA if ? 1.5cm, follow-up if 1 -1.4 cm in 1, 2, 3  and 5 years  TR5 (?7 points) - FNA if ? 1cm, follow-up if 0.5 -0.9 cm every year  for 5 years       All other systems were reviewed and they are either negative or they are not directly pertinent to this Otolaryngology examination.      Past Medical History:    Past Medical History:   Diagnosis Date     Gastroesophageal reflux disease      Prostate cancer (H)      Shingles     in the eye       Past Surgical History:    Past Surgical History:   Procedure Laterality Date     DAVINCI PROSTATECTOMY Bilateral 12/14/2020    Procedure: ROBOTIC ASSISTED LAPAROSCOPIC RADICAL PROSTATECTOMY WITH BILATERAL PELVIC LYMPH NODE DISSECTION;  Surgeon: Dov Tellez MD;  Location: [facility] OR     PROSTATE SURGERY         Medications:      Current Outpatient Medications:      carboxymethylcellulose (CARBOXYMETHYLCELLULOSE SODIUM) 0.5 % SOLN ophthalmic solution, Place 1 drop into both eyes 4 times daily, Disp: 30 mL, Rfl: 11     cholecalciferol 25 MCG (1000 UT) TABS, Take 1 tablet by mouth daily , Disp: , Rfl:      COMPOUNDED NON-CONTROLLED SUBSTANCE (CMPD RX) - PHARMACY TO MIX COMPOUNDED MEDICATION, TriMix #9  One vial. Syringes also  Directions  Inject 0.1cc as directed by MD prn, Disp: 1 kit, Rfl: 11     hydrocortisone, Perianal, (HYDROCORTISONE) 2.5 % cream, Place rectally 2 times daily as needed for hemorrhoids, Disp: 30 g, Rfl: 0     hydrocortisone, Perianal, (HYDROCORTISONE) 2.5 % cream, Place rectally 2 times daily as needed for hemorrhoids, Disp: 30 g, Rfl: 0     multivitamin w/minerals (MULTI-VITAMIN) tablet, Take 1 tablet by mouth daily ([employer] Immune formula) , Disp: , Rfl:      tadalafil (CIALIS) 5 MG tablet, Take 1 tablet (5 mg) by mouth every 24 hours, Disp: 90 tablet, Rfl: 1    Allergies:    Amoxicillin    Physical Examination:    The patient is a well developed, well  nourished male in no apparent distress.  He is normocepahlic, atraumatic with pupils equally round and reactive to light.    Oral Cavity Examination: Normal Mucosa with no masses or lesions  Nasal Examination: Normal Mucosa with no masses or lesions  Ear Examination: Ear canals clear, tympanic membranes and middle ear spaces normal  Neurological Examination: Facial nerve function intact and symmetric  Integumentary Examination: No lesions on the skin of the head or neck    Assessment and Plan:    The patient presents with a history of fullness in the left ear that developed in the past few weeks. He was referred for a CT scan of the temporal bones and sinuses because he is not able to tolerate an MRI scan. His scans were normal and his Audiogram and Tympanogram are normal. He was referred for a doppler ultrasound of the carotid arteries and a CT angiogram to assess for causes of pulsatile tinnitus. No cause of his pulsatile tinnitus is identified on the tests. However, he is noted to have a nodule of the thyroid gland He will follow up with Dr. Rick Nissen for further evaluation his vascular tinnitus in the left ear and with Dr. Morin regarding this thyroid nodule.     CC: Dr. Sara Espitia      Again, thank you for allowing me to participate in the care of your patient.      Sincerely,    Hermann Tabares MD

## 2021-12-02 NOTE — PROGRESS NOTES
The patient presents with a history of a feeling of congestion in the left ear.  He reports that the symptoms began in the past three weeks. He does not feel like his hearing is decreased. He feels like the sensation may involved some pulsation when he lays down. He denies trauma to the head. He reports that he was swimming prior to the onset of the symptoms.     Her recent Audiogram and Tympanogram are reviewed with him and they demonstrate:    Normal hearing bilaterally with 100% word recognition scores and normal tympanograms.     His recent CT scan demonstrates:     Impression:   1. Right temporal bone: no effusion or focal abnormality is  identified.  2. Left temporal bone: no effusion or focal abnormality is identified.    Impression: No evidence of sinusitis.    He was referred for a doppler ultrasound of the carotid arteries and a CT angiogram to assess for causes of pulsatile tinnitus.    His CT angiogram demonstrates:     Impression:    1. Head CTA demonstrates no aneurysm or stenosis of the major  intracranial arteries.   2. Neck CTA demonstrates no stenosis of the major cervical arteries.    His Doppler Ultrasound of the Carotid Arteries demonstrates:     IMPRESSION:     1. RIGHT ICA: Normal.     2. LEFT ICA:  Normal.     3. Right thyroid nodule. Diagnostic thyroid ultrasound suggested for  further evaluation.    His thyroid ultrasound demonstrates:     Impression:  TR 3 nodule of the right thyroid measuring up to 2.0 cm. Recommend  follow-up in 1, 3, and 5 years, per ACR criteria.     ACR TI-RADS recommendations  TR2 (2 points) & TR1 (0 points) -No FNA or follow-up  TR3 (3 points) - FNA if ? 2.5cm, follow-up if 1.5 -2.4 cm in 1, 3 and  5 years  TR4 (4-6 points) - FNA if ? 1.5cm, follow-up if 1 -1.4 cm in 1, 2, 3  and 5 years  TR5 (?7 points) - FNA if ? 1cm, follow-up if 0.5 -0.9 cm every year  for 5 years       All other systems were reviewed and they are either negative or they are not directly  pertinent to this Otolaryngology examination.      Past Medical History:    Past Medical History:   Diagnosis Date     Gastroesophageal reflux disease      Prostate cancer (H)      Shingles     in the eye       Past Surgical History:    Past Surgical History:   Procedure Laterality Date     DAVINCI PROSTATECTOMY Bilateral 12/14/2020    Procedure: ROBOTIC ASSISTED LAPAROSCOPIC RADICAL PROSTATECTOMY WITH BILATERAL PELVIC LYMPH NODE DISSECTION;  Surgeon: Dov Tellez MD;  Location:  OR     PROSTATE SURGERY         Medications:      Current Outpatient Medications:      carboxymethylcellulose (CARBOXYMETHYLCELLULOSE SODIUM) 0.5 % SOLN ophthalmic solution, Place 1 drop into both eyes 4 times daily, Disp: 30 mL, Rfl: 11     cholecalciferol 25 MCG (1000 UT) TABS, Take 1 tablet by mouth daily , Disp: , Rfl:      COMPOUNDED NON-CONTROLLED SUBSTANCE (CMPD RX) - PHARMACY TO MIX COMPOUNDED MEDICATION, TriMix #9  One vial. Syringes also  Directions  Inject 0.1cc as directed by MD prn, Disp: 1 kit, Rfl: 11     hydrocortisone, Perianal, (HYDROCORTISONE) 2.5 % cream, Place rectally 2 times daily as needed for hemorrhoids, Disp: 30 g, Rfl: 0     hydrocortisone, Perianal, (HYDROCORTISONE) 2.5 % cream, Place rectally 2 times daily as needed for hemorrhoids, Disp: 30 g, Rfl: 0     multivitamin w/minerals (MULTI-VITAMIN) tablet, Take 1 tablet by mouth daily ([employer] Immune formula) , Disp: , Rfl:      tadalafil (CIALIS) 5 MG tablet, Take 1 tablet (5 mg) by mouth every 24 hours, Disp: 90 tablet, Rfl: 1    Allergies:    Amoxicillin    Physical Examination:    The patient is a well developed, well nourished male in no apparent distress.  He is normocepahlic, atraumatic with pupils equally round and reactive to light.    Oral Cavity Examination: Normal Mucosa with no masses or lesions  Nasal Examination: Normal Mucosa with no masses or lesions  Ear Examination: Ear canals clear, tympanic membranes and middle ear spaces  normal  Neurological Examination: Facial nerve function intact and symmetric  Integumentary Examination: No lesions on the skin of the head or neck    Assessment and Plan:    The patient presents with a history of fullness in the left ear that developed in the past few weeks. He was referred for a CT scan of the temporal bones and sinuses because he is not able to tolerate an MRI scan. His scans were normal and his Audiogram and Tympanogram are normal. He was referred for a doppler ultrasound of the carotid arteries and a CT angiogram to assess for causes of pulsatile tinnitus. No cause of his pulsatile tinnitus is identified on the tests. However, he is noted to have a nodule of the thyroid gland He will follow up with Dr. Rick Nissen for further evaluation his vascular tinnitus in the left ear and with Dr. Morin regarding this thyroid nodule.     CC: Dr. Sara Espitia

## 2021-12-02 NOTE — NURSING NOTE
"Chief Complaint   Patient presents with     RECHECK     follow up after imaging      Pulse 68, temperature 97.1  F (36.2  C), height 1.702 m (5' 7\"), weight 67.1 kg (148 lb), SpO2 99 %.    Nando Maher LPN    "

## 2021-12-02 NOTE — LETTER
12/2/2021      RE: Sowmya Villavicencio  [address] [facility] [address]       Dear Colleague,    Thank you for the opportunity to participate in the care of your patient, Sowmya Villavicencio, at the [facility]. Please see a copy of my visit note below.    The patient presents with a history of a feeling of congestion in the left ear.  He reports that the symptoms began in the past three weeks. He does not feel like his hearing is decreased. He feels like the sensation may involved some pulsation when he lays down. He denies trauma to the head. He reports that he was swimming prior to the onset of the symptoms.     Her recent Audiogram and Tympanogram are reviewed with him and they demonstrate:    Normal hearing bilaterally with 100% word recognition scores and normal tympanograms.     His recent CT scan demonstrates:     Impression:   1. Right temporal bone: no effusion or focal abnormality is  identified.  2. Left temporal bone: no effusion or focal abnormality is identified.    Impression: No evidence of sinusitis.    He was referred for a doppler ultrasound of the carotid arteries and a CT angiogram to assess for causes of pulsatile tinnitus.    His CT angiogram demonstrates:     Impression:    1. Head CTA demonstrates no aneurysm or stenosis of the major  intracranial arteries.   2. Neck CTA demonstrates no stenosis of the major cervical arteries.    His Doppler Ultrasound of the Carotid Arteries demonstrates:     IMPRESSION:     1. RIGHT ICA: Normal.     2. LEFT ICA:  Normal.     3. Right thyroid nodule. Diagnostic thyroid ultrasound suggested for  further evaluation.    His thyroid ultrasound demonstrates:     Impression:  TR 3 nodule of the right thyroid measuring up to 2.0 cm. Recommend  follow-up in 1, 3, and 5 years, per ACR criteria.     ACR TI-RADS recommendations  TR2 (2 points) & TR1 (0  points) -No FNA or follow-up  TR3 (3 points) - FNA if ? 2.5cm, follow-up if 1.5 -2.4 cm in 1, 3 and  5 years  TR4 (4-6 points) - FNA if ? 1.5cm, follow-up if 1 -1.4 cm in 1, 2, 3  and 5 years  TR5 (?7 points) - FNA if ? 1cm, follow-up if 0.5 -0.9 cm every year  for 5 years       All other systems were reviewed and they are either negative or they are not directly pertinent to this Otolaryngology examination.      Past Medical History:    Past Medical History:   Diagnosis Date     Gastroesophageal reflux disease      Prostate cancer (H)      Shingles     in the eye       Past Surgical History:    Past Surgical History:   Procedure Laterality Date     DAVINCI PROSTATECTOMY Bilateral 12/14/2020    Procedure: ROBOTIC ASSISTED LAPAROSCOPIC RADICAL PROSTATECTOMY WITH BILATERAL PELVIC LYMPH NODE DISSECTION;  Surgeon: Dov Tellez MD;  Location: [facility] OR     PROSTATE SURGERY         Medications:      Current Outpatient Medications:      carboxymethylcellulose (CARBOXYMETHYLCELLULOSE SODIUM) 0.5 % SOLN ophthalmic solution, Place 1 drop into both eyes 4 times daily, Disp: 30 mL, Rfl: 11     cholecalciferol 25 MCG (1000 UT) TABS, Take 1 tablet by mouth daily , Disp: , Rfl:      COMPOUNDED NON-CONTROLLED SUBSTANCE (CMPD RX) - PHARMACY TO MIX COMPOUNDED MEDICATION, TriMix #9  One vial. Syringes also  Directions  Inject 0.1cc as directed by MD prn, Disp: 1 kit, Rfl: 11     hydrocortisone, Perianal, (HYDROCORTISONE) 2.5 % cream, Place rectally 2 times daily as needed for hemorrhoids, Disp: 30 g, Rfl: 0     hydrocortisone, Perianal, (HYDROCORTISONE) 2.5 % cream, Place rectally 2 times daily as needed for hemorrhoids, Disp: 30 g, Rfl: 0     multivitamin w/minerals (MULTI-VITAMIN) tablet, Take 1 tablet by mouth daily ([employer] Immune formula) , Disp: , Rfl:      tadalafil (CIALIS) 5 MG tablet, Take 1 tablet (5 mg) by mouth every 24 hours, Disp: 90 tablet, Rfl: 1    Allergies:    Amoxicillin    Physical Examination:    The  patient is a well developed, well nourished male in no apparent distress.  He is normocepahlic, atraumatic with pupils equally round and reactive to light.    Oral Cavity Examination: Normal Mucosa with no masses or lesions  Nasal Examination: Normal Mucosa with no masses or lesions  Ear Examination: Ear canals clear, tympanic membranes and middle ear spaces normal  Neurological Examination: Facial nerve function intact and symmetric  Integumentary Examination: No lesions on the skin of the head or neck    Assessment and Plan:    The patient presents with a history of fullness in the left ear that developed in the past few weeks. He was referred for a CT scan of the temporal bones and sinuses because he is not able to tolerate an MRI scan. His scans were normal and his Audiogram and Tympanogram are normal. He was referred for a doppler ultrasound of the carotid arteries and a CT angiogram to assess for causes of pulsatile tinnitus. No cause of his pulsatile tinnitus is identified on the tests. However, he is noted to have a nodule of the thyroid gland He will follow up with Dr. Rick Nissen for further evaluation his vascular tinnitus in the left ear and with Dr. Morin regarding this thyroid nodule.     CC: Dr. Sara Espitia      Please do not hesitate to contact me if you have any questions/concerns.     Sincerely,     Hermann Tabares MD

## 2021-12-13 ENCOUNTER — PRE VISIT (OUTPATIENT)
Dept: OTOLARYNGOLOGY | Facility: CLINIC | Age: 61
End: 2021-12-13

## 2021-12-13 ENCOUNTER — OFFICE VISIT (OUTPATIENT)
Dept: OTOLARYNGOLOGY | Facility: CLINIC | Age: 61
End: 2021-12-13
Payer: COMMERCIAL

## 2021-12-13 VITALS
HEART RATE: 61 BPM | HEIGHT: 67 IN | WEIGHT: 154 LBS | BODY MASS INDEX: 24.17 KG/M2 | OXYGEN SATURATION: 100 % | TEMPERATURE: 96.8 F

## 2021-12-13 DIAGNOSIS — E04.1 THYROID NODULE: Primary | ICD-10-CM

## 2021-12-13 PROCEDURE — 99203 OFFICE O/P NEW LOW 30 MIN: CPT | Performed by: SURGERY

## 2021-12-13 ASSESSMENT — MIFFLIN-ST. JEOR: SCORE: 1452.17

## 2021-12-13 ASSESSMENT — PAIN SCALES - GENERAL: PAINLEVEL: NO PAIN (0)

## 2021-12-13 NOTE — NURSING NOTE
"Chief Complaint   Patient presents with     Consult     thyroid -surgial consult      Pulse 61, temperature 96.8  F (36  C), height 1.702 m (5' 7\"), weight 69.9 kg (154 lb), SpO2 100 %.  Nando Maher LPN      "

## 2021-12-13 NOTE — LETTER
"12/13/2021       RE: Sowmya Villavicencio  [address] [facility] [address]     Dear Colleague,    Thank you for referring your patient, Sowmya Villavicencio, to the [facility] EAR NOSE AND THROAT CLINIC [place] [facility]. Please see a copy of my visit note below.    SURGERY CLINIC CONSULTATION    REASON FOR CONSULTATION:  Sowmya Villavicencio was referred by   for evaluation and discussion of treatment options for thyroid nodule     HISTORY OF PRESENT ILLNESS:  Sowmya Villavicencio is a 61 year old male who was noted to have a right thyroid nodule 2.0cm (TR3). TSH WNL in 2020    The patient denies any problems with voice quality, inspiration or swallowing. No sx of hypo or hyperthyroidism. No previous h/o PTC, no previous HN XRT      REVIEW OF SYSTEMS:  ROS EXAM:10pt ROS pertinent for that noted for in HPI  Patient Active Problem List   Diagnosis     CARDIOVASCULAR SCREENING; LDL GOAL LESS THAN 160     Erectile dysfunction     GERD (gastroesophageal reflux disease)     Advanced directives, counseling/discussion       Past Surgical History:   Procedure Laterality Date     DAVINCI PROSTATECTOMY Bilateral 12/14/2020    Procedure: ROBOTIC ASSISTED LAPAROSCOPIC RADICAL PROSTATECTOMY WITH BILATERAL PELVIC LYMPH NODE DISSECTION;  Surgeon: Dov Tellez MD;  Location: [facility] OR     PROSTATE SURGERY         Allergies   Allergen Reactions     Amoxicillin Diarrhea and GI Disturbance       Medications reviewed in the EMR        Family History   Problem Relation Age of Onset     No Known Problems Mother         PHYSICAL EXAM:  Pulse 61   Temp 96.8  F (36  C)   Ht 1.702 m (5' 7\")   Wt 69.9 kg (154 lb)   SpO2 100%   BMI 24.12 kg/m      Neck: Thyroid soft small well-circumscribed right thyroid nodule. Left lobe WNL. No cervical lymphadenopathy    I personally reviewed the radiographic images and laboratory data    ASSESSMENT:   1. Thyroid nodule        PLAN: "   Based on ACR 2.0cm TR3 nodule-recommend US in 1 year. No FNA or Surgical intervention recommended at this time.    Estefania Morin MD

## 2021-12-13 NOTE — PROGRESS NOTES
"SURGERY CLINIC CONSULTATION    REASON FOR CONSULTATION:  Sowmya Villavicencio was referred by   for evaluation and discussion of treatment options for thyroid nodule     HISTORY OF PRESENT ILLNESS:  Sowmya Villavicencio is a 61 year old male who was noted to have a right thyroid nodule 2.0cm (TR3). TSH WNL in 2020    The patient denies any problems with voice quality, inspiration or swallowing. No sx of hypo or hyperthyroidism. No previous h/o PTC, no previous HN XRT      REVIEW OF SYSTEMS:  ROS EXAM:10pt ROS pertinent for that noted for in HPI  Patient Active Problem List   Diagnosis     CARDIOVASCULAR SCREENING; LDL GOAL LESS THAN 160     Erectile dysfunction     GERD (gastroesophageal reflux disease)     Advanced directives, counseling/discussion       Past Surgical History:   Procedure Laterality Date     DAVINCI PROSTATECTOMY Bilateral 12/14/2020    Procedure: ROBOTIC ASSISTED LAPAROSCOPIC RADICAL PROSTATECTOMY WITH BILATERAL PELVIC LYMPH NODE DISSECTION;  Surgeon: Dov Tellez MD;  Location: [facility] OR     PROSTATE SURGERY         Allergies   Allergen Reactions     Amoxicillin Diarrhea and GI Disturbance       Medications reviewed in the EMR        Family History   Problem Relation Age of Onset     No Known Problems Mother         PHYSICAL EXAM:  Pulse 61   Temp 96.8  F (36  C)   Ht 1.702 m (5' 7\")   Wt 69.9 kg (154 lb)   SpO2 100%   BMI 24.12 kg/m      Neck: Thyroid soft small well-circumscribed right thyroid nodule. Left lobe WNL. No cervical lymphadenopathy    I personally reviewed the radiographic images and laboratory data    ASSESSMENT:   1. Thyroid nodule        PLAN:   Based on ACR 2.0cm TR3 nodule-recommend US in 1 year. No FNA or Surgical intervention recommended at this time.    Estefania Morin MD            "

## 2021-12-22 ENCOUNTER — OFFICE VISIT (OUTPATIENT)
Dept: UROLOGY | Facility: CLINIC | Age: 61
End: 2021-12-22
Payer: COMMERCIAL

## 2021-12-22 VITALS
SYSTOLIC BLOOD PRESSURE: 130 MMHG | HEIGHT: 67 IN | BODY MASS INDEX: 22.76 KG/M2 | WEIGHT: 145 LBS | DIASTOLIC BLOOD PRESSURE: 70 MMHG

## 2021-12-22 DIAGNOSIS — C61 PROSTATE CANCER (H): Primary | ICD-10-CM

## 2021-12-22 DIAGNOSIS — R97.21 RISING PSA FOLLOWING TREATMENT FOR MALIGNANT NEOPLASM OF PROSTATE: ICD-10-CM

## 2021-12-22 DIAGNOSIS — N52.31 ERECTILE DYSFUNCTION AFTER RADICAL PROSTATECTOMY: ICD-10-CM

## 2021-12-22 LAB — PSA SERPL-MCNC: 0.26 UG/L (ref 0–4)

## 2021-12-22 PROCEDURE — 36415 COLL VENOUS BLD VENIPUNCTURE: CPT | Performed by: UROLOGY

## 2021-12-22 PROCEDURE — 84153 ASSAY OF PSA TOTAL: CPT | Performed by: UROLOGY

## 2021-12-22 PROCEDURE — 99214 OFFICE O/P EST MOD 30 MIN: CPT | Performed by: UROLOGY

## 2021-12-22 ASSESSMENT — PAIN SCALES - GENERAL: PAINLEVEL: NO PAIN (0)

## 2021-12-22 ASSESSMENT — MIFFLIN-ST. JEOR: SCORE: 1411.35

## 2021-12-22 NOTE — PROGRESS NOTES
"[facility]  CHIEF COMPLAINT   It was my pleasure to see Ingrid Villavicencio who is a 61 year old male for follow-up of prostate cancer .      HPI   Ingrid Villavicencio is a very pleasant 61 year old male    Initially seen 9/25/20:  \"Ingrid Villavicencio is a 60 year old male who is being seen for evaluation of Elevated PSA.  He had recent PSA in August 2020 2.8.  No clear records that I could find of a prior PSA.  He denies any voiding symptoms.  He denies any family history of prostate cancer.  He denies any hip or bony tenderness.\"    10/14/20:  MR- fusion biopsy    Initial PSA: 30.5  Biopsy Wolfgang Score: 4 + 5 = 9  Risk Group: High  Status post RALP/BPLND on 12/14/20  Age at time of surgery: 60  Pathologic Hedley Score: 5 + 4 = 9  Positive Margins: None  Extra Capsular Extension: None  SV Involvement: None  Pathologic Stage: pT2  Number of Lymph Nodes Removed: 7  Number of Positive Lymph Nodes: 0  Nerve status: Right nerve sparing, Left partial nerve sparing    3/26/21:  Doing well with urine control  No leaking for the past 3-4 weeks  Using a pad for security at night  Having only partial erections    6/25/21:   Doing great with urine control   No pads needed  No change in erections, partial erection without ability to penetrate  He notes severe headache with tadalafil (Cialis) 5mg daily   He has been reading and is interested in intracavernosal injections    9/29/21:  Doing great with urine control   He has not yet received the intracavernosal injection prescription, some issue with the pharmacy  He notes that his wife has been out of town for the last 3 months and just returned    TODAY 12/22/21:  Follow-up today for Trimix teaching  He has not seen Dr. Gan yet    PHYSICAL EXAM  Patient is a 61 year old  male   Vitals: Blood pressure 130/70, height 1.702 m (5' 7\"), weight 65.8 kg (145 lb).  Body mass index is 22.71 kg/m .  General Appearance Adult:   Alert, no acute distress, oriented  HENT: throat/mouth:normal, good " dentition  Lungs: no respiratory distress, or pursed lip breathing  Heart: No obvious jugular venous distension present  Abdomen: non - distended  Incisions: well healed, no hernia  Musculoskeltal: extremities normal, no peripheral edema  Skin: no suspicious lesions or rashes  Neuro: Alert, oriented, speech and mentation normal  Psych: affect and mood normal  Gait: Normal     Component PSA PSA Diag Urologic Phys   Latest Ref Rng & Units 0.00 - 4.00 ug/L 0.00 - 4.00 ng/mL   8/17/2020 22.80 (H)    9/25/2020  30.50 (H)   3/26/2021  0.08   6/25/2021  0.11   9/29/2021 0.24      PATHOLOGY:  FINAL DIAGNOSIS:   A.  Prostate, left lateral base , biopsy   - Benign prostatic tissue. Negative for malignancy     B.  Prostate, left lateral mid, biopsy   - Prostatic adenocarcinoma, acinar type, Tolar's grade 3+ 3 = 6, grade   group is1, number of cores involved   is 1 of 1, total surface area involved is <5 %,perineural invasion is not   identified     C.  Prostate, left lateral apex, biopsy   - Prostatic adenocarcinoma, acinar type, Wolfgang's grade 4 +4 = 8, grade   group is 4, number of cores involved   is 1 of 1, total surface area involved is 40 %,perineural invasion is not   identified     D.  Prostate, left base, biopsy   - Benign prostatic tissue. Negative for malignancy     E.  Prostate, left mid, biopsy   - Prostatic adenocarcinoma, acinar type, Tolar's grade 4 + 5 = 9, grade   group is 5, number of cores involved   is 1 of 1, total surface area involved is 75 %,perineural invasion is   identified. Percentage of grade 4 = 90%,   percentage of grade 5 = 10% (Please see comment)     F.  Prostate, left apex, biopsy   - Prostatic adenocarcinoma, acinar type, Tolar's grade 4 + 5 = 9, grade   group is 5, number of cores involved   is 1 of 1, total surface area involved is 90 %,perineural invasion is   identified. Percentage of grade 4 = 95%,   percentage of grade 5 = 5% (Please see comment)     G.  Prostate, right lateral  base, biopsy   - Benign prostatic tissue. Negative for malignancy     H.  Prostate, right lateral mid, biopsy   - Benign prostatic tissue. Negative for malignancy     I.  Prostate, right lateral apex, biopsy   -Focal high grade prostatic intraepithelial neoplasia. Negative for   invasive malignancy.     J.  Prostate, right base, biopsy   - Benign prostatic tissue. Negative for malignancy     K.  Prostate, right mid, biopsy   - Benign prostatic tissue. Negative for malignan.cy     L.  Prostate, right apex, biopsy   - Focal high-grade prostatic intraepithelial neoplasia. Negative for   invasive malignancy.     M. Prostate, left Steger lesion x3, biopsy-   - Prostatic adenocarcinoma, acinar type, Drayden's grade 4 + 5 = 9, grade   group is 5, number of cores involved   is 3 of 3, total surface area involved is 95 %,perineural invasion is   identified. Percentage of grade 4 = 95%,   percentage of grade 5 = 5% (Please see comment)     RALP PATHOLOGY  FINAL DIAGNOSIS:   A.  Specimen type, Procedure:  Prostate gland and attached bilateral   seminal vesicles, robotic assisted   radical prostatectomy with seminal vesicles     Histologic type:  Acinar     Histologic Grade Group and Drayden Score:  WHO Grade Group 5;  Drayden   score 9 (5+4),  Tertiary pattern grade   3.     Percentage of Pattern 4 in Drayden score - Approximately 40%     Tumor Quantitation:   Adenocarcinoma present in 16 of 24 blocks of tissue   examined     Extraprostatic Extension:  Not identified     Urinary Bladder Neck Invasion:  Not identified     Seminal Vesicle Invasion:  Not identified     Lymphovascular Invasin:  Not identified     Perineural Invasion:  Multiple foci present     Margins:  Uninvolved     Treatment Effect:   - No known presurgical therapy     Regional Lymph Nodes:  Seven bilateral pelvic lymph nodes negative for   metastatic tumor, specimen [id number]     Pathologic Stage Classification:  pT2  pN0  pM - Not applicable     B.  Bilateral  pelvic lymph nodes, dissection:   - Seven benign lymph nodes, each negative for metastatic tumor.     IMAGING:  All pertinent imaging reviewed:    All imaging studies reviewed by me.  I personally reviewed these imaging films.  A formal report from radiology will follow.    FINDINGS:  Size: 3.5 x 4.8 x 3.8 cm  Volume: 33.2 mL  Hemorrhage: Absent  Peripheral zone: Heterogeneous on T2-weighted images. Suspicious  lesions as detailed below.  Transition zone: Nonenlarged. Transition zone nodules which are  circumscribed or mostly encapsulated without diffusion restriction.   PI-RADS 2.  No highly suspicious nodules.     Lesion(s) in rank order of severity (highest score- to lowest score,  then by size)      Lesion 1:  Location: Left apex peripheral zone at the 5-6 o'clock position  relative to the urethra. Series 5 image 62.   Additional prostate regions involved: There is abutment of the  external urethral sphincter.  Size: 28 mm  T2 description: Moderate hypointense  T2 numerical assessment: 5  DWI description: Hypointense on ADC and hyperintense on high b-value  DWI  DWI numerical assessment: 5  DCE assessment: Positive    Prostate margin: Capsular abutment 6-15 mm with focal bulging. The  mass is also extending anteriorly and abutting the external urinary  sphincter (series 17 image 18) as well as extending superiorly is a  linear band invading the inferomedial bilateral seminal vesicles  (series 17 image 10).  Lesion overall PI-RADS category: 5     Neurovascular bundles: No neurovascular bundle involvement by  malignancy.  Seminal vesicles: Both seminal vesicles are involved by malignancy.  Lymph nodes: No lymph node involvement  Bones: No suspicious lesions  Other pelvic organs: No additional findings.                                                                   IMPRESSION:  1. Based on the most suspicious abnormality, this exam is  characterized as PIRADS 5 - Clinically significant cancer is highly  likely  to be present.  The most suspicious abnormality is located at  the left apex peripheral zone at the 5-6 o'clock position relative to  the urethra and there is capsular bulging indicating moderately high  suspicion of minimal extraprostatic extension. The mass is also  extending anteriorly and abutting the external urinary sphincter as  well as extending superiorly and invading the inferomedial seminal  vesicles.  2. No suspicious adenopathy or evidence of pelvic metastases.    ASSESSMENT and PLAN  61 year old man with PSA 30.50 and Wolfgang 5+4=9 prostate cancer with MRI showing PIRADs 5 lesion with high suspicion of extra-prostatic extension and possible SV invasion. Now s/p RALP/BPLND on 12/14/20 with pT2N0 disease, margins negative -now with rising PSA. having Erectile dysfunction     Prostate cancer  -He is doing very well from a urinary control standpoint  Reviewed his PSA history and trend which has been rising since his prostatectomy  -We discussed my recommendation for referral to radiation oncology and consideration of genetic testing with a decipher score to determine the benefit from ADT.  Given his high Wolfgang score I would recommend ADT however he is very hesitant  -He is very hesitant about radiation therapy and wants to discuss with his wife  -Order for referral placed previously and he has Dr. Gan's number  -He again refuses any consideration for ADT  -He will give Dr. Gan's office a call  -PSA today    Erectile dysfunction  -We discussed the proper dosing instructions for Trimix as well as my dosing rules  -We went through demonstration for how to drop the medication  -We will communicate via [employer] regarding his dosing    Time spent: 20 minutes spent on the date of the encounter doing chart review, history and exam, documentation and further activities as noted above.     Dov Tellez MD   Urology  [facility] Phone:  [phone]  [facility] Phone: [phone]

## 2021-12-22 NOTE — LETTER
"12/22/2021       RE: Sowmya Villavicencio  [address] [address]     Dear Colleague,    Thank you for referring your patient, Sowmya Villavicencio, to the [facility] at [facility]. Please see a copy of my visit note below.    [place]  CHIEF COMPLAINT   It was my pleasure to see Ingrid Villavicencio who is a 61 year old male for follow-up of prostate cancer .      HPI   Ingrid Villavicencio is a very pleasant 61 year old male    Initially seen 9/25/20:  \"Ingrid Villavicencio is a 60 year old male who is being seen for evaluation of Elevated PSA.  He had recent PSA in August 2020 2.8.  No clear records that I could find of a prior PSA.  He denies any voiding symptoms.  He denies any family history of prostate cancer.  He denies any hip or bony tenderness.\"    10/14/20:  MR- fusion biopsy    Initial PSA: 30.5  Biopsy Phil[place] Score: 4 + 5 = 9  Risk Group: High  Status post RALP/BPLND on 12/14/20  Age at time of surgery: 60  Pathologic Philmont Score: 5 + 4 = 9  Positive Margins: None  Extra Capsular Extension: None  SV Involvement: None  Pathologic Stage: pT2  Number of Lymph Nodes Removed: 7  Number of Positive Lymph Nodes: 0  Nerve status: Right nerve sparing, Left partial nerve sparing    3/26/21:  Doing well with urine control  No leaking for the past 3-4 weeks  Using a pad for security at night  Having only partial erections    6/25/21:   Doing great with urine control   No pads needed  No change in erections, partial erection without ability to penetrate  He notes severe headache with tadalafil (Cialis) 5mg daily   He has been reading and is interested in intracavernosal injections    9/29/21:  Doing great with urine control   He has not yet received the intracavernosal injection prescription, some issue with the pharmacy  He notes that his wife has been out of town for the last 3 months and just returned    TODAY 12/22/21:      PHYSICAL " "EXAM  Patient is a 61 year old  male   Vitals: Blood pressure 130/70, height 1.702 m (5' 7\"), weight 65.8 kg (145 lb).  Body mass index is 22.71 kg/m .  General Appearance Adult:   Alert, no acute distress, oriented  HENT: throat/mouth:normal, good dentition  Lungs: no respiratory distress, or pursed lip breathing  Heart: No obvious jugular venous distension present  Abdomen: non - distended  Incisions: well healed, no hernia  Musculoskeltal: extremities normal, no peripheral edema  Skin: no suspicious lesions or rashes  Neuro: Alert, oriented, speech and mentation normal  Psych: affect and mood normal  Gait: Normal     Component PSA PSA Diag Urologic Phys   Latest Ref Rng & Units 0.00 - 4.00 ug/L 0.00 - 4.00 ng/mL   8/17/2020 22.80 (H)    9/25/2020  30.50 (H)   3/26/2021  0.08   6/25/2021  0.11   9/29/2021 0.24      PATHOLOGY:  FINAL DIAGNOSIS:   A.  Prostate, left lateral base , biopsy   - Benign prostatic tissue. Negative for malignancy     B.  Prostate, left lateral mid, biopsy   - Prostatic adenocarcinoma, acinar type, Wolfgang's grade 3+ 3 = 6, grade   group is1, number of cores involved   is 1 of 1, total surface area involved is <5 %,perineural invasion is not   identified     C.  Prostate, left lateral apex, biopsy   - Prostatic adenocarcinoma, acinar type, Wolfgang's grade 4 +4 = 8, grade   group is 4, number of cores involved   is 1 of 1, total surface area involved is 40 %,perineural invasion is not   identified     D.  Prostate, left base, biopsy   - Benign prostatic tissue. Negative for malignancy     E.  Prostate, left mid, biopsy   - Prostatic adenocarcinoma, acinar type, Ermine's grade 4 + 5 = 9, grade   group is 5, number of cores involved   is 1 of 1, total surface area involved is 75 %,perineural invasion is   identified. Percentage of grade 4 = 90%,   percentage of grade 5 = 10% (Please see comment)     F.  Prostate, left apex, biopsy   - Prostatic adenocarcinoma, acinar type, Ermine's grade 4 + " 5 = 9, grade   group is 5, number of cores involved   is 1 of 1, total surface area involved is 90 %,perineural invasion is   identified. Percentage of grade 4 = 95%,   percentage of grade 5 = 5% (Please see comment)     G.  Prostate, right lateral base, biopsy   - Benign prostatic tissue. Negative for malignancy     H.  Prostate, right lateral mid, biopsy   - Benign prostatic tissue. Negative for malignancy     I.  Prostate, right lateral apex, biopsy   -Focal high grade prostatic intraepithelial neoplasia. Negative for   invasive malignancy.     J.  Prostate, right base, biopsy   - Benign prostatic tissue. Negative for malignancy     K.  Prostate, right mid, biopsy   - Benign prostatic tissue. Negative for malignan.cy     L.  Prostate, right apex, biopsy   - Focal high-grade prostatic intraepithelial neoplasia. Negative for   invasive malignancy.     M. Prostate, left Mitchell lesion x3, biopsy-   - Prostatic adenocarcinoma, acinar type, [place]'s grade 4 + 5 = 9, grade   group is 5, number of cores involved   is 3 of 3, total surface area involved is 95 %,perineural invasion is   identified. Percentage of grade 4 = 95%,   percentage of grade 5 = 5% (Please see comment)     RALP PATHOLOGY  FINAL DIAGNOSIS:   A.  Specimen type, Procedure:  Prostate gland and attached bilateral   seminal vesicles, robotic assisted   radical prostatectomy with seminal vesicles     Histologic type:  Acinar     Histologic Grade Group and [place] Score:  WHO Grade Group 5;  Wolfgang   score 9 (5+4),  Tertiary pattern grade   3.     Percentage of Pattern 4 in Wolfgang score - Approximately 40%     Tumor Quantitation:   Adenocarcinoma present in 16 of 24 blocks of tissue   examined     Extraprostatic Extension:  Not identified     Urinary Bladder Neck Invasion:  Not identified     Seminal Vesicle Invasion:  Not identified     Lymphovascular Invasin:  Not identified     Perineural Invasion:  Multiple foci present     Margins:  Uninvolved      Treatment Effect:   - No known presurgical therapy     Regional Lymph Nodes:  Seven bilateral pelvic lymph nodes negative for   metastatic tumor, specimen [id number]     Pathologic Stage Classification:  pT2  pN0  pM - Not applicable     B.  Bilateral pelvic lymph nodes, dissection:   - Seven benign lymph nodes, each negative for metastatic tumor.     IMAGING:  All pertinent imaging reviewed:    All imaging studies reviewed by me.  I personally reviewed these imaging films.  A formal report from radiology will follow.    FINDINGS:  Size: 3.5 x 4.8 x 3.8 cm  Volume: 33.2 mL  Hemorrhage: Absent  Peripheral zone: Heterogeneous on T2-weighted images. Suspicious  lesions as detailed below.  Transition zone: Nonenlarged. Transition zone nodules which are  circumscribed or mostly encapsulated without diffusion restriction.   PI-RADS 2.  No highly suspicious nodules.     Lesion(s) in rank order of severity (highest score- to lowest score,  then by size)      Lesion 1:  Location: Left apex peripheral zone at the 5-6 o'clock position  relative to the urethra. Series 5 image 62.   Additional prostate regions involved: There is abutment of the  external urethral sphincter.  Size: 28 mm  T2 description: Moderate hypointense  T2 numerical assessment: 5  DWI description: Hypointense on ADC and hyperintense on high b-value  DWI  DWI numerical assessment: 5  DCE assessment: Positive    Prostate margin: Capsular abutment 6-15 mm with focal bulging. The  mass is also extending anteriorly and abutting the external urinary  sphincter (series 17 image 18) as well as extending superiorly is a  linear band invading the inferomedial bilateral seminal vesicles  (series 17 image 10).  Lesion overall PI-RADS category: 5     Neurovascular bundles: No neurovascular bundle involvement by  malignancy.  Seminal vesicles: Both seminal vesicles are involved by malignancy.  Lymph nodes: No lymph node involvement  Bones: No suspicious  lesions  Other pelvic organs: No additional findings.                                                                   IMPRESSION:  1. Based on the most suspicious abnormality, this exam is  characterized as PIRADS 5 - Clinically significant cancer is highly  likely to be present.  The most suspicious abnormality is located at  the left apex peripheral zone at the 5-6 o'clock position relative to  the urethra and there is capsular bulging indicating moderately high  suspicion of minimal extraprostatic extension. The mass is also  extending anteriorly and abutting the external urinary sphincter as  well as extending superiorly and invading the inferomedial seminal  vesicles.  2. No suspicious adenopathy or evidence of pelvic metastases.    ASSESSMENT and PLAN  61 year old man with PSA 30.50 and [place] 5+4=9 prostate cancer with MRI showing PIRADs 5 lesion with high suspicion of extra-prostatic extension and possible SV invasion. Now s/p RALP/BPLND on 12/14/20 with pT2N0 disease, margins negative -now with rising PSA. having Erectile dysfunction     Prostate cancer  -He is doing very well from a urinary control standpoint  Reviewed his PSA history and trend which has been rising since his prostatectomy  -We discussed my recommendation for referral to radiation oncology and consideration of genetic testing with a decipher score to determine the benefit from ADT.  Given his high Wolfgang score I would recommend ADT however he is very hesitant  -He is very hesitant about radiation therapy and wants to discuss with his wife  -Order for referral placed previously and he has Dr. Gan's number  -He again refuses any consideration for ADT  -He will give Dr. Gan's office a call  -PSA today    Erectile dysfunction  -We discussed the proper dosing instructions for Trimix as well as my dosing rules  -We went through demonstration for how to drop the medication  -We will communicate via [employer] regarding his dosing    Time  spent: 20 minutes spent on the date of the encounter doing chart review, history and exam, documentation and further activities as noted above.     Dov Tellez MD   Urology  [facility]  [facility] Phone: [phone]  [facility] Phone: [phone]

## 2021-12-22 NOTE — PATIENT INSTRUCTIONS
DOSING INSTRUCTIONS:  - Start with 10 units  - Increase by 5 units as needed  - Goal is erection firm enough for penetration, lasting ~30-45 minutes  - Only inject once in 24 hours  - Only inject 4 times per week    Patient Education     Penile Self-Injection Procedure  Self-injection is a good option if you have erectile dysfunction (ED). You put a tiny needle into the side of your penis and inject a medicine. This helps your penis get hard and stay that way long enough for sex. And sex and orgasm will feel as good as always. You may be nervous about doing self-injection at first. But with practice, it will get easier. Your healthcare provider will show you how to do self-injection the first time. If you have anxiety about giving the shot, if you have manual dexterity problems, or if you can't see your penis due to your weight, your partner can be taught how to inject the medicine.   Tell your healthcare provider about any medicines you take and any recent illnesses or health problems.   Preparing for injection    Wash your hands well with soap and water.    Prepare the medicine (if needed).    Sit or  a comfortable position in a warm, well-lit room. If you need to, sit or  front of a mirror.    Find an injection site on one side of your penis, in a place with no visible veins. (Don t inject into the top, bottom, or head of the penis.)    Clean the injection site with an alcohol swab. Grasp the head of your penis firmly with your thumb and forefinger (don t just pinch the skin). Stretch the penis so the skin on the shaft is taut.    Injecting the medicine    Rest your penis against your inner thigh and pull it gently toward your knee. Don t twist or rotate it. This way you ll be sure to inject the medicine into the spot you chose and cleaned before.    Hold the syringe between your thumb and fingers, like you re holding a pen. Rest your forearm on your thigh for support.    Insert the needle at a  90  angle (perpendicular) to the shaft. Do this quickly to reduce discomfort. (The needle should go in easily. If it doesn t, stop right away.)    Move your thumb to the plunger. Press down to inject the medicine, counting to 5.    Remove the needle and dispose of it safely.    Gaining an erection    Apply pressure to the injection site for a few minutes. This prevents swelling and bruising. It also helps spread the medicine.     Stand up. This may help your erection develop. Foreplay often helps, too.    Your penis should become firm within  10 to 20 minutes. The erection will last long enough for sex, and maybe longer.    When to seek medical care  Go to the closest emergency department if you have an erection that lasts longer than  3 to 4 hours. Erections that last longer than 4 hours can damage the penis and lead to permanent ED.   Call your healthcare provider if you have:    Bleeding or bruising    Severe pain    Scarring or curvature of the penis  Steve last reviewed this educational content on 6/1/2019 2000-2021 [employer]. All rights reserved. This information is not intended as a substitute for professional medical care. Always follow your healthcare professional's instructions.

## 2021-12-30 DIAGNOSIS — C61 PROSTATE CANCER (H): Primary | ICD-10-CM

## 2021-12-31 ENCOUNTER — TELEPHONE (OUTPATIENT)
Dept: UROLOGY | Facility: CLINIC | Age: 61
End: 2021-12-31
Payer: COMMERCIAL

## 2021-12-31 NOTE — TELEPHONE ENCOUNTER
----- Message from Renee Moon LPN sent at 12/30/2021  1:55 PM CST -----  Please call patient to arrange PSA and Follow-up in 3 months. Order for PSA placed.   Thanks!! :)  ~Renee   ----- Message -----  From: Dov Tellez MD  Sent: 12/27/2021   6:57 AM CST  To:  [facility],    Here is your PSA result from last week.  It is somewhat reassuring that it only went up very slightly in the last 2 months from 0.24 to 0.26.  I still recommend that you see Dr. Gan to discuss salvage radiation therapy.  I would like you to have a PSA recheck in about 3 months.    Thanks,   Dov Tellez M.D.

## 2022-01-11 ENCOUNTER — TELEPHONE (OUTPATIENT)
Dept: FAMILY MEDICINE | Facility: CLINIC | Age: 62
End: 2022-01-11
Payer: COMMERCIAL

## 2022-01-11 DIAGNOSIS — L29.0 RECTAL ITCHING: ICD-10-CM

## 2022-01-11 DIAGNOSIS — R14.0 ABDOMINAL BLOATING: Primary | ICD-10-CM

## 2022-01-11 NOTE — TELEPHONE ENCOUNTER
Patient called requesting referral to GI.  Pended referral to patient preferred GI DR Guru Galindo    Patient states GI issues were discussed with PCP at October visit.  Pended referral for review.     Vanessa Mckee RN

## 2022-01-11 NOTE — TELEPHONE ENCOUNTER
When last seen, I did refer patient to colorectal surgery which I recommend he sees.  Not sure why he is requesting GI referral.,  Please clarify with patient has a scheduled with colorectal surgery?  Thank you

## 2022-01-12 DIAGNOSIS — R14.0 ABDOMINAL BLOATING: Primary | ICD-10-CM

## 2022-01-12 NOTE — TELEPHONE ENCOUNTER
Pt would prefer to see GI due to ongoing bloating, gurgling abdominal sound and rectal itching. Denies any acute symptoms. Unknown food triggers bloading. Denies abdominal pain, diarrhea or constipation. States he was unable to discuss bloating at last visit since he was rushed. Pt wants help of a specialist that would be able to address these concerns for why he request GI referral.

## 2022-01-13 NOTE — TELEPHONE ENCOUNTER
Left message on patient secure voice mail message that referral was placed by PCP.     Stacey Rodriguez RN  [facility]

## 2022-01-20 NOTE — TELEPHONE ENCOUNTER
Please place GI referral I will sign.  Please add the name of the provider patient is requesting.  Thank you

## 2022-01-20 NOTE — TELEPHONE ENCOUNTER
Dr. Espitia, referral ready for [facility]. Please sign, thank you.     Stacey RodriguezRN  -[facility]

## 2022-01-20 NOTE — TELEPHONE ENCOUNTER
TO PCP:     Pt called and states he does not want to be referred to Dr Mila ALONSO, he wants to go to [facility] GI.     Requesting referral to Dr Guru Mateo GTZ, Triage RN  [facility]

## 2022-01-20 NOTE — TELEPHONE ENCOUNTER
Pt notified and given number to schedule    Kerry GTZ, Triage RN  [facility]

## 2022-01-25 ENCOUNTER — TELEPHONE (OUTPATIENT)
Dept: GASTROENTEROLOGY | Facility: CLINIC | Age: 62
End: 2022-01-25
Payer: COMMERCIAL

## 2022-01-26 NOTE — CONFIDENTIAL NOTE
Advanced Endoscopy     Referring provider: Sara Espitia MD    Referred to: [facility]     Provider Requested: pt requesting Guru Galindo     Referral Received: 1/20/22     Records received: EPIC     Images received:     Evaluation for:   Abdominal bloating   Rectal itching        Clinical History (per RN review):   Anal fissure    -  Primary      Relevant Medications     hydrocortisone, Perianal, (HYDROCORTISONE) 2.5 % cream     Other Relevant Orders     Colorectal Surgery Referral         When last seen, I did refer patient to colorectal surgery which I recommend he sees.  Not sure why he is requesting GI referral.,  Please clarify with patient has a scheduled with colorectal surgery?  Pt would prefer to see GI due to ongoing bloating, gurgling abdominal sound and rectal itching. Denies any acute symptoms. Unknown food triggers bloading. Denies abdominal pain, diarrhea or constipation. States he was unable to discuss bloating at last visit since he was rushed. Pt wants help of a specialist that would be able to address these concerns for why he request GI referral.   MD review date:   MD Decision for clinic consultation/Orders:            Referral updates/Patient contacted:

## 2022-01-27 NOTE — TELEPHONE ENCOUNTER
Called Pt RE referral:    Referring provider: Sara Espitia MD      Evaluation for:   Abdominal bloating   Rectal itching     Called Pt to schedule appt with Dr. Gudino. No answer. Left VM for Pt to call back.    Madan Avendaño LPN

## 2022-01-31 NOTE — TELEPHONE ENCOUNTER
Attempted to contact Pt again RE referral. No answer. Left VM for Pt to call back.    Madan Avendaño LPN

## 2022-02-01 NOTE — TELEPHONE ENCOUNTER
Pt called back and left VM stating he does not need the appt with us, as his dx is anal itching. He said he does not know why his doctor referred him to us, but he thinks he should see a colorectal specialist instead. Pt wanted to call and inform us that he does not need the appt.    Madan Avendaño LPN

## 2022-02-02 ENCOUNTER — TELEPHONE (OUTPATIENT)
Dept: FAMILY MEDICINE | Facility: CLINIC | Age: 62
End: 2022-02-02
Payer: COMMERCIAL

## 2022-02-02 ENCOUNTER — NURSE TRIAGE (OUTPATIENT)
Dept: NURSING | Facility: CLINIC | Age: 62
End: 2022-02-02
Payer: COMMERCIAL

## 2022-02-02 DIAGNOSIS — K60.2 ANAL FISSURE: Primary | ICD-10-CM

## 2022-02-02 NOTE — TELEPHONE ENCOUNTER
"Pt calls back.  States \"I need to talk with RN-Aileen.\"  \"She was helping me with the mistake on my colo-rectal referral.\"  Now attempting to reach [place] on-site team.  However, now told Aileen is on-site at [place], not at [place].  Therefore messaging Aileen thru Teams ...  Aileen responds thru Teams.  Conveyed the following message:  Sowmya asks that you call him back -> please leave a detailed message on his cell # if not able to answer -> and please indicate a specific phone # where you can be reached if he should call you back (since we've gone round and round trying to get to the correct on-site location where you are .... thanks so much !    Karley [facility] Nurse Advisor     Additional Information    [1] Follow-up call to recent contact AND [2] information only call, no triage required    Protocols used: INFORMATION ONLY CALL-A-AH      "

## 2022-02-02 NOTE — TELEPHONE ENCOUNTER
Patient informed that referral was reordered and given number [phone] ([phone]) to schedule. Also gave web site to go to if wishes to submit online appointment request.    Patient was not sure that colon rectal referral was what he needed for persistent rectal itching. Reassured the patient that he discussed his symptoms with Dr. Espitia.  Aileen Landers RN

## 2022-02-02 NOTE — TELEPHONE ENCOUNTER
Referral from 10/22/21 is for [facility], but lists [place] clinic number. Please reorder as [facility].

## 2022-02-03 NOTE — TELEPHONE ENCOUNTER
Diagnosis, Referred by & from: Anal Itching, referred by Dr. Sara Espitia   Appt date: 2/7/2022   NOTES STATUS DETAILS   OFFICE NOTE from referring provider Internal [facility]:  10/22/21 - PCC OV iwht Dr. Espitia   OFFICE NOTE from other specialist Care Everywhere / Internal ealth:  12/22/21, 9/29/21 - URO OV with Dr. Tellez    [place]:  11/3/20 - URO OV with Sara Zapata NP   DISCHARGE SUMMARY from hospital N/A    DISCHARGE REPORT from the ER N/A    OPERATIVE REPORT Internal ealth:  12/14/20 - OP Note for LAPAROSCOPIC RADICAL PROSTATECTOMY WITH BILTERAL PELVIC LYMPH NODE DISSECTION with Dr. Tellez   MEDICATION LIST Internal    LABS     ANAL PAP N/A    BIOPSIES/PATHOLOGY RELATED TO DIAGNOSIS Internal [facility]:  12/14/20 - Prostate Biopsy (Case: [id number])  10/14/20 - Prostate Biopsy (Case: [id number])   DIAGNOSTIC PROCEDURES     PFC TESTING (from the Pelvic floor center includes Manometry, PDNL, EMG, etc.) N/A    COLONOSCOPY N/A    UPPER ENDOSCOPY (EGD) N/A    FLEX SIGMOIDOSCOPY N/A    ERCP N/A    IMAGING (DISC & REPORT)      CT N/A    MRI [facility]:  10/1/20 - MRI Prostate   XRAY N/A    ULTRASOUND  (ENDOANAL/ENDORECTAL) N/A

## 2022-02-07 ENCOUNTER — PRE VISIT (OUTPATIENT)
Dept: SURGERY | Facility: CLINIC | Age: 62
End: 2022-02-07

## 2022-02-07 ENCOUNTER — OFFICE VISIT (OUTPATIENT)
Dept: SURGERY | Facility: CLINIC | Age: 62
End: 2022-02-07
Payer: COMMERCIAL

## 2022-02-07 VITALS
OXYGEN SATURATION: 100 % | SYSTOLIC BLOOD PRESSURE: 139 MMHG | DIASTOLIC BLOOD PRESSURE: 78 MMHG | WEIGHT: 154.4 LBS | BODY MASS INDEX: 24.23 KG/M2 | HEIGHT: 67 IN | HEART RATE: 66 BPM

## 2022-02-07 DIAGNOSIS — L29.0 ANAL PRURITUS: Primary | ICD-10-CM

## 2022-02-07 DIAGNOSIS — K60.2 ANAL FISSURE: ICD-10-CM

## 2022-02-07 PROCEDURE — 99204 OFFICE O/P NEW MOD 45 MIN: CPT | Performed by: COLON & RECTAL SURGERY

## 2022-02-07 RX ORDER — DIPHENHYDRAMINE HCL 25 MG
25 TABLET ORAL EVERY 6 HOURS PRN
Qty: 20 TABLET | Refills: 1 | Status: SHIPPED | OUTPATIENT
Start: 2022-02-07

## 2022-02-07 RX ORDER — METHYLCELLULOSE 2 G/19G
POWDER, FOR SOLUTION ORAL
Qty: 850 G | Refills: 3 | Status: SHIPPED | OUTPATIENT
Start: 2022-02-07

## 2022-02-07 RX ORDER — ANORECTAL OINTMENT 15.7; .44; 24; 20.6 G/100G; G/100G; G/100G; G/100G
OINTMENT TOPICAL 4 TIMES DAILY PRN
Qty: 113 G | Refills: 3 | Status: SHIPPED | OUTPATIENT
Start: 2022-02-07

## 2022-02-07 ASSESSMENT — PAIN SCALES - GENERAL: PAINLEVEL: NO PAIN (0)

## 2022-02-07 ASSESSMENT — MIFFLIN-ST. JEOR: SCORE: 1453.98

## 2022-02-07 NOTE — LETTER
2022       RE: Sowmya Villavicencio  [address] [facility] [id number]     Dear Colleague,    Thank you for referring your patient, Sowmya Villavicencio, to the [facility] COLON AND RECTAL SURGERY CLINIC [place] at [facility]. Please see a copy of my visit note below.    Colon and Rectal Surgery Clinic Note    RE: Sowmya Villavicencio.  : 1960.  LOYDA: 2022.    Reason for visit: Pruritus ani.    HPI: 61-year-old male who presents with a 4-month history of progressive although intermittent anal pruritus.  This tends to be more frequent with physical activity.  Denies anorectal pain, abdominal pain, constipation or diarrhea, blood per rectum, anal protrusion, fecal seepage or incontinence, or new urinary symptoms.  Has felt minor burning, mainly when he was using something topical on the anus.  Currently having 1-2 soft bowel movements per day.  His primary care provider recommended hydrocortisone ointment and this did not help significantly.  He does not feel like he has to wipe more often than normal.  Had a negative Cologuard test in .    Medical history:  Past Medical History:   Diagnosis Date     Gastroesophageal reflux disease      Prostate cancer (H)      Shingles     in the eye       Surgical history:  Past Surgical History:   Procedure Laterality Date     DAVINCI PROSTATECTOMY Bilateral 2020    Procedure: ROBOTIC ASSISTED LAPAROSCOPIC RADICAL PROSTATECTOMY WITH BILATERAL PELVIC LYMPH NODE DISSECTION;  Surgeon: Dov Tellez MD;  Location:  OR     PROSTATE SURGERY         Family history:  Family History   Problem Relation Age of Onset     No Known Problems Mother        Medications:  Current Outpatient Medications   Medication Sig Dispense Refill     cholecalciferol 25 MCG (1000 UT) TABS Take 1 tablet by mouth daily        carboxymethylcellulose (CARBOXYMETHYLCELLULOSE SODIUM) 0.5 % SOLN ophthalmic solution Place 1 drop into both  "eyes 4 times daily (Patient not taking: Reported on 12/22/2021) 30 mL 11     COMPOUNDED NON-CONTROLLED SUBSTANCE (CMPD RX) - PHARMACY TO MIX COMPOUNDED MEDICATION TriMix #9  One vial. Syringes also  Directions  Inject 0.1cc as directed by MD garcia (Patient not taking: Reported on 2/7/2022) 1 kit 11     hydrocortisone, Perianal, (HYDROCORTISONE) 2.5 % cream Place rectally 2 times daily as needed for hemorrhoids (Patient not taking: Reported on 12/22/2021) 30 g 0     hydrocortisone, Perianal, (HYDROCORTISONE) 2.5 % cream Place rectally 2 times daily as needed for hemorrhoids (Patient not taking: Reported on 12/22/2021) 30 g 0     multivitamin w/minerals (MULTI-VITAMIN) tablet Take 1 tablet by mouth daily ([employer] Immune formula)  (Patient not taking: Reported on 12/22/2021)       tadalafil (CIALIS) 5 MG tablet Take 1 tablet (5 mg) by mouth every 24 hours (Patient not taking: Reported on 12/22/2021) 90 tablet 1       Allergies:  Allergies   Allergen Reactions     Amoxicillin Diarrhea and GI Disturbance       Social history:   Social History     Tobacco Use     Smoking status: Never Smoker     Smokeless tobacco: Never Used   Substance Use Topics     Alcohol use: No     Marital status: .    Physical Examination:  /78 (BP Location: Left arm, Patient Position: Sitting, Cuff Size: Adult Regular)   Pulse 66   Ht 5' 7\"   Wt 154 lb 6.4 oz   SpO2 100%   BMI 24.18 kg/m    General: Well hydrated. No acute distress.  Perianal external examination:  Perianal skin: intact.  Lesions: No.  Eversion of buttocks: There was evidence of an anal fissure. Details: Epithelialized posterior midline.  Skin tags or external hemorrhoids: Yes: Tiny anterior midline skin tag.  Digital rectal examination: Was performed.   Sphincter tone: Good.  Palpable lesions: No.  Other: None.  Bimanual examination: was not performed.    Anoscopy: Was performed.   Hemorrhoids: Yes.  Grade 1-2 internal hemorrhoids with no evidence of " thrombosis or bleeding.  Lesions: No    Investigations:  None.    Procedures:  None.    ASSESSMENT  61-year-old male with pruritus ani.  Posterior midline epithelialized anal fissure seen on exam.  Most likely cause of his pruritus.      PLAN  1.  High-fiber diet.  Start Citrucel 1 tablespoon daily and increase up to twice daily as needed to have regular daily bowel movements.  2.  Diltiazem ointment.  Use this for at least 6 weeks.  3.  Calmoseptine 3-4 times per day for perianal skin irritation and itchiness.  4.  Sitz bath's, 3-4 times per day.  5.  Benadryl as needed for itchiness.    30 minutes spent on the date of the encounter doing chart review, history and exam, documentation and further activities as noted above.      Srinivas Leggett M.D., M.Sc.     Division of Colon and Rectal Surgery  [facility]    Referring Provider:  Referred Self, MD  No address on file     Primary Care Provider:  Sara Espitia        Again, thank you for allowing me to participate in the care of your patient.      Sincerely,    Srinivas Leggett MD

## 2022-02-07 NOTE — PATIENT INSTRUCTIONS
Follow up:  1. Calmoseptine cream 3 times a day  2. Daily fiber supplement and increase water intake.  3. Benadryl 25mg q6 hours for itching  4. Diltiazem cream three times daily    Please call with any questions or concerns regarding your clinic visit today.    It is a pleasure being involved in your health care.    Contacts post-consultation depending on your need:    Radiology Appointments [phone]    Schedule Clinic Appointments [phone] # 1   M-F 7:30 - 5 pm    ROBERTO Faye [phone]    Clinic Fax Number [phone]    Surgery Scheduling [phone]    My Chart is available 24 hours a day and is a secure way to access your records and communicate with your care team.  I strongly recommend signing up if you haven't already done so, if you are comfortable with computers.  If you would like to inquire about this or are having problems with My Chart access, you may call [phone] or go online at [email or web address].  Please allow at least 24 hours for a response and extra time on weekends and Holidays.

## 2022-02-07 NOTE — PROGRESS NOTES
Colon and Rectal Surgery Clinic Note    RE: Sowmya Villavicencio.  : 1960.  LOYDA: 2022.    Reason for visit: Pruritus ani.    HPI: 61-year-old male who presents with a 4-month history of progressive although intermittent anal pruritus.  This tends to be more frequent with physical activity.  Denies anorectal pain, abdominal pain, constipation or diarrhea, blood per rectum, anal protrusion, fecal seepage or incontinence, or new urinary symptoms.  Has felt minor burning, mainly when he was using something topical on the anus.  Currently having 1-2 soft bowel movements per day.  His primary care provider recommended hydrocortisone ointment and this did not help significantly.  He does not feel like he has to wipe more often than normal.  Had a negative Cologuard test in .    Medical history:  Past Medical History:   Diagnosis Date     Gastroesophageal reflux disease      Prostate cancer (H)      Shingles     in the eye       Surgical history:  Past Surgical History:   Procedure Laterality Date     DAVINCI PROSTATECTOMY Bilateral 2020    Procedure: ROBOTIC ASSISTED LAPAROSCOPIC RADICAL PROSTATECTOMY WITH BILATERAL PELVIC LYMPH NODE DISSECTION;  Surgeon: Dov Tellez MD;  Location: [facility] OR     PROSTATE SURGERY         Family history:  Family History   Problem Relation Age of Onset     No Known Problems Mother        Medications:  Current Outpatient Medications   Medication Sig Dispense Refill     cholecalciferol 25 MCG (1000 UT) TABS Take 1 tablet by mouth daily        carboxymethylcellulose (CARBOXYMETHYLCELLULOSE SODIUM) 0.5 % SOLN ophthalmic solution Place 1 drop into both eyes 4 times daily (Patient not taking: Reported on 2021) 30 mL 11     COMPOUNDED NON-CONTROLLED SUBSTANCE (CMPD RX) - PHARMACY TO MIX COMPOUNDED MEDICATION TriMix #9  One vial. Syringes also  Directions  Inject 0.1cc as directed by MD garcia (Patient not taking: Reported on 2022) 1 kit 11     hydrocortisone, Perianal,  "(HYDROCORTISONE) 2.5 % cream Place rectally 2 times daily as needed for hemorrhoids (Patient not taking: Reported on 12/22/2021) 30 g 0     hydrocortisone, Perianal, (HYDROCORTISONE) 2.5 % cream Place rectally 2 times daily as needed for hemorrhoids (Patient not taking: Reported on 12/22/2021) 30 g 0     multivitamin w/minerals (MULTI-VITAMIN) tablet Take 1 tablet by mouth daily ([employer] Immune formula)  (Patient not taking: Reported on 12/22/2021)       tadalafil (CIALIS) 5 MG tablet Take 1 tablet (5 mg) by mouth every 24 hours (Patient not taking: Reported on 12/22/2021) 90 tablet 1       Allergies:  Allergies   Allergen Reactions     Amoxicillin Diarrhea and GI Disturbance       Social history:   Social History     Tobacco Use     Smoking status: Never Smoker     Smokeless tobacco: Never Used   Substance Use Topics     Alcohol use: No     Marital status: .    Physical Examination:  /78 (BP Location: Left arm, Patient Position: Sitting, Cuff Size: Adult Regular)   Pulse 66   Ht 5' 7\"   Wt 154 lb 6.4 oz   SpO2 100%   BMI 24.18 kg/m    General: Well hydrated. No acute distress.  Perianal external examination:  Perianal skin: intact.  Lesions: No.  Eversion of buttocks: There was evidence of an anal fissure. Details: Epithelialized posterior midline.  Skin tags or external hemorrhoids: Yes: Tiny anterior midline skin tag.  Digital rectal examination: Was performed.   Sphincter tone: Good.  Palpable lesions: No.  Other: None.  Bimanual examination: was not performed.    Anoscopy: Was performed.   Hemorrhoids: Yes.  Grade 1-2 internal hemorrhoids with no evidence of thrombosis or bleeding.  Lesions: No    Investigations:  None.    Procedures:  None.    ASSESSMENT  61-year-old male with pruritus ani.  Posterior midline epithelialized anal fissure seen on exam.  Most likely cause of his pruritus.      PLAN  1.  High-fiber diet.  Start Citrucel 1 tablespoon daily and increase up to twice daily " as needed to have regular daily bowel movements.  2.  Diltiazem ointment.  Use this for at least 6 weeks.  3.  Calmoseptine 3-4 times per day for perianal skin irritation and itchiness.  4.  Sitz bath's, 3-4 times per day.  5.  Benadryl as needed for itchiness.    30 minutes spent on the date of the encounter doing chart review, history and exam, documentation and further activities as noted above.      Srinivas Leggett M.D., M.Sc.     Division of Colon and Rectal Surgery  [facility]    Referring Provider:  Referred Self, MD  No address on file     Primary Care Provider:  Sara Espitia

## 2022-02-07 NOTE — NURSING NOTE
"Chief Complaint   Patient presents with     New Patient     New consult for anal itching.       Vitals:    02/07/22 0907   BP: 139/78   BP Location: Left arm   Patient Position: Sitting   Cuff Size: Adult Regular   Pulse: 66   SpO2: 100%   Weight: 154 lb 6.4 oz   Height: 5' 7\"       Body mass index is 24.18 kg/m .          Eva Carcamo CMA    "

## 2022-04-19 DIAGNOSIS — L29.0 ANAL PRURITUS: ICD-10-CM

## 2022-09-04 ENCOUNTER — HEALTH MAINTENANCE LETTER (OUTPATIENT)
Age: 62
End: 2022-09-04

## 2023-01-07 ENCOUNTER — HOSPITAL ENCOUNTER (EMERGENCY)
Facility: CLINIC | Age: 63
Discharge: HOME | End: 2023-01-07
Attending: EMERGENCY MEDICINE | Admitting: EMERGENCY MEDICINE

## 2023-01-07 ENCOUNTER — APPOINTMENT (OUTPATIENT)
Dept: GENERAL RADIOLOGY | Facility: CLINIC | Age: 63
End: 2023-01-07
Attending: EMERGENCY MEDICINE

## 2023-01-07 VITALS
WEIGHT: 154 LBS | OXYGEN SATURATION: 97 % | TEMPERATURE: 98.4 F | RESPIRATION RATE: 18 BRPM | HEART RATE: 71 BPM | SYSTOLIC BLOOD PRESSURE: 115 MMHG | DIASTOLIC BLOOD PRESSURE: 60 MMHG | HEIGHT: 66 IN | BODY MASS INDEX: 24.75 KG/M2

## 2023-01-07 DIAGNOSIS — R05.1 ACUTE COUGH: ICD-10-CM

## 2023-01-07 LAB
DEPRECATED S PYO AG THROAT QL EIA: NEGATIVE
FLUAV RNA SPEC QL NAA+PROBE: NEGATIVE
FLUBV RNA RESP QL NAA+PROBE: NEGATIVE
GROUP A STREP BY PCR: NOT DETECTED
RSV RNA SPEC NAA+PROBE: NEGATIVE
SARS-COV-2 RNA RESP QL NAA+PROBE: NEGATIVE

## 2023-01-07 PROCEDURE — C9803 HOPD COVID-19 SPEC COLLECT: HCPCS | Performed by: EMERGENCY MEDICINE

## 2023-01-07 PROCEDURE — 99284 EMERGENCY DEPT VISIT MOD MDM: CPT | Mod: CS | Performed by: EMERGENCY MEDICINE

## 2023-01-07 PROCEDURE — 71046 X-RAY EXAM CHEST 2 VIEWS: CPT

## 2023-01-07 PROCEDURE — 87651 STREP A DNA AMP PROBE: CPT | Performed by: EMERGENCY MEDICINE

## 2023-01-07 PROCEDURE — 99284 EMERGENCY DEPT VISIT MOD MDM: CPT | Mod: CS,25 | Performed by: EMERGENCY MEDICINE

## 2023-01-07 PROCEDURE — 87637 SARSCOV2&INF A&B&RSV AMP PRB: CPT | Performed by: EMERGENCY MEDICINE

## 2023-01-07 RX ORDER — BENZONATATE 100 MG/1
100 CAPSULE ORAL 3 TIMES DAILY PRN
Qty: 30 CAPSULE | Refills: 0 | Status: SHIPPED | OUTPATIENT
Start: 2023-01-07

## 2023-01-07 ASSESSMENT — ACTIVITIES OF DAILY LIVING (ADL)
ADLS_ACUITY_SCORE: 33
ADLS_ACUITY_SCORE: 35

## 2023-01-07 NOTE — ED TRIAGE NOTES
"    Pt came back from [place] on Friday; last Saturday started feeling throat pain, \"like the flu\". Pt reports his wife was sick about a month ago.    Pt reports cough was tolerable initially but now he is coughing frequently and it is causing him to have a sore chest when he coughs; pt reports this is affecting his sleep at night. Pt denies fevers.  Pt has been using Delsym (Dextromethorphan) without relief.  "

## 2023-01-07 NOTE — ED PROVIDER NOTES
[facility] EMERGENCY DEPARTMENT ([facility])    1/07/23         History     Chief Complaint   Patient presents with     Cough     The history is provided by the patient and medical records.     Sowmya Villavicencio is a 62 year old male with history of prostate cancer s/p prostatectomy (12/14/2020) and GERD who presents to the Emergency Department with cough. Patient reports he was recently in [place] at [facility] for about 10 days. The day after he returned, last Saturday, 12/31, he began feeling ill with sore throat, feeling generally ill, rhinorrhea and cough.  Most symptoms have improved but since then he has developed a constant cough. He states cough has been worsening now, he was unable to sleep last night due to coughing. Cough is worse at night. Patient reports he has some chest discomfort due to the coughing as well as headache with increased coughing. He takes Tylenol with relief of headache. Patient reports he has been taking Delsym without relief. Patient denies difficulty breathing. He notes some voice change and sore throat due to coughing. Patient denies leg swelling or fever. No history of cardiac or pulmonary issues. Patient notes his wife had similar symptoms about 1 month ago but notes she tested positive for influenza. He has been drinking a lot of liquids and tea without improvement.     Past Medical History  Past Medical History:   Diagnosis Date     Gastroesophageal reflux disease      Prostate cancer (H)      Shingles     in the eye     Past Surgical History:   Procedure Laterality Date     DAVINCI PROSTATECTOMY Bilateral 12/14/2020    Procedure: ROBOTIC ASSISTED LAPAROSCOPIC RADICAL PROSTATECTOMY WITH BILATERAL PELVIC LYMPH NODE DISSECTION;  Surgeon: Dov Tellez MD;  Location:  OR     PROSTATE SURGERY       benzonatate (TESSALON) 100 MG capsule  carboxymethylcellulose (CARBOXYMETHYLCELLULOSE SODIUM) 0.5 % SOLN ophthalmic solution  cholecalciferol 25 MCG (1000 UT)  "TABS  COMPOUNDED NON-CONTROLLED SUBSTANCE (CMPD RX) - PHARMACY TO MIX COMPOUNDED MEDICATION  diltiazem 2% in PLO gel  diphenhydrAMINE (BENADRYL) 25 MG tablet  hydrocortisone, Perianal, (HYDROCORTISONE) 2.5 % cream  hydrocortisone, Perianal, (HYDROCORTISONE) 2.5 % cream  menthol-zinc oxide (CALMOSEPTINE) 0.44-20.6 % OINT ointment  methylcellulose (CITRUCEL) powder  multivitamin w/minerals (MULTI-VITAMIN) tablet  tadalafil (CIALIS) 5 MG tablet      Allergies   Allergen Reactions     Amoxicillin Diarrhea and GI Disturbance     Family History  Family History   Problem Relation Age of Onset     No Known Problems Mother      Social History   Social History     Tobacco Use     Smoking status: Never     Smokeless tobacco: Never   Substance Use Topics     Alcohol use: No     Drug use: No         A medically appropriate review of systems was performed with pertinent positives and negatives noted in the HPI, and all other systems negative.    Physical Exam   BP: 115/60  Pulse: 71  Temp: 98.4  F (36.9  C)  Resp: 18  Height: 167.6 cm (5' 6\")  Weight: 69.9 kg (154 lb)  SpO2: 97 %  Physical Exam  General: Awake alert no acute distress  HEENT: Posterior oropharynx is normal-appearing.  Uvula is midline.  No tonsillar swelling or posterior oropharynx abnormality.  Lungs: Clear to auscultation bilaterally without rales rhonchi or wheezes  Heart: Normal rate and rhythm without murmur  Extremities: No lower extremity edema      ED Course, Procedures, & Data   2:55 PM  The patient was seen and examined by Kevon Ahumada MD in Room ED19.      Procedures                   Results for orders placed or performed during the hospital encounter of 01/07/23   Chest XR,  PA & LAT     Status: None    Narrative    EXAM: XR CHEST 2 VIEWS  LOCATION: [facility]  DATE/TIME: 1/7/2023 4:16 PM    INDICATION: cough  COMPARISON: PA and lateral views of the chest 7/26/2013      Impression    IMPRESSION: Negative " chest.   Symptomatic Influenza A/B & SARS-CoV2 (COVID-19) Virus PCR Multiplex Nasopharyngeal     Status: Normal    Specimen: Nasopharyngeal; Swab   Result Value Ref Range    Influenza A PCR Negative Negative    Influenza B PCR Negative Negative    RSV PCR Negative Negative    SARS CoV2 PCR Negative Negative    Narrative    Testing was performed using the Xpert Xpress CoV2/Flu/RSV Assay on the [employer] GeneXpert Instrument. This test should be ordered for the detection of SARS-CoV-2 and influenza viruses in individuals who meet clinical and/or epidemiological criteria. Test performance is unknown in asymptomatic patients. This test is for in vitro diagnostic use under the FDA EUA for laboratories certified under CLIA to perform high or moderate complexity testing. This test has not been FDA cleared or approved. A negative result does not rule out the presence of PCR inhibitors in the specimen or target RNA in concentration below the limit of detection for the assay. If only one viral target is positive but coinfection with multiple targets is suspected, the sample should be re-tested with another FDA cleared, approved, or authorized test, if coinfection would change clinical management. This test was validated by the [facility]. These laboratories are certified under the Clinical Laboratory Improvement Amendments of 1988 (CLIA-88) as qualified to perform high complexity laboratory testing.   Streptococcus A Rapid Scr w Reflx to PCR     Status: Normal    Specimen: Throat; Swab   Result Value Ref Range    Group A Strep antigen Negative Negative     Medications - No data to display  Labs Ordered and Resulted from Time of ED Arrival to Time of ED Departure   INFLUENZA A/B & SARS-COV2 PCR MULTIPLEX - Normal       Result Value    Influenza A PCR Negative      Influenza B PCR Negative      RSV PCR Negative      SARS CoV2 PCR Negative     STREPTOCOCCUS A RAPID SCREEN W REFELX TO PCR - Normal    Group A  Strep antigen Negative     GROUP A STREPTOCOCCUS PCR THROAT SWAB     Chest XR,  PA & LAT   Final Result   IMPRESSION: Negative chest.             Medical Decision Making  The patient presented with a problem that is an acute illness with systemic symptoms.    The patient's evaluation involved:  review of 3+ test result(s) ordered prior to this encounter (see separate area of note for details)    The patient's management involved prescription drug management.      Assessment & Plan    Patient is a 62 year old male who presents to the emergency department with persistent cough over the past week in the setting of recent viral syndrome, now improved.     Patient is well-appearing, nontoxic.  Normal vital signs.  Normal heart and lung exam.    Considered some type of cardiac etiology given the nighttime symptoms but without shortness of breath, chest pain, lower extremity edema or pulmonary edema noted on chest x-ray I think that is unlikely.  Especially in the setting of likely viral syndrome.    Patient had a negative COVID, influenza, and strep test here in the ED.    Chest x-ray showed showed no infiltrates.  No pulmonary edema.    He will be prescribed tessalon Perles for improvement in symptoms.  Discussed he should return to the ER should he develop shortness of breath, fevers, new or worsening symptoms.    I have reviewed the nursing notes. I have reviewed the findings, diagnosis, plan and need for follow up with the patient.    Discharge Medication List as of 1/7/2023  5:06 PM      START taking these medications    Details   benzonatate (TESSALON) 100 MG capsule Take 1 capsule (100 mg) by mouth 3 times daily as needed for cough, Disp-30 capsule, R-0, DELVIS, Local Print             Final diagnoses:   Acute cough     IStella, am serving as a trained medical scribe to document services personally performed by Kevon Corrales MD, based on the provider's statements to me.      I, Kevon Corrales MD, was physically  present and have reviewed and verified the accuracy of this note documented by Stella Baig.    Kevon Corrales MD  [facility] EMERGENCY DEPARTMENT  1/7/2023     Kevon Corrales MD  01/07/23 9230

## 2023-01-21 ENCOUNTER — HEALTH MAINTENANCE LETTER (OUTPATIENT)
Age: 63
End: 2023-01-21

## 2023-10-01 ENCOUNTER — HEALTH MAINTENANCE LETTER (OUTPATIENT)
Age: 63
End: 2023-10-01

## 2023-11-09 DIAGNOSIS — Z12.11 COLON CANCER SCREENING: ICD-10-CM

## 2023-11-23 ENCOUNTER — LAB (OUTPATIENT)
Dept: FAMILY MEDICINE | Facility: CLINIC | Age: 63
End: 2023-11-23

## 2023-11-23 DIAGNOSIS — Z12.11 COLON CANCER SCREENING: ICD-10-CM

## 2024-05-21 NOTE — TELEPHONE ENCOUNTER
Called patient and he was already informed of results and is aware to have a bone scan done. Appointment has been made for this.     Renee Moon LPN     Her/She

## 2024-10-19 NOTE — TELEPHONE ENCOUNTER
"Patient needs referral for ENT. He has appointment on Thursday in [place].     PCP: Please approve the referral or ENT. Patient  has appointment  Dr. Tabares.       Reason for Disposition    Ear pain is main symptom    Moving the earlobe or touching the ear clearly increases the pain    Diagnosis is uncertain    Additional Information    Negative: Recently examined and diagnosed with \"Otitis Externa\" or \"Swimmer's Ear\"    Negative: [1] Stiff neck (unable to touch chin to chest) AND [2] fever    Negative: [1] Stiff neck (unable to touch chin to chest) AND [2] headache    Negative: Bony area of skull behind the ear is pink or swollen    Negative: Patient sounds very sick or weak to the triager    Negative: [1] SEVERE pain and [2] not improved 2 hours after analgesic medication (e.g., ibuprofen or acetaminophen)    Negative: Fever > 100.4 F (38.0 C)    Negative: Outer ear (ear lobe) is red and swollen    Negative: [1] Stiff neck (unable to touch chin to chest) AND [2] no fever or headache    Negative: Diabetes mellitus or weak immune system (e.g., HIV positive, cancer chemo, splenectomy, organ transplant, chronic steroids)    Negative: Yellow or green discharge from ear canal    Negative: Decreased hearing (or another adult says that the ear canal is completely blocked with discharge)    Negative: Redness of outer ear    Negative: Swollen lymph node near ear    Negative: [1] After 3 days of treatment AND [2] ear symptoms persist    Negative: Mild swimmer's ear    Negative: ALSO, cloudy-white discharge    Negative: Prevention of swimmer's ear (otitis externa), questions about    Protocols used: EAR - CONGESTION-A-AH, EARACHE-A-AH, EAR - SWIMMER'S-A-AH      Stacey Rodriguez RN  [facility]       " yes

## 2024-11-24 ENCOUNTER — HEALTH MAINTENANCE LETTER (OUTPATIENT)
Age: 64
End: 2024-11-24

## (undated) DEVICE — LINEN TOWEL PACK X5 5464

## (undated) DEVICE — SU WND CLOSURE V-LOC 3-0 CV-23 6" VLOCM1904

## (undated) DEVICE — SOL WATER IRRIG 1000ML BOTTLE 2F7114

## (undated) DEVICE — DAVINCI XI DRAPE COLUMN 470341

## (undated) DEVICE — DAVINCI XI DRAPE ARM 470015

## (undated) DEVICE — SPONGE RAY-TEC 4X8" 7318

## (undated) DEVICE — DRAIN JACKSON PRATT RESERVOIR 100ML SU130-1305

## (undated) DEVICE — TROCAR AIRSEAL BLADELESS 12X120MM IAS12-120

## (undated) DEVICE — GLOVE PROTEXIS W/NEU-THERA 7.5  2D73TE75

## (undated) DEVICE — NDL INSUFFLATION 13GA 120MM C2201

## (undated) DEVICE — ENDO POUCH UNIV RETRIEVAL SYSTEM INZII 10MM CD001

## (undated) DEVICE — CATH FOLEY 18FR 5ML LATEX 0165SI18

## (undated) DEVICE — SU SILK 2-0 FSL 18" 677G

## (undated) DEVICE — DAVINCI XI GRASPER ENDOWRIST PROGRASP 470093

## (undated) DEVICE — SU PDS II 0 CT-2 27" Z334H

## (undated) DEVICE — SUCTION IRR STRYKERFLOW II W/TIP 250-070-520

## (undated) DEVICE — DAVINCI XI SEAL UNIVERSAL 5-8MM 470361

## (undated) DEVICE — SU VICRYL 0 TIE 12X18" J906G

## (undated) DEVICE — DAVINCI HOT SHEARS TIP COVER  400180

## (undated) DEVICE — TAPE CLOTH ADHESIVE 3" LATEX FREE

## (undated) DEVICE — DRAIN JACKSON PRATT CHANNEL 19FR ROUND HUBLESS SIL JP-2230

## (undated) DEVICE — GOWN IMPERVIOUS SPECIALTY XL/XLONG 39049

## (undated) DEVICE — KIT PATIENT POSITIONING PIGAZZI LATEX FREE 40580

## (undated) DEVICE — DAVINCI XI NDL DRIVER LARGE 470006

## (undated) DEVICE — JELLY LUBRICATING SURGILUBE 4OZ TUBE

## (undated) DEVICE — CATH FOLEY COUNCIL 18FR 5ML LATEX 0196SI18

## (undated) DEVICE — SU VICRYL 0 CT-2 27" J334H

## (undated) DEVICE — GLOVE PROTEXIS BLUE W/NEU-THERA 6.5  2D73EB65

## (undated) DEVICE — CLIP ENDO HEMO-LOC PURPLE LG 544240

## (undated) DEVICE — SU MONOCRYL 0 CT-1 27" Y340H

## (undated) DEVICE — PACK DAVINCI UROLOGY SBA15UDFSG

## (undated) DEVICE — SU MONOCRYL 4-0 PS-2 18" UND Y496G

## (undated) DEVICE — TUBING CONMED AIRSEAL SMOKE EVAC INSUFFLATION ASM-EVAC

## (undated) DEVICE — DAVINCI XI MONOPOLAR SCISSORS HOT SHEARS 8MM 470179

## (undated) DEVICE — PROTECTOR ARM ONE-STEP TRENDELENBURG 40418

## (undated) DEVICE — SU WND CLOSURE VLOC 90 ABS 3-0 VIOLET 6" CV-23 VLOCM0804

## (undated) RX ORDER — HYDROMORPHONE HYDROCHLORIDE 1 MG/ML
INJECTION, SOLUTION INTRAMUSCULAR; INTRAVENOUS; SUBCUTANEOUS
Status: DISPENSED
Start: 2020-12-14

## (undated) RX ORDER — CEFAZOLIN SODIUM 2 G/100ML
INJECTION, SOLUTION INTRAVENOUS
Status: DISPENSED
Start: 2020-12-14

## (undated) RX ORDER — DEXAMETHASONE SODIUM PHOSPHATE 4 MG/ML
INJECTION, SOLUTION INTRA-ARTICULAR; INTRALESIONAL; INTRAMUSCULAR; INTRAVENOUS; SOFT TISSUE
Status: DISPENSED
Start: 2020-12-14

## (undated) RX ORDER — FENTANYL CITRATE 50 UG/ML
INJECTION, SOLUTION INTRAMUSCULAR; INTRAVENOUS
Status: DISPENSED
Start: 2020-12-14

## (undated) RX ORDER — HEPARIN SODIUM 5000 [USP'U]/.5ML
INJECTION, SOLUTION INTRAVENOUS; SUBCUTANEOUS
Status: DISPENSED
Start: 2020-12-14

## (undated) RX ORDER — ONDANSETRON 2 MG/ML
INJECTION INTRAMUSCULAR; INTRAVENOUS
Status: DISPENSED
Start: 2020-12-14

## (undated) RX ORDER — LIDOCAINE HYDROCHLORIDE 20 MG/ML
INJECTION, SOLUTION EPIDURAL; INFILTRATION; INTRACAUDAL; PERINEURAL
Status: DISPENSED
Start: 2020-12-14

## (undated) RX ORDER — PROPOFOL 10 MG/ML
INJECTION, EMULSION INTRAVENOUS
Status: DISPENSED
Start: 2020-12-14

## (undated) RX ORDER — CEFAZOLIN SODIUM 1 G/3ML
INJECTION, POWDER, FOR SOLUTION INTRAMUSCULAR; INTRAVENOUS
Status: DISPENSED
Start: 2020-12-14